# Patient Record
Sex: FEMALE | Race: WHITE | NOT HISPANIC OR LATINO | Employment: STUDENT | URBAN - METROPOLITAN AREA
[De-identification: names, ages, dates, MRNs, and addresses within clinical notes are randomized per-mention and may not be internally consistent; named-entity substitution may affect disease eponyms.]

---

## 2020-07-13 ENCOUNTER — OFFICE VISIT (OUTPATIENT)
Dept: INTERNAL MEDICINE | Facility: CLINIC | Age: 20
End: 2020-07-13

## 2020-07-13 ENCOUNTER — LAB (OUTPATIENT)
Dept: LAB | Facility: HOSPITAL | Age: 20
End: 2020-07-13

## 2020-07-13 VITALS
RESPIRATION RATE: 16 BRPM | HEIGHT: 68 IN | SYSTOLIC BLOOD PRESSURE: 140 MMHG | BODY MASS INDEX: 42.41 KG/M2 | HEART RATE: 86 BPM | WEIGHT: 279.8 LBS | TEMPERATURE: 97.7 F | OXYGEN SATURATION: 97 % | DIASTOLIC BLOOD PRESSURE: 80 MMHG

## 2020-07-13 DIAGNOSIS — F41.9 ANXIETY AND DEPRESSION: ICD-10-CM

## 2020-07-13 DIAGNOSIS — Z11.59 ENCOUNTER FOR HEPATITIS C SCREENING TEST FOR LOW RISK PATIENT: ICD-10-CM

## 2020-07-13 DIAGNOSIS — F32.A ANXIETY AND DEPRESSION: ICD-10-CM

## 2020-07-13 DIAGNOSIS — J45.30 MILD PERSISTENT ASTHMA WITHOUT COMPLICATION: ICD-10-CM

## 2020-07-13 DIAGNOSIS — Z01.419 WELL WOMAN EXAM WITH ROUTINE GYNECOLOGICAL EXAM: ICD-10-CM

## 2020-07-13 DIAGNOSIS — Z13.220 SCREENING, LIPID: ICD-10-CM

## 2020-07-13 DIAGNOSIS — Z13.1 SCREENING FOR DIABETES MELLITUS: ICD-10-CM

## 2020-07-13 DIAGNOSIS — M17.10 ARTHRITIS OF KNEE: ICD-10-CM

## 2020-07-13 DIAGNOSIS — Z00.00 HEALTHCARE MAINTENANCE: ICD-10-CM

## 2020-07-13 DIAGNOSIS — Z30.41 ENCOUNTER FOR SURVEILLANCE OF CONTRACEPTIVE PILLS: ICD-10-CM

## 2020-07-13 DIAGNOSIS — G43.709 CHRONIC MIGRAINE WITHOUT AURA WITHOUT STATUS MIGRAINOSUS, NOT INTRACTABLE: ICD-10-CM

## 2020-07-13 DIAGNOSIS — J30.81 ALLERGIC RHINITIS DUE TO ANIMAL HAIR AND DANDER: Primary | ICD-10-CM

## 2020-07-13 PROCEDURE — 83036 HEMOGLOBIN GLYCOSYLATED A1C: CPT | Performed by: INTERNAL MEDICINE

## 2020-07-13 PROCEDURE — 80061 LIPID PANEL: CPT | Performed by: INTERNAL MEDICINE

## 2020-07-13 PROCEDURE — 85027 COMPLETE CBC AUTOMATED: CPT | Performed by: INTERNAL MEDICINE

## 2020-07-13 PROCEDURE — 80053 COMPREHEN METABOLIC PANEL: CPT | Performed by: INTERNAL MEDICINE

## 2020-07-13 PROCEDURE — 86803 HEPATITIS C AB TEST: CPT | Performed by: INTERNAL MEDICINE

## 2020-07-13 PROCEDURE — 99204 OFFICE O/P NEW MOD 45 MIN: CPT | Performed by: INTERNAL MEDICINE

## 2020-07-13 RX ORDER — CETIRIZINE HYDROCHLORIDE 10 MG/1
10 TABLET ORAL DAILY
COMMUNITY
End: 2020-12-30

## 2020-07-13 RX ORDER — SUMATRIPTAN 50 MG/1
TABLET, FILM COATED ORAL
Qty: 12 TABLET | Refills: 2 | Status: SHIPPED | OUTPATIENT
Start: 2020-07-13 | End: 2022-07-21 | Stop reason: SDUPTHER

## 2020-07-13 RX ORDER — DESOGESTREL AND ETHINYL ESTRADIOL 21-5 (28)
1 KIT ORAL DAILY
COMMUNITY
End: 2020-12-30

## 2020-07-13 RX ORDER — VENLAFAXINE 100 MG/1
150 TABLET ORAL 2 TIMES DAILY
COMMUNITY
End: 2020-07-13

## 2020-07-13 RX ORDER — NAPROXEN 500 MG/1
500 TABLET ORAL 2 TIMES DAILY WITH MEALS
COMMUNITY
End: 2020-07-13 | Stop reason: SDUPTHER

## 2020-07-13 RX ORDER — BUPROPION HYDROCHLORIDE 450 MG/1
450 TABLET, FILM COATED, EXTENDED RELEASE ORAL DAILY
COMMUNITY
End: 2021-09-14 | Stop reason: DRUGHIGH

## 2020-07-13 RX ORDER — ALBUTEROL SULFATE 1.25 MG/3ML
1 SOLUTION RESPIRATORY (INHALATION) EVERY 6 HOURS PRN
COMMUNITY

## 2020-07-13 RX ORDER — SUMATRIPTAN 50 MG/1
50 TABLET, FILM COATED ORAL
COMMUNITY
End: 2020-07-13 | Stop reason: SDUPTHER

## 2020-07-13 RX ORDER — NAPROXEN 500 MG/1
500 TABLET ORAL 2 TIMES DAILY WITH MEALS
Qty: 30 TABLET | Refills: 5 | Status: SHIPPED | OUTPATIENT
Start: 2020-07-13 | End: 2022-07-21 | Stop reason: SDUPTHER

## 2020-07-13 RX ORDER — VENLAFAXINE HYDROCHLORIDE 150 MG/1
150 CAPSULE, EXTENDED RELEASE ORAL DAILY
COMMUNITY
End: 2021-07-19 | Stop reason: SDUPTHER

## 2020-07-13 RX ORDER — PROPRANOLOL HYDROCHLORIDE 10 MG/1
10 TABLET ORAL 2 TIMES DAILY PRN
COMMUNITY
End: 2021-09-14 | Stop reason: SDUPTHER

## 2020-07-13 NOTE — PROGRESS NOTES
Internal Medicine New Patient  Deysi Ordoñez is a 20 y.o. female who presents today to establish care and with concerns as outlined below.    Chief Complaint  Chief Complaint   Patient presents with   • Establish Care        HPI  Ms. Ordoñez comes today to establish care. She has moved here from [place] to go to [employer] for Graduate school in Chemistry.    She has anxiety and depression. She takes wellbutrin 450mg daily and effexor 150mg daily. She also has propranolol 10mg BID PRN for anxiety which she states is not taken very often but when taken is effective. This has been a stable regimen for the last 5 years. Mood is stable.    She has allergies, asthma, chronic sinusitis. Reports rhinoplasty x2 due to monthly sinus infections. She takes zyrtec and flonase PRN. Has been on immunotherapy in the past which she felt did not help. She uses advair daily and has needed albuterol nebulizer before bed each night lately due to moving into a new apartment. No SOA or cough. Needs allergy referral.    She has bilateral knee arthritis with floating patella. The pain is not bothersome, was previously when she played soccer as a teenager.    She has migraines and takes naproxen and imitrex 50mg PRN. She gets headache about once a month. She feels they are connected to OCP because they occur typically when resuming hormone containing pills at the beginning of a pack or if she misses a dose and resumes the following day. No aura preceding migraines. She is on her current birth control for acne. She has never been on any type of OCP or birth control.       Review of Systems  Review of Systems   Constitutional: Negative.    Eyes: Negative.    Respiratory: Negative.    Cardiovascular: Negative.    Gastrointestinal: Negative.    Genitourinary: Negative.    Musculoskeletal: Positive for arthralgias (knees).   Skin: Negative.    Allergic/Immunologic: Positive for environmental allergies.   Psychiatric/Behavioral: Positive for depressed  mood (infrequently). Negative for self-injury, suicidal ideas and stress. The patient is nervous/anxious (infrequently).         Past Medical History  Past Medical History:   Diagnosis Date   • Anxiety    • Depression         Surgical History  Past Surgical History:   Procedure Laterality Date   • NASAL SEPTUM SURGERY      1st 4/2009,2nd 5/2018        Family History  Family History   Problem Relation Age of Onset   • Thyroid disease Mother    • Hypertension Maternal Grandmother    • Hypertension Maternal Grandfather         Social History  Social History     Socioeconomic History   • Marital status: Single     Spouse name: Not on file   • Number of children: Not on file   • Years of education: Not on file   • Highest education level: Not on file   Tobacco Use   • Smoking status: Never Smoker   • Smokeless tobacco: Never Used   Substance and Sexual Activity   • Alcohol use: Yes     Comment: once a week   • Drug use: Never   • Sexual activity: Not Currently        Current Medications  Current Outpatient Medications on File Prior to Visit   Medication Sig Dispense Refill   • buPROPion XL (FORFIVO XL) 450 MG 24 hr tablet Take 450 mg by mouth Daily.     • desogestrel-ethinyl estradiol (Viorele) 0.15-0.02/0.01 MG (21/5) per tablet Take 1 tablet by mouth Daily.     • fluticasone-salmeterol (Advair Diskus) 250-50 MCG/DOSE DISKUS Inhale 2 (Two) Times a Day.     • naproxen (NAPROSYN) 500 MG tablet Take 500 mg by mouth 2 (Two) Times a Day With Meals.     • propranolol (INDERAL) 10 MG tablet Take 10 mg by mouth 2 (Two) Times a Day.     • SUMAtriptan (IMITREX) 50 MG tablet Take 50 mg by mouth Every 2 (Two) Hours As Needed for Migraine. Take one tablet at onset of headache. May repeat dose one time in 2 hours if headache not relieved.     • venlafaxine (EFFEXOR) 100 MG tablet Take 150 mg by mouth 2 (Two) Times a Day.       No current facility-administered medications on file prior to visit.        Allergies  No Known Allergies  "    Objective  Visit Vitals  /80   Pulse 86   Temp 97.7 °F (36.5 °C)   Resp 16   Ht 172.7 cm (68\")   Wt 127 kg (279 lb 12.8 oz)   SpO2 97%   BMI 42.54 kg/m²        Physical Exam  Physical Exam   Constitutional: She is oriented to person, place, and time. She appears well-developed and well-nourished. No distress. She is obese.  HENT:   Head: Normocephalic and atraumatic.   Right Ear: External ear normal.   Left Ear: External ear normal.   Nose: Nose normal.   Mouth/Throat: Oropharynx is clear and moist. No oropharyngeal exudate.   Eyes: Pupils are equal, round, and reactive to light. Conjunctivae and EOM are normal. No scleral icterus.   Neck: Neck supple.   Cardiovascular: Normal rate, regular rhythm and normal heart sounds.   No murmur heard.  Pulmonary/Chest: Effort normal and breath sounds normal. No respiratory distress.   Abdominal: Soft. Bowel sounds are normal. She exhibits no distension. There is no tenderness.   Musculoskeletal: She exhibits no edema or deformity.   Lymphadenopathy:     She has no cervical adenopathy.   Neurological: She is alert and oriented to person, place, and time.   Skin: Skin is warm and dry. No rash noted. She is not diaphoretic.   Psychiatric: She has a normal mood and affect. Her behavior is normal. Judgment and thought content normal.   Nursing note and vitals reviewed.       Results  No results found for this or any previous visit.     Assessment and Plan  Deysi was seen today for establish care.    Diagnoses and all orders for this visit:    Allergic rhinitis due to animal hair and dander  - On zyrtec and PRN flonase. Has had surgery to correct nasal septum deviation, remove polyps twice, adenoidectomy, turbinate reduction, sinus reconstruction.  - Has been on immunotherapy in the past with no improvement per her report.  - Referring to allergist today at her request.    Mild persistent asthma without complication  - On advair with good control of asthma typically " however has used albuterol nightly since moving into new apartment. No wheezing on exam. Respiratory effort normal, not hypoxic.  - Will continue current regimen and monitor for improvement.    Chronic migraine without aura without status migrainosus, not intractable  - Treats monthly headache with naproxen and imitrex 50mg PRN. May be related to OCP as she notes. Addressed below.    Anxiety and depression  - Well controlled on wellbutrin 450mg daily and effexor 150mg daily, propranolol 10mg BID PRN.    Arthritis of knee  - Reports bilateral knee arthritis and floating patella diagnosed by orthopedics. Pain currently manageable.    Encounter for surveillance of contraceptive pills  - Currently on OCP which is potentially triggering migraine headaches monthly. She will discuss alternative contraception with GYN, referral placed today.    Well woman exam with routine gynecological exam  - Pap smear 2019, possibly abnormal but not requiring additional testing per patient. ASCUS? Will need records which have been requested. Referring her to GYN.    Encounter for hepatitis C screening test for low risk patient  - HCV ab ordered.    Healthcare maintenance  - CBC and CMP ordered    Screening, lipid  - Lipid panel ordered.    Screening for diabetes mellitus  - A1c ordered    Health Maintenance   Topic Date Due   • ANNUAL PHYSICAL  04/21/2001   • HPV VACCINES (1 - Female 2-dose series) 04/21/2009   • TDAP/TD VACCINES (1 - Tdap) 04/21/2009   • HEPATITIS C SCREENING  07/13/2020   • CHLAMYDIA SCREENING  07/13/2020   • PAP SMEAR  07/13/2020   • INFLUENZA VACCINE  08/01/2020   • MENINGOCOCCAL VACCINE (Normal Risk)  Aged Out     Health Maintenance  - Pap smear: records requested from prior GYN.  - Mammogram: Start screening at age 40.  - Colonoscopy: Start screening at age 45-50.  - HCV: ordered  - Immunizations: records requested  - Depression screening: PHQ2=1 7/2020    Return in about 6 months (around 1/13/2021) for Follow up,  1 year for annual, Labs today.    Addendum:  Pap smear 12/16/19 ASCUS, HPV negative.

## 2020-07-14 LAB
ALBUMIN SERPL-MCNC: 4.5 G/DL (ref 3.5–5.2)
ALBUMIN/GLOB SERPL: 1.3 G/DL
ALP SERPL-CCNC: 56 U/L (ref 39–117)
ALT SERPL W P-5'-P-CCNC: 13 U/L (ref 1–33)
ANION GAP SERPL CALCULATED.3IONS-SCNC: 11.9 MMOL/L (ref 5–15)
AST SERPL-CCNC: 22 U/L (ref 1–32)
BILIRUB SERPL-MCNC: 0.4 MG/DL (ref 0–1.2)
BUN SERPL-MCNC: 13 MG/DL (ref 6–20)
BUN/CREAT SERPL: 14.8 (ref 7–25)
CALCIUM SPEC-SCNC: 10 MG/DL (ref 8.6–10.5)
CHLORIDE SERPL-SCNC: 105 MMOL/L (ref 98–107)
CHOLEST SERPL-MCNC: 200 MG/DL (ref 0–200)
CO2 SERPL-SCNC: 25.1 MMOL/L (ref 22–29)
CREAT SERPL-MCNC: 0.88 MG/DL (ref 0.57–1)
DEPRECATED RDW RBC AUTO: 37.3 FL (ref 37–54)
ERYTHROCYTE [DISTWIDTH] IN BLOOD BY AUTOMATED COUNT: 11.9 % (ref 12.3–15.4)
GFR SERPL CREATININE-BSD FRML MDRD: 80 ML/MIN/1.73
GLOBULIN UR ELPH-MCNC: 3.6 GM/DL
GLUCOSE SERPL-MCNC: 85 MG/DL (ref 65–99)
HBA1C MFR BLD: 5.15 % (ref 4.8–5.6)
HCT VFR BLD AUTO: 41.8 % (ref 34–46.6)
HCV AB SER DONR QL: NORMAL
HDLC SERPL-MCNC: 59 MG/DL (ref 40–60)
HGB BLD-MCNC: 14.5 G/DL (ref 12–15.9)
LDLC SERPL CALC-MCNC: 119 MG/DL (ref 0–100)
LDLC/HDLC SERPL: 2.02 {RATIO}
MCH RBC QN AUTO: 29.6 PG (ref 26.6–33)
MCHC RBC AUTO-ENTMCNC: 34.7 G/DL (ref 31.5–35.7)
MCV RBC AUTO: 85.3 FL (ref 79–97)
PLATELET # BLD AUTO: 376 10*3/MM3 (ref 140–450)
PMV BLD AUTO: 9.7 FL (ref 6–12)
POTASSIUM SERPL-SCNC: 4.7 MMOL/L (ref 3.5–5.2)
PROT SERPL-MCNC: 8.1 G/DL (ref 6–8.5)
RBC # BLD AUTO: 4.9 10*6/MM3 (ref 3.77–5.28)
SODIUM SERPL-SCNC: 142 MMOL/L (ref 136–145)
TRIGL SERPL-MCNC: 109 MG/DL (ref 0–150)
VLDLC SERPL-MCNC: 21.8 MG/DL (ref 5–40)
WBC # BLD AUTO: 6.07 10*3/MM3 (ref 3.4–10.8)

## 2020-12-30 ENCOUNTER — OFFICE VISIT (OUTPATIENT)
Dept: OBSTETRICS AND GYNECOLOGY | Facility: CLINIC | Age: 20
End: 2020-12-30

## 2020-12-30 VITALS
SYSTOLIC BLOOD PRESSURE: 122 MMHG | DIASTOLIC BLOOD PRESSURE: 78 MMHG | BODY MASS INDEX: 40.77 KG/M2 | WEIGHT: 269 LBS | HEIGHT: 68 IN

## 2020-12-30 DIAGNOSIS — Z01.419 ENCNTR FOR GYN EXAM (GENERAL) (ROUTINE) W/O ABN FINDINGS: Primary | ICD-10-CM

## 2020-12-30 PROCEDURE — 99385 PREV VISIT NEW AGE 18-39: CPT | Performed by: OBSTETRICS & GYNECOLOGY

## 2020-12-30 RX ORDER — MONTELUKAST SODIUM 10 MG/1
TABLET ORAL
COMMUNITY
Start: 2020-08-14

## 2021-01-07 ENCOUNTER — OFFICE VISIT (OUTPATIENT)
Dept: INTERNAL MEDICINE | Facility: CLINIC | Age: 21
End: 2021-01-07

## 2021-01-07 ENCOUNTER — LAB (OUTPATIENT)
Dept: LAB | Facility: HOSPITAL | Age: 21
End: 2021-01-07

## 2021-01-07 VITALS
HEIGHT: 68 IN | SYSTOLIC BLOOD PRESSURE: 120 MMHG | RESPIRATION RATE: 16 BRPM | BODY MASS INDEX: 40.86 KG/M2 | OXYGEN SATURATION: 99 % | DIASTOLIC BLOOD PRESSURE: 74 MMHG | TEMPERATURE: 97.7 F | WEIGHT: 269.6 LBS | HEART RATE: 87 BPM

## 2021-01-07 DIAGNOSIS — R10.11 RUQ ABDOMINAL PAIN: ICD-10-CM

## 2021-01-07 DIAGNOSIS — R10.11 RUQ ABDOMINAL PAIN: Primary | ICD-10-CM

## 2021-01-07 LAB
ALBUMIN SERPL-MCNC: 4.3 G/DL (ref 3.5–5.2)
ALBUMIN/GLOB SERPL: 1.3 G/DL
ALP SERPL-CCNC: 67 U/L (ref 39–117)
ALT SERPL W P-5'-P-CCNC: 10 U/L (ref 1–33)
ANION GAP SERPL CALCULATED.3IONS-SCNC: 7.7 MMOL/L (ref 5–15)
AST SERPL-CCNC: 21 U/L (ref 1–32)
BILIRUB SERPL-MCNC: 0.3 MG/DL (ref 0–1.2)
BUN SERPL-MCNC: 11 MG/DL (ref 6–20)
BUN/CREAT SERPL: 12.6 (ref 7–25)
CALCIUM SPEC-SCNC: 10.1 MG/DL (ref 8.6–10.5)
CHLORIDE SERPL-SCNC: 105 MMOL/L (ref 98–107)
CO2 SERPL-SCNC: 28.3 MMOL/L (ref 22–29)
CREAT SERPL-MCNC: 0.87 MG/DL (ref 0.57–1)
DEPRECATED RDW RBC AUTO: 38.4 FL (ref 37–54)
ERYTHROCYTE [DISTWIDTH] IN BLOOD BY AUTOMATED COUNT: 11.8 % (ref 12.3–15.4)
GFR SERPL CREATININE-BSD FRML MDRD: 81 ML/MIN/1.73
GLOBULIN UR ELPH-MCNC: 3.2 GM/DL
GLUCOSE SERPL-MCNC: 91 MG/DL (ref 65–99)
HCT VFR BLD AUTO: 41.4 % (ref 34–46.6)
HGB BLD-MCNC: 13.8 G/DL (ref 12–15.9)
MCH RBC QN AUTO: 29.6 PG (ref 26.6–33)
MCHC RBC AUTO-ENTMCNC: 33.3 G/DL (ref 31.5–35.7)
MCV RBC AUTO: 88.7 FL (ref 79–97)
PLATELET # BLD AUTO: 309 10*3/MM3 (ref 140–450)
PMV BLD AUTO: 9.9 FL (ref 6–12)
POTASSIUM SERPL-SCNC: 4 MMOL/L (ref 3.5–5.2)
PROT SERPL-MCNC: 7.5 G/DL (ref 6–8.5)
RBC # BLD AUTO: 4.67 10*6/MM3 (ref 3.77–5.28)
SODIUM SERPL-SCNC: 141 MMOL/L (ref 136–145)
WBC # BLD AUTO: 5.1 10*3/MM3 (ref 3.4–10.8)

## 2021-01-07 PROCEDURE — 85027 COMPLETE CBC AUTOMATED: CPT

## 2021-01-07 PROCEDURE — 99213 OFFICE O/P EST LOW 20 MIN: CPT | Performed by: INTERNAL MEDICINE

## 2021-01-07 PROCEDURE — 80053 COMPREHEN METABOLIC PANEL: CPT

## 2021-01-07 NOTE — PROGRESS NOTES
Internal Medicine Acute Visit    Chief Complaint   Patient presents with   • Abdominal Pain     x2 weeks        HPI  Ms. Ordoñez comes in today for 2 weeks of intermittent sharp, shooting epigastric to RUQ abdominal pain that radiates to her back. It occurs mainly after eating fatty, greasy foods and lasts for 30 minutes to a few hours. She denies GERD or reflux symptoms. She has not had N/V. She has had diarrhea daily which she describes as two softer stools twice daily. It is yellow in color, nonbloody. She has not had any abdominal surgeries and still has her gallbladder. No fever. She has naproxen on her med list which she uses only about 1-2 times a week at most for migraine.    Abdominal Pain  This is a new problem. The current episode started 1 to 4 weeks ago. The onset quality is sudden. The problem occurs daily. The most recent episode lasted 4 hours. The problem has been unchanged. The pain is located in the epigastric region. The pain is at a severity of 6/10. The quality of the pain is sharp. The abdominal pain radiates to the back. Associated symptoms include belching, constipation and diarrhea. Pertinent negatives include no anorexia, arthralgias, dysuria, fever, flatus, frequency, headaches, hematochezia, hematuria, melena, myalgias, nausea, vomiting or weight loss. The pain is aggravated by certain positions and eating. The pain is relieved by nothing and belching.        Review of Systems  Review of Systems   Constitutional: Negative for chills, fever and unexpected weight loss.   Gastrointestinal: Positive for abdominal pain, constipation and diarrhea. Negative for anorexia, blood in stool, flatus, hematochezia, melena, nausea, vomiting and GERD.   Genitourinary: Negative for dysuria, frequency and hematuria.   Musculoskeletal: Negative for arthralgias and myalgias.   Skin: Negative for color change.        Medications  Current Outpatient Medications on File Prior to Visit   Medication Sig  "Dispense Refill   • albuterol (ACCUNEB) 1.25 MG/3ML nebulizer solution Take 1 ampule by nebulization Every 6 (Six) Hours As Needed.     • buPROPion XL (FORFIVO XL) 450 MG 24 hr tablet Take 450 mg by mouth Daily.     • fluticasone (VERAMYST) 27.5 MCG/SPRAY nasal spray 2 sprays into the nostril(s) as directed by provider Daily.     • Fluticasone Furoate-Vilanterol (Breo Ellipta) 100-25 MCG/INH inhaler      • montelukast (SINGULAIR) 10 MG tablet      • naproxen (NAPROSYN) 500 MG tablet Take 1 tablet by mouth 2 (Two) Times a Day With Meals. 30 tablet 5   • Norethin-Eth Estrad-Fe Biphas (LO LOESTRIN FE) 1 MG-10 MCG / 10 MCG tablet Take 1 tablet by mouth Daily. 84 tablet 4   • propranolol (INDERAL) 10 MG tablet Take 10 mg by mouth 2 (Two) Times a Day As Needed.     • SUMAtriptan (IMITREX) 50 MG tablet Take one tablet at onset of headache. May repeat dose one time in 2 hours if headache not relieved. 12 tablet 2   • venlafaxine XR (EFFEXOR-XR) 150 MG 24 hr capsule Take 150 mg by mouth Daily.       No current facility-administered medications on file prior to visit.         Allergies  No Known Allergies    PMH  Past Medical History:   Diagnosis Date   • Anxiety    • Asthma    • Depression    • Migraines        Objective  Visit Vitals  /74   Pulse 87   Temp 97.7 °F (36.5 °C)   Resp 16   Ht 172.7 cm (67.99\")   Wt 122 kg (269 lb 9.6 oz)   LMP 12/20/2020 (Exact Date)   SpO2 99%   BMI 41.00 kg/m²        Physical Exam  Physical Exam  Vitals signs and nursing note reviewed.   Constitutional:       General: She is not in acute distress.     Appearance: She is well-developed. She is obese. She is not ill-appearing.   HENT:      Head: Normocephalic and atraumatic.   Eyes:      Conjunctiva/sclera: Conjunctivae normal.   Cardiovascular:      Rate and Rhythm: Normal rate and regular rhythm.      Heart sounds: Normal heart sounds.   Pulmonary:      Effort: Pulmonary effort is normal. No respiratory distress.      Breath sounds: " Normal breath sounds.   Abdominal:      General: Bowel sounds are normal. There is no distension.      Palpations: Abdomen is soft. There is no mass.      Tenderness: There is abdominal tenderness (RUQ). There is no guarding or rebound. Negative signs include Manzo's sign.   Skin:     General: Skin is warm and dry.   Neurological:      General: No focal deficit present.      Mental Status: She is alert and oriented to person, place, and time.      Gait: Gait normal.   Psychiatric:         Mood and Affect: Mood normal.         Behavior: Behavior normal.         Results  Results for orders placed or performed in visit on 07/13/20   CBC (No Diff)    Specimen: Blood   Result Value Ref Range    WBC 6.07 3.40 - 10.80 10*3/mm3    RBC 4.90 3.77 - 5.28 10*6/mm3    Hemoglobin 14.5 12.0 - 15.9 g/dL    Hematocrit 41.8 34.0 - 46.6 %    MCV 85.3 79.0 - 97.0 fL    MCH 29.6 26.6 - 33.0 pg    MCHC 34.7 31.5 - 35.7 g/dL    RDW 11.9 (L) 12.3 - 15.4 %    RDW-SD 37.3 37.0 - 54.0 fl    MPV 9.7 6.0 - 12.0 fL    Platelets 376 140 - 450 10*3/mm3   Comprehensive Metabolic Panel    Specimen: Blood   Result Value Ref Range    Glucose 85 65 - 99 mg/dL    BUN 13 6 - 20 mg/dL    Creatinine 0.88 0.57 - 1.00 mg/dL    Sodium 142 136 - 145 mmol/L    Potassium 4.7 3.5 - 5.2 mmol/L    Chloride 105 98 - 107 mmol/L    CO2 25.1 22.0 - 29.0 mmol/L    Calcium 10.0 8.6 - 10.5 mg/dL    Total Protein 8.1 6.0 - 8.5 g/dL    Albumin 4.50 3.50 - 5.20 g/dL    ALT (SGPT) 13 1 - 33 U/L    AST (SGOT) 22 1 - 32 U/L    Alkaline Phosphatase 56 39 - 117 U/L    Total Bilirubin 0.4 0.0 - 1.2 mg/dL    eGFR Non African Amer 80 >60 mL/min/1.73    Globulin 3.6 gm/dL    A/G Ratio 1.3 g/dL    BUN/Creatinine Ratio 14.8 7.0 - 25.0    Anion Gap 11.9 5.0 - 15.0 mmol/L   Lipid Panel    Specimen: Blood   Result Value Ref Range    Total Cholesterol 200 0 - 200 mg/dL    Triglycerides 109 0 - 150 mg/dL    HDL Cholesterol 59 40 - 60 mg/dL    LDL Cholesterol  119 (H) 0 - 100 mg/dL    VLDL  Cholesterol 21.8 5 - 40 mg/dL    LDL/HDL Ratio 2.02    Hemoglobin A1c    Specimen: Blood   Result Value Ref Range    Hemoglobin A1C 5.15 4.80 - 5.60 %   Hepatitis C Antibody    Specimen: Blood   Result Value Ref Range    Hepatitis C Ab Non-Reactive Non-Reactive        Assessment and Plan  Diagnoses and all orders for this visit:    RUQ abdominal pain  - Very suspicious for gallbladder pathology however gastritis also possible as fatty foods can trigger both. Pain is predominantly R sided and she denies traditional GERD symptoms, however, making the former more likely. No signs of cholangitis.  - Will check CMP, CBC and RUQ ultrasound  - Will have her start pepcid and avoid food triggers, NSAIDs    Health Maintenance  - Pap smear: records requested from prior GYN.  - Mammogram: Start screening at age 40.  - Colonoscopy: Start screening at age 45.  - HCV: negative  - Immunizations: Flu UTD.  - Depression screening: PHQ2=1 7/2020      Return in about 4 weeks (around 2/4/2021) for Follow up abdominal pain, chronic issues, Labs today.  Answers for HPI/ROS submitted by the patient on 12/31/2020   Abdominal pain  What is the primary reason for your visit?: Abdominal Pain

## 2021-01-13 ENCOUNTER — HOSPITAL ENCOUNTER (OUTPATIENT)
Dept: ULTRASOUND IMAGING | Facility: HOSPITAL | Age: 21
Discharge: HOME | End: 2021-01-13
Admitting: INTERNAL MEDICINE

## 2021-01-13 DIAGNOSIS — R10.11 RUQ ABDOMINAL PAIN: ICD-10-CM

## 2021-01-13 PROCEDURE — 76705 ECHO EXAM OF ABDOMEN: CPT

## 2021-01-16 ENCOUNTER — PATIENT MESSAGE (OUTPATIENT)
Dept: INTERNAL MEDICINE | Facility: CLINIC | Age: 21
End: 2021-01-16

## 2021-01-16 DIAGNOSIS — K52.9 CHRONIC DIARRHEA: Primary | ICD-10-CM

## 2021-01-18 ENCOUNTER — LAB (OUTPATIENT)
Dept: LAB | Facility: HOSPITAL | Age: 21
End: 2021-01-18

## 2021-01-18 DIAGNOSIS — K52.9 CHRONIC DIARRHEA: ICD-10-CM

## 2021-01-18 LAB
CRP SERPL-MCNC: 0.21 MG/DL (ref 0–0.5)
TSH SERPL DL<=0.05 MIU/L-ACNC: 1.89 UIU/ML (ref 0.27–4.2)

## 2021-01-18 PROCEDURE — 87329 GIARDIA AG IA: CPT

## 2021-01-18 PROCEDURE — 86140 C-REACTIVE PROTEIN: CPT

## 2021-01-18 PROCEDURE — 83516 IMMUNOASSAY NONANTIBODY: CPT

## 2021-01-18 PROCEDURE — 83993 ASSAY FOR CALPROTECTIN FECAL: CPT

## 2021-01-18 PROCEDURE — 82784 ASSAY IGA/IGD/IGG/IGM EACH: CPT

## 2021-01-18 PROCEDURE — 84443 ASSAY THYROID STIM HORMONE: CPT

## 2021-01-18 PROCEDURE — 86255 FLUORESCENT ANTIBODY SCREEN: CPT

## 2021-01-19 LAB
ENDOMYSIUM IGA SER QL: NEGATIVE
GLIADIN PEPTIDE IGA SER-ACNC: 4 UNITS (ref 0–19)
GLIADIN PEPTIDE IGG SER-ACNC: 1 UNITS (ref 0–19)
IGA SERPL-MCNC: 135 MG/DL (ref 87–352)
TTG IGA SER-ACNC: <2 U/ML (ref 0–3)
TTG IGG SER-ACNC: <2 U/ML (ref 0–5)

## 2021-01-20 LAB — G LAMBLIA AG STL QL IA: NEGATIVE

## 2021-01-22 LAB — CALPROTECTIN STL-MCNT: 26 UG/G (ref 0–120)

## 2021-02-11 ENCOUNTER — OFFICE VISIT (OUTPATIENT)
Dept: INTERNAL MEDICINE | Facility: CLINIC | Age: 21
End: 2021-02-11

## 2021-02-11 DIAGNOSIS — F32.A ANXIETY AND DEPRESSION: ICD-10-CM

## 2021-02-11 DIAGNOSIS — J45.30 MILD PERSISTENT ASTHMA WITHOUT COMPLICATION: ICD-10-CM

## 2021-02-11 DIAGNOSIS — R19.7 DIARRHEA, UNSPECIFIED TYPE: Primary | ICD-10-CM

## 2021-02-11 DIAGNOSIS — F41.9 ANXIETY AND DEPRESSION: ICD-10-CM

## 2021-02-11 DIAGNOSIS — G43.709 CHRONIC MIGRAINE WITHOUT AURA WITHOUT STATUS MIGRAINOSUS, NOT INTRACTABLE: ICD-10-CM

## 2021-02-11 DIAGNOSIS — J30.81 ALLERGIC RHINITIS DUE TO ANIMAL HAIR AND DANDER: ICD-10-CM

## 2021-02-11 PROCEDURE — 99441 PR PHYS/QHP TELEPHONE EVALUATION 5-10 MIN: CPT | Performed by: INTERNAL MEDICINE

## 2021-02-11 NOTE — PROGRESS NOTES
Internal Medicine Follow Up    Chief Complaint  Deysi Ordoñez is a 20 y.o. female who presents today for follow up of chronic medical conditions outlined below.    Chief Complaint   Patient presents with   • Follow-up     abdominal pain, diarrhea   • Anxiety   • Depression   • Headache   • Asthma      You have chosen to receive care through a telephone visit. Do you consent to use a telephone visit for your medical care today? Yes    HPI  Ms. Ordoñez was evaluated today by telephone for follow up of her abdominal pain and diarrhea as well as chronic anxiety, depression, migraine, asthma. She notes that abdominal pain has resolved, diarrhea is improving but still occurs on occasion usually associated with dairy or fatty foods. She denies blood in stool. She has not tried lactaid. She is not bothered by the diarrhea at this time. She notes recent change in OCP with decrease in migraine frequency to a few days a month during menstruation however she has had an increase in acne. She is giving this OCP another few months before calling her GYN to discuss changing. She continues to see [facility] for management of anxiety and depression and is stable on effexor 150mg daily, wellbutrin 450mg daily, and propranolol 10mg PRN. She reports good asthma and allergy control.       Review of Systems  Review of Systems   Constitutional: Negative.    Respiratory: Negative.    Cardiovascular: Negative.    Gastrointestinal: Positive for diarrhea. Negative for abdominal pain, anal bleeding, blood in stool, nausea, rectal pain, vomiting, GERD and indigestion.   Skin: Positive for rash (acne).   Allergic/Immunologic: Positive for environmental allergies (controlled).   Psychiatric/Behavioral: Positive for stress (school). Negative for depressed mood. The patient is not nervous/anxious.         Current Medications  Current Outpatient Medications on File Prior to Visit   Medication Sig Dispense Refill   • albuterol  (ACCUNEB) 1.25 MG/3ML nebulizer solution Take 1 ampule by nebulization Every 6 (Six) Hours As Needed.     • buPROPion XL (FORFIVO XL) 450 MG 24 hr tablet Take 450 mg by mouth Daily.     • fluticasone (VERAMYST) 27.5 MCG/SPRAY nasal spray 2 sprays into the nostril(s) as directed by provider Daily.     • Fluticasone Furoate-Vilanterol (Breo Ellipta) 100-25 MCG/INH inhaler      • montelukast (SINGULAIR) 10 MG tablet      • naproxen (NAPROSYN) 500 MG tablet Take 1 tablet by mouth 2 (Two) Times a Day With Meals. 30 tablet 5   • Norethin-Eth Estrad-Fe Biphas (LO LOESTRIN FE) 1 MG-10 MCG / 10 MCG tablet Take 1 tablet by mouth Daily. 84 tablet 4   • propranolol (INDERAL) 10 MG tablet Take 10 mg by mouth 2 (Two) Times a Day As Needed.     • SUMAtriptan (IMITREX) 50 MG tablet Take one tablet at onset of headache. May repeat dose one time in 2 hours if headache not relieved. 12 tablet 2   • venlafaxine XR (EFFEXOR-XR) 150 MG 24 hr capsule Take 150 mg by mouth Daily.       No current facility-administered medications on file prior to visit.        Allergies  No Known Allergies    Objective  There were no vitals taken for this visit.     Physical Exam  Physical Exam  Pulmonary:      Effort: Pulmonary effort is normal. No respiratory distress.   Neurological:      Mental Status: She is alert and oriented to person, place, and time.      Comments: Speech clear and fluent   Psychiatric:         Mood and Affect: Mood normal.         Results  Results for orders placed or performed in visit on 01/18/21   Giardia Antigen - Stool, Per Rectum    Specimen: Per Rectum; Stool   Result Value Ref Range    Giardia Ag, Stl Negative Negative   Calprotectin, Fecal - Stool, Per Rectum    Specimen: Per Rectum; Stool   Result Value Ref Range    Calprotectin, Fecal 26 0 - 120 ug/g        Assessment and Plan  Diagnoses and all orders for this visit:    Diarrhea, unspecified type  - RUQ abdominal pain resolved and diarrhea improved to only occasional  nonbloody loose stools that she notes are associated with eating dairy and fatty foods.  - Prior workup negative  - She will try lactaid, dietary modification  - If diarrhea does not improve with the above or worsens will refer to GI    Anxiety and depression  - Follows with behavioral health through [facility]. Mood well controlled on wellbutrin 450mg daily and effexor 150mg daily, propranolol 10mg BID PRN.    Mild persistent asthma without complication  - Well controlled on Breo, albuterol PRN.    Allergic rhinitis due to animal hair and dander  - Following with allergist and notes good control with flonase, singulair.    Chronic migraine without aura without status migrainosus, not intractable  - Treats monthly headache with naproxen and imitrex 50mg PRN.   - Has improved with change in OCP however acne unfortunately has worsened and this is main reason for OCP. She will discuss alternate OCP with GYN in a few months if acne persists.     Health Maintenance  - Pap smear: 12/2019 ASCUS, HPV negative. GYN Dr. Mosher  - Mammogram: Start screening at age 40.  - Colonoscopy: Start screening at age 45.  - HCV: negative  - Immunizations: Flu UTD.  - Depression screening: PHQ2=1 7/2020    Return in about 5 months (around 7/19/2021) for as scheduled.     This visit has been rescheduled as a phone visit to comply with patient safety concerns in accordance with CDC recommendations. Total time of discussion was 8 minutes.

## 2021-03-25 ENCOUNTER — TELEPHONE (OUTPATIENT)
Dept: OBSTETRICS AND GYNECOLOGY | Facility: CLINIC | Age: 21
End: 2021-03-25

## 2021-03-25 NOTE — TELEPHONE ENCOUNTER
Spoke with Deysi, she is requesting to change OCP's as she is having issues wit acne and migraine. Had previously spoken to Dr Mosher concerning this at last appt and she suggested Seasonique as a possible alternative. Pt advised Dr Mosher is out of office until 4/5/21 and we will contact her at that time. Voices understanding.

## 2021-03-25 NOTE — TELEPHONE ENCOUNTER
FISH      PT SAY BIRTH CONTROL NOT WORKING FOR HER. NOT HELPING HER MIGRAINES OR ACNE.      PHARM:  [facility] [address]

## 2021-04-08 RX ORDER — NORETHINDRONE ACETATE AND ETHINYL ESTRADIOL 1MG-20(21)
1 KIT ORAL DAILY
Qty: 28 TABLET | Refills: 12 | Status: SHIPPED | OUTPATIENT
Start: 2021-04-08 | End: 2021-10-01 | Stop reason: SDUPTHER

## 2021-07-19 ENCOUNTER — TELEPHONE (OUTPATIENT)
Dept: INTERNAL MEDICINE | Facility: CLINIC | Age: 21
End: 2021-07-19

## 2021-07-19 ENCOUNTER — OFFICE VISIT (OUTPATIENT)
Dept: INTERNAL MEDICINE | Facility: CLINIC | Age: 21
End: 2021-07-19

## 2021-07-19 VITALS
HEART RATE: 89 BPM | RESPIRATION RATE: 16 BRPM | DIASTOLIC BLOOD PRESSURE: 80 MMHG | SYSTOLIC BLOOD PRESSURE: 134 MMHG | WEIGHT: 281.4 LBS | OXYGEN SATURATION: 99 % | HEIGHT: 68 IN | TEMPERATURE: 97.5 F | BODY MASS INDEX: 42.65 KG/M2

## 2021-07-19 DIAGNOSIS — Z00.00 ANNUAL PHYSICAL EXAM: Primary | ICD-10-CM

## 2021-07-19 DIAGNOSIS — F32.A ANXIETY AND DEPRESSION: ICD-10-CM

## 2021-07-19 DIAGNOSIS — J30.81 ALLERGIC RHINITIS DUE TO ANIMAL HAIR AND DANDER: ICD-10-CM

## 2021-07-19 DIAGNOSIS — Z13.1 SCREENING FOR DIABETES MELLITUS: ICD-10-CM

## 2021-07-19 DIAGNOSIS — F41.9 ANXIETY AND DEPRESSION: ICD-10-CM

## 2021-07-19 DIAGNOSIS — G43.709 CHRONIC MIGRAINE WITHOUT AURA WITHOUT STATUS MIGRAINOSUS, NOT INTRACTABLE: ICD-10-CM

## 2021-07-19 DIAGNOSIS — J45.30 MILD PERSISTENT ASTHMA WITHOUT COMPLICATION: ICD-10-CM

## 2021-07-19 DIAGNOSIS — R19.7 DIARRHEA, UNSPECIFIED TYPE: ICD-10-CM

## 2021-07-19 DIAGNOSIS — E78.00 HYPERCHOLESTEROLEMIA: ICD-10-CM

## 2021-07-19 PROCEDURE — 99395 PREV VISIT EST AGE 18-39: CPT | Performed by: INTERNAL MEDICINE

## 2021-07-19 RX ORDER — VENLAFAXINE HYDROCHLORIDE 150 MG/1
150 CAPSULE, EXTENDED RELEASE ORAL DAILY
Qty: 90 CAPSULE | Refills: 0 | Status: SHIPPED | OUTPATIENT
Start: 2021-07-19 | End: 2021-07-19

## 2021-07-19 RX ORDER — VENLAFAXINE HYDROCHLORIDE 37.5 MG/1
CAPSULE, EXTENDED RELEASE ORAL
Qty: 90 CAPSULE | Refills: 1 | Status: SHIPPED | OUTPATIENT
Start: 2021-07-19 | End: 2021-10-12 | Stop reason: SDUPTHER

## 2021-07-19 NOTE — PROGRESS NOTES
Internal Medicine Annual Exam  Deysi Ordoñez is a 21 y.o. female who presents today for an annual exam and with concerns as outlined below.    Chief Complaint  Chief Complaint   Patient presents with   • Annual Exam     Referral to Psych        HPI  Ms. Ordoñez comes in today for physical. She notes that diarrhea is generally resolved with occasional episodes. She is not certain what lead to improvement. She has been to [facility] psychiatry once and has had difficulty getting her effexor and wellbutrin. She notes that the plan had been to either discontinue or lower wellbutrin but continue effexor due to increasing anxiety. She never was able to get her prescriptions filled and could not get an appointment to return to see her psychiatrist. She ran out of both effexor and wellbutrin in November. She has been doing okay up until recently when she has been more fatigued and has felt depressed several days. She would like referral to a new behavioral health provider. She otherwise notes control of migraines with as needed imitrex and allergies/asthma with singulair, Breo, and PRN albuterol. She is UTD with vision exam but needs a dental exam. Her COVID vaccine is complete. GYN exam is up to date.       Review of Systems  Review of Systems   Constitutional: Positive for fatigue.   Eyes: Negative.    Respiratory: Negative.    Cardiovascular: Negative.    Gastrointestinal: Negative for abdominal pain, anal bleeding, blood in stool, constipation and diarrhea.   Genitourinary: Negative.    Musculoskeletal: Negative.    Skin: Negative.    Neurological: Negative.    Psychiatric/Behavioral: Positive for decreased concentration and depressed mood. Negative for self-injury, sleep disturbance and suicidal ideas.        Past Medical History  Past Medical History:   Diagnosis Date   • Allergic    • Anxiety    • Asthma    • Depression    • Migraines         Surgical History  Past Surgical History:   Procedure Laterality  Date   • ADENOIDECTOMY  05/2018   • NASAL POLYP SURGERY  4/2009, 5/2018   • NASAL SEPTUM SURGERY  4/2009, 5/2018   • NASAL SINUS SURGERY  05/2018   • TURBINATE RESECTION  05/2018   • WISDOM TOOTH EXTRACTION  08/2011        Family History  Family History   Problem Relation Age of Onset   • Thyroid disease Mother    • Hypertension Maternal Grandmother    • Arthritis Maternal Grandmother    • Depression Maternal Grandmother    • Hypertension Maternal Grandfather    • Depression Maternal Grandfather    • Depression Paternal Grandmother    • Dementia Paternal Grandmother    • Depression Paternal Grandfather    • Breast cancer Paternal Aunt    • Ovarian cancer Neg Hx    • Uterine cancer Neg Hx    • Endometrial cancer Neg Hx    • Colon cancer Neg Hx         Social History  Social History     Socioeconomic History   • Marital status: Single     Spouse name: Not on file   • Number of children: Not on file   • Years of education: Not on file   • Highest education level: Not on file   Tobacco Use   • Smoking status: Never Smoker   • Smokeless tobacco: Never Used   Vaping Use   • Vaping Use: Never used   Substance and Sexual Activity   • Alcohol use: Yes     Alcohol/week: 2.0 standard drinks     Types: 2 Glasses of wine per week     Comment: once a week   • Drug use: Never   • Sexual activity: Never     Birth control/protection: OCP        Current Medications  Current Outpatient Medications on File Prior to Visit   Medication Sig Dispense Refill   • albuterol (ACCUNEB) 1.25 MG/3ML nebulizer solution Take 1 ampule by nebulization Every 6 (Six) Hours As Needed.     • fluticasone (VERAMYST) 27.5 MCG/SPRAY nasal spray 2 sprays into the nostril(s) as directed by provider Daily.     • Fluticasone Furoate-Vilanterol (Breo Ellipta) 100-25 MCG/INH inhaler      • montelukast (SINGULAIR) 10 MG tablet      • naproxen (NAPROSYN) 500 MG tablet Take 1 tablet by mouth 2 (Two) Times a Day With Meals. 30 tablet 5   • norethindrone-ethinyl  "estradiol FE (Junel FE 1/20) 1-20 MG-MCG per tablet Take 1 tablet by mouth Daily. Skip placebo week. 28 tablet 12   • propranolol (INDERAL) 10 MG tablet Take 10 mg by mouth 2 (Two) Times a Day As Needed.     • SUMAtriptan (IMITREX) 50 MG tablet Take one tablet at onset of headache. May repeat dose one time in 2 hours if headache not relieved. 12 tablet 2   • buPROPion XL (FORFIVO XL) 450 MG 24 hr tablet Take 450 mg by mouth Daily.     • [DISCONTINUED] venlafaxine XR (EFFEXOR-XR) 150 MG 24 hr capsule Take 150 mg by mouth Daily.       No current facility-administered medications on file prior to visit.       Allergies  No Known Allergies     Objective  Visit Vitals  /80   Pulse 89   Temp 97.5 °F (36.4 °C)   Resp 16   Ht 172.7 cm (67.99\")   Wt 128 kg (281 lb 6.4 oz)   SpO2 99%   BMI 42.80 kg/m²        Physical Exam  Physical Exam  Vitals and nursing note reviewed.   Constitutional:       General: She is not in acute distress.     Appearance: She is well-developed. She is obese. She is not ill-appearing or diaphoretic.   HENT:      Head: Normocephalic and atraumatic.      Right Ear: Ear canal and external ear normal.      Left Ear: Ear canal and external ear normal.      Ears:      Comments: Bilateral tympanosclerosis     Nose: Nose normal.      Mouth/Throat:      Pharynx: No oropharyngeal exudate.   Eyes:      General: No scleral icterus.     Conjunctiva/sclera: Conjunctivae normal.      Pupils: Pupils are equal, round, and reactive to light.   Cardiovascular:      Rate and Rhythm: Normal rate and regular rhythm.      Heart sounds: Normal heart sounds. No murmur heard.     Pulmonary:      Effort: Pulmonary effort is normal. No respiratory distress.      Breath sounds: Normal breath sounds.   Abdominal:      General: Bowel sounds are normal. There is no distension.      Palpations: Abdomen is soft. There is no mass.      Tenderness: There is no abdominal tenderness.   Musculoskeletal:         General: No " deformity.      Cervical back: Neck supple.      Right lower leg: No edema.      Left lower leg: No edema.   Lymphadenopathy:      Cervical: No cervical adenopathy.   Skin:     General: Skin is warm and dry.      Findings: No rash.   Neurological:      Mental Status: She is alert and oriented to person, place, and time. Mental status is at baseline.      Gait: Gait normal.   Psychiatric:         Mood and Affect: Mood normal.         Behavior: Behavior normal.         Thought Content: Thought content normal.         Judgment: Judgment normal.          Results  Results for orders placed or performed in visit on 01/18/21   Giardia Antigen - Stool, Per Rectum    Specimen: Per Rectum; Stool   Result Value Ref Range    Giardia Ag, Stl Negative Negative   Calprotectin, Fecal - Stool, Per Rectum    Specimen: Per Rectum; Stool   Result Value Ref Range    Calprotectin, Fecal 26 0 - 120 ug/g        Assessment and Plan  Diagnoses and all orders for this visit:    Annual physical exam  - Counseling was given to patient for the following topics:  appropriate exercise, healthy eating habits, disease prevention and importance of immunizations, including risks and benefits. Also discussed the importance of regular dental and vision care, as well recommendation for a yearly screening skin exam after age 40.     Anxiety and depression  - Has had issues getting appointments and prescriptions from provider at [facility]. Due to this she is no longer on wellbutrin 450mg daily and effexor 150mg daily. She does continue propranolol 10mg BID PRN.  - Recently more depressed and fatigued. Note currently experiencing anxiety but when on effexor and wellbutrin did note increased anxiety.  - Will start her back on effexor with plan to titrate from 37.5mg daily back to 150mg daily as tolerated. Hold off on resuming wellbutrin as it may have worsened her anxiety.  - Referral to [facility]    Allergic rhinitis due  to animal hair and dander  - Following with allergist and notes good control with flonase, singulair.    Mild persistent asthma without complication  - Well controlled on Breo, albuterol PRN.    Chronic migraine without aura without status migrainosus, not intractable  - Controlled with stable migraine frequency. Continue as needed naproxen and imitrex 50mg.     Diarrhea, unspecified type  - Resolved    Screening for diabetes mellitus  - A1c ordered    Hypercholesterolemia  -  in 2020. Repeat lipid panel ordered.       Health Maintenance   Topic Date Due   • ANNUAL PHYSICAL  Never done   • Pneumococcal Vaccine 0-64 (1 of 1 - PPSV23) Never done   • HPV VACCINES (1 - 2-dose series) Never done   • TDAP/TD VACCINES (1 - Tdap) Never done   • LIPID PANEL  07/19/2021   • INFLUENZA VACCINE  08/01/2021   • PAP SMEAR  12/16/2022   • HEPATITIS C SCREENING  Completed   • COVID-19 Vaccine  Completed     Health Maintenance  - Pap smear: 12/2019 ASCUS, HPV negative. GYN Dr. Mosher  - Mammogram: Start screening at age 40.  - Colonoscopy: Start screening at age 45.  - HCV: negative  - Immunizations: COVID complete.  - Depression screening: PHQ9 = 5 7/2021      Return in about 1 year (around 7/19/2022) for Annual.

## 2021-07-27 ENCOUNTER — TELEPHONE (OUTPATIENT)
Dept: INTERNAL MEDICINE | Facility: CLINIC | Age: 21
End: 2021-07-27

## 2021-07-27 NOTE — TELEPHONE ENCOUNTER
Caller: Deysi Ordoñez    Relationship: Self    Best call back number: [phone] (H)    Medication needed:   Requested Prescriptions      No prescriptions requested or ordered in this encounter     EFFEXOR       What is the patient's preferred pharmacy: [id number] - [place], [place] - [address] [phone] [facility] [phone] FX       PATIENT STATED THIS MEDICATION WAS SUPPOSE TO BE CHANGED TO A DIFFERENT MG AND WITH INSTRUCTIONS ON HOW TO TITRATE ON THIS MEDICATION     PLEASE ADVISE

## 2021-07-28 NOTE — TELEPHONE ENCOUNTER
Spoke w/ [facility], they didn't receive rx, called in venlafaxine that's in chart w/ dosing intructions. Pharmacist stated that med probably wouldn't cover since pt taking 4 capsules daily at week 3. Pharmacist stated that would put in for the first two weeks and may need updated rx sent in later.

## 2021-07-28 NOTE — TELEPHONE ENCOUNTER
This medication was sent on 7/19 to her [facility] on [address]. Please call and see if they have the script. Thank you.

## 2021-07-28 NOTE — TELEPHONE ENCOUNTER
Thank you. Please let patient know that prescription has been called in and she should call office when refill is needed. Will need to know what dose she is using at time of refill request.

## 2021-08-05 ENCOUNTER — LAB (OUTPATIENT)
Dept: LAB | Facility: HOSPITAL | Age: 21
End: 2021-08-05

## 2021-08-05 DIAGNOSIS — Z13.1 SCREENING FOR DIABETES MELLITUS: ICD-10-CM

## 2021-08-05 DIAGNOSIS — E78.00 HYPERCHOLESTEROLEMIA: ICD-10-CM

## 2021-08-05 LAB
CHOLEST SERPL-MCNC: 171 MG/DL (ref 0–200)
HBA1C MFR BLD: 4.8 % (ref 4.8–5.6)
HDLC SERPL-MCNC: 56 MG/DL (ref 40–60)
LDLC SERPL CALC-MCNC: 102 MG/DL (ref 0–100)
LDLC/HDLC SERPL: 1.81 {RATIO}
TRIGL SERPL-MCNC: 67 MG/DL (ref 0–150)
VLDLC SERPL-MCNC: 13 MG/DL (ref 5–40)

## 2021-08-05 PROCEDURE — 80061 LIPID PANEL: CPT

## 2021-08-05 PROCEDURE — 83036 HEMOGLOBIN GLYCOSYLATED A1C: CPT

## 2021-09-14 ENCOUNTER — TELEMEDICINE (OUTPATIENT)
Dept: PSYCHIATRY | Facility: CLINIC | Age: 21
End: 2021-09-14

## 2021-09-14 DIAGNOSIS — F33.0 MILD EPISODE OF RECURRENT MAJOR DEPRESSIVE DISORDER (HCC): Primary | ICD-10-CM

## 2021-09-14 DIAGNOSIS — F41.1 GENERALIZED ANXIETY DISORDER: ICD-10-CM

## 2021-09-14 DIAGNOSIS — F40.10 SOCIAL ANXIETY DISORDER: ICD-10-CM

## 2021-09-14 PROCEDURE — 90792 PSYCH DIAG EVAL W/MED SRVCS: CPT

## 2021-09-14 RX ORDER — PROPRANOLOL HYDROCHLORIDE 10 MG/1
10 TABLET ORAL 2 TIMES DAILY PRN
Qty: 60 TABLET | Refills: 0 | Status: SHIPPED | OUTPATIENT
Start: 2021-09-14 | End: 2021-12-07 | Stop reason: SDUPTHER

## 2021-09-14 RX ORDER — BUPROPION HYDROCHLORIDE 150 MG/1
150 TABLET ORAL EVERY MORNING
Qty: 30 TABLET | Refills: 0 | Status: SHIPPED | OUTPATIENT
Start: 2021-09-14 | End: 2021-10-12 | Stop reason: SDUPTHER

## 2021-09-14 NOTE — PROGRESS NOTES
This provider is located at [place], [place]. The Patient is seen remotely using Video. Patient is being seen via telehealth and confirm that they are in a secure environment for this session. The patient's condition being diagnosed/treated is appropriate for telemedicine. Provider identified as Eitan Felipe as well as credentials APRN MSN PMHNP-BC.   The client/patient gave consent to be seen remotely, and when consent is given they understand that the consent allows for patient identifiable information to be sent to a third party as needed.  They may refuse to be seen remotely at any time. The electronic data is encrypted and password protected, and the patient has been advised of the potential risks to privacy not withstanding such measures.    Subjective     Deysi Ordoñez is a 21 y.o. female who presents today for initial evaluation     Chief Complaint: Depression and anxiety    History of Present Illness: This is the first encounter for this APRN with the patient.  Patient is referred by her primary care physician for depression and anxiety.  Patient states that she has suffered from depression and anxiety since she was in high school.  States she previously been maintained on Effexor  mg daily and Wellbutrin  mg daily.  She states she went a year without taking any medications, but has now decided to restart on them.  States she started Effexor XR a couple of months ago and she and her primary care physician decided to just start 1 medication at a time.  She states that she feels her depression is doing well for her.  She rates her depression a 3-4 on a 1-10 scale with 10 being the worst.  She states when she is depressed her symptoms usually include decreased energy, decreased interest in things, lack of motivation.  She states that now even though her depression is under control that she is experiencing a lot of decreased motivation and fatigue.  She states she is sleeping too much.  Patient  is requesting to restart her Wellbutrin.  Denies any suicidal or homicidal ideation.  Denies any history of auditory or visual hallucinations.  Denies any paranoia.  Denies any OCD type symptoms.  Denies any history of any manic type symptoms.  Patient states she also suffers from anxiety.  Currently rates it as 6 on a 1-10 scale with 10 being the worst.  She attributes this to starting a new semester at grad school.  She states her previous psychiatrist had prescribed her propranolol 10 mg as needed (she has a supply and has still been using it), and she states that this controls her anxiety.  She states she took one before this visit because she was anxious.  Patient states her anxiety typically includes symptoms of increased heart rate, clammy hands, and racing thoughts.  Patient states she is an over thinker and a worrier.  She states she is able to go to the grocery store but still feels somewhat anxious.  Also, she states that in social situations she is very anxious.  She denies any history of panic attacks.  Currently she states her sleep has been too much and her appetite is normal.  Identifies current stressors as grad school.  Denies any history of seizures.    The following portions of the patient's history were reviewed and updated as appropriate: allergies, current medications, past family history, past medical history, past social history, past surgical history and problem list.    Past psychiatric history: First noticed she was depressed her freshman year of high school.  As a sophomore in high school she spent 5 days in an inpatient hospital because of suicidal ideation.  Denies any history of suicide attempts.  States she tried SSRIs in the past and they did not work for her.  Best combination of medications that patient can remember is Effexor and Wellbutrin.  Has done therapy in the past and is interested now.    Family psychiatric history: A paternal aunt has depression.  Maternal grand father  and grandmother have depression.  No suicides among first-degree relatives.    Substance use: Patient denies any substance use.  Drinks socially 1-2 times per month.  No tobacco use.    Past Medical History:  Past Medical History:   Diagnosis Date   • Allergic    • Anxiety    • Asthma    • Depression    • Diarrhea 2/11/2021   • Migraines        Social History: Patient was born and raised in [place], [place].  She was raised by her mother and father.  States she had a normal bowel calvillo.  Denies any history of abuse.  Has 1 brother and the relationship is good.  Patient moved to [place] to go to school at the [employer].  She has aunts and uncles in that area.  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  States she would like to work in [employer] in the future.  Patient currently lives alone.  She is single and prefers relationship for women.  Currently not in a relationship.  Hobbies include watching sports, reading, and working puzzles.  She works at the [employer] as a .  No legal issues noted.  Social History     Socioeconomic History   • Marital status: Single     Spouse name: Not on file   • Number of children: Not on file   • Years of education: Not on file   • Highest education level: Not on file   Tobacco Use   • Smoking status: Never Smoker   • Smokeless tobacco: Never Used   Vaping Use   • Vaping Use: Never used   Substance and Sexual Activity   • Alcohol use: Yes     Alcohol/week: 2.0 standard drinks     Types: 2 Glasses of wine per week     Comment: once a week   • Drug use: Never   • Sexual activity: Never     Birth control/protection: OCP       Family History:  Family History   Problem Relation Age of Onset   • Thyroid disease Mother    • Hypertension Maternal Grandmother    • Arthritis Maternal Grandmother    • Depression Maternal Grandmother    • Hypertension Maternal Grandfather    • Depression Maternal Grandfather    •  Depression Paternal Grandmother    • Dementia Paternal Grandmother    • Depression Paternal Grandfather    • Breast cancer Paternal Aunt    • Ovarian cancer Neg Hx    • Uterine cancer Neg Hx    • Endometrial cancer Neg Hx    • Colon cancer Neg Hx        Past Surgical History:  Past Surgical History:   Procedure Laterality Date   • ADENOIDECTOMY  05/2018   • NASAL POLYP SURGERY  4/2009, 5/2018   • NASAL SEPTUM SURGERY  4/2009, 5/2018   • NASAL SINUS SURGERY  05/2018   • TURBINATE RESECTION  05/2018   • WISDOM TOOTH EXTRACTION  08/2011       Problem List:  Patient Active Problem List   Diagnosis   • Mild persistent asthma without complication   • Chronic migraine without aura without status migrainosus, not intractable   • Allergic rhinitis due to animal hair and dander   • Anxiety and depression   • Arthritis of knee   • Hypercholesterolemia   • Mild episode of recurrent major depressive disorder (CMS/HCC)   • Generalized anxiety disorder   • Social anxiety disorder       Allergy:   No Known Allergies     Current Medications:   Current Outpatient Medications   Medication Sig Dispense Refill   • albuterol (ACCUNEB) 1.25 MG/3ML nebulizer solution Take 1 ampule by nebulization Every 6 (Six) Hours As Needed.     • fluticasone (VERAMYST) 27.5 MCG/SPRAY nasal spray 2 sprays into the nostril(s) as directed by provider Daily.     • Fluticasone Furoate-Vilanterol (Breo Ellipta) 100-25 MCG/INH inhaler      • montelukast (SINGULAIR) 10 MG tablet      • naproxen (NAPROSYN) 500 MG tablet Take 1 tablet by mouth 2 (Two) Times a Day With Meals. 30 tablet 5   • norethindrone-ethinyl estradiol FE (Junel FE 1/20) 1-20 MG-MCG per tablet Take 1 tablet by mouth Daily. Skip placebo week. 28 tablet 12   • propranolol (INDERAL) 10 MG tablet Take 1 tablet by mouth 2 (Two) Times a Day As Needed (Anxiety). 60 tablet 0   • SUMAtriptan (IMITREX) 50 MG tablet Take one tablet at onset of headache. May repeat dose one time in 2 hours if headache not  relieved. 12 tablet 2   • venlafaxine XR (EFFEXOR-XR) 37.5 MG 24 hr capsule Take 37.5mg daily for 1 week, then increase to 75mg daily for 1 week, and then 150mg daily thereafter. 90 capsule 1   • buPROPion XL (Wellbutrin XL) 150 MG 24 hr tablet Take 1 tablet by mouth Every Morning for 30 days. 30 tablet 0     No current facility-administered medications for this visit.       Review of Symptoms:    Review of Systems   Constitutional: Negative.    HENT: Negative.    Eyes: Negative.    Respiratory:        Has asthma   Cardiovascular: Negative.    Gastrointestinal: Negative.    Endocrine: Negative.    Genitourinary: Negative.    Musculoskeletal: Positive for arthralgias.   Skin: Negative.    Allergic/Immunologic: Positive for environmental allergies.   Neurological: Negative.    Hematological: Negative.    Psychiatric/Behavioral: Positive for sleep disturbance, depressed mood and stress. The patient is nervous/anxious.          Physical Exam:   not currently breastfeeding.    Appearance: Normal  Gait, Station, Strength: Within normal limits    Mental Status Exam:   Hygiene:   good  Cooperation:  Cooperative  Eye Contact:  Good  Psychomotor Behavior:  Appropriate  Affect:  Full range  Mood: depressed and anxious  Hopelessness: Denies  Speech:  Normal  Thought Process:  Goal directed  Thought Content:  Normal  Suicidal:  None  Homicidal:  None  Hallucinations:  None  Delusion:  None  Memory:  Intact  Orientation:  Person, Place, Time and Situation  Reliability:  good  Insight:  Good  Judgement:  Good  Impulse Control:  Good    PHQ-9 Depression Screening  Little interest or pleasure in doing things? (P) 1   Feeling down, depressed, or hopeless? (P) 0   Trouble falling or staying asleep, or sleeping too much? (P) 2   Feeling tired or having little energy? (P) 2   Poor appetite or overeating? (P) 0   Feeling bad about yourself - or that you are a failure or have let yourself or your family down? (P) 1   Trouble concentrating  on things, such as reading the newspaper or watching television? (P) 1   Moving or speaking so slowly that other people could have noticed? Or the opposite - being so fidgety or restless that you have been moving around a lot more than usual? (P) 0   Thoughts that you would be better off dead, or of hurting yourself in some way? (P) 0   PHQ-9 Total Score (P) 7   If you checked off any problems, how difficult have these problems made it for you to do your work, take care of things at home, or get along with other people? (P) Very difficult     PHQ-9 Total Score: (P) 7    GARRETT 7 anxiety screening tool that patient filled out virtually reviewed by this APRN at today's encounter.    PROMIS scale screening tool that patient filled out virtually reviewed by this APRN at today's encounter.    [facility] request number [id number] reviewed by this APRN at today's encounter.    Previous Provider notes and available records reviewed by this APRN today.     Lab Results:   Lab on 08/05/2021   Component Date Value Ref Range Status   • Total Cholesterol 08/05/2021 171  0 - 200 mg/dL Final   • Triglycerides 08/05/2021 67  0 - 150 mg/dL Final   • HDL Cholesterol 08/05/2021 56  40 - 60 mg/dL Final   • LDL Cholesterol  08/05/2021 102* 0 - 100 mg/dL Final   • VLDL Cholesterol 08/05/2021 13  5 - 40 mg/dL Final   • LDL/HDL Ratio 08/05/2021 1.81   Final   • Hemoglobin A1C 08/05/2021 4.80  4.80 - 5.60 % Final       Assessment/Plan   Problems Addressed this Visit        Mental Health    Mild episode of recurrent major depressive disorder (CMS/HCC) - Primary    Relevant Medications    buPROPion XL (Wellbutrin XL) 150 MG 24 hr tablet    Generalized anxiety disorder    Relevant Medications    buPROPion XL (Wellbutrin XL) 150 MG 24 hr tablet    propranolol (INDERAL) 10 MG tablet    Social anxiety disorder    Relevant Medications    buPROPion XL (Wellbutrin XL) 150 MG 24 hr tablet    propranolol (INDERAL) 10 MG tablet      Diagnoses       Codes  Comments    Mild episode of recurrent major depressive disorder (CMS/HCC)    -  Primary ICD-10-CM: F33.0  ICD-9-CM: 296.31     Generalized anxiety disorder     ICD-10-CM: F41.1  ICD-9-CM: 300.02     Social anxiety disorder     ICD-10-CM: F40.10  ICD-9-CM: 300.23           Visit Diagnoses:    ICD-10-CM ICD-9-CM   1. Mild episode of recurrent major depressive disorder (CMS/HCC)  F33.0 296.31   2. Generalized anxiety disorder  F41.1 300.02   3. Social anxiety disorder  F40.10 300.23     The patient was educated that her prescribed medications can have potential risk to a developing fetus. The patient is advised to contact this APRN/this office if she becomes pregnant or plans to become pregnant.  Pt verbalizes understanding and acknowledged agreement with this plan in her own words.    TREATMENT PLAN/GOALS: Continue supportive psychotherapy efforts and medications as indicated. Treatment and medication options discussed during today's visit. Patient acknowledged and verbally consented to continue with current treatment plan and was educated on the importance of compliance with treatment and follow-up appointments.    Short Term Goals: Patient will be compliant with medication, and patient will have no significant medication related side effects.  Patient will be engaged in psychotherapy as indicated.  Patient will report subjective improvement of symptoms.    Long term goals: To stabilize mood and treat/improve subjective symptoms, the patient will stay out of the hospital, the patient will be at an optimal level of functioning, and the patient will take all medications as prescribed.  The patient verbalized understanding and agreement with goals that were mutually set.    MEDICATION ISSUES:    Continue Effexor  mg daily for depression and anxiety.  Continue propranolol 10 mg twice daily as needed for anxiety.  Start Wellbutrin  mg daily for depression.  Patient was on Wellbutrin  mg last year.   Discussed with patient that we will start at a low dose and taper as needed.  Patient verbalized understanding and agreed.  Discussed medication options and treatment plan of prescribed medication as well as the risks, benefits, and side effects including potential falls, possible impaired driving and metabolic adversities among others.  Patient is also interested in restarting therapy.  Will schedule a therapy appointment for her.  Patient is agreeable to call the office with any worsening of symptoms or onset of side effects. Patient is agreeable to call 911 or go to the nearest ER should he/she begin having SI/HI.     MEDS ORDERED DURING VISIT:  New Medications Ordered This Visit   Medications   • buPROPion XL (Wellbutrin XL) 150 MG 24 hr tablet     Sig: Take 1 tablet by mouth Every Morning for 30 days.     Dispense:  30 tablet     Refill:  0   • propranolol (INDERAL) 10 MG tablet     Sig: Take 1 tablet by mouth 2 (Two) Times a Day As Needed (Anxiety).     Dispense:  60 tablet     Refill:  0       Return in about 4 weeks (around 10/12/2021) for Video visit.             This document has been electronically signed by JASS Shin  September 14, 2021 08:41 EDT    Part of this note may be an electronic transmission of spoken language to printed text using the Dragon Dictation System.

## 2021-10-01 ENCOUNTER — TELEPHONE (OUTPATIENT)
Dept: OBSTETRICS AND GYNECOLOGY | Facility: CLINIC | Age: 21
End: 2021-10-01

## 2021-10-01 RX ORDER — NORETHINDRONE ACETATE AND ETHINYL ESTRADIOL 1MG-20(21)
1 KIT ORAL DAILY
Qty: 28 TABLET | Refills: 3 | Status: SHIPPED | OUTPATIENT
Start: 2021-10-01 | End: 2022-05-10 | Stop reason: SDUPTHER

## 2021-10-01 NOTE — TELEPHONE ENCOUNTER
Spoke with patient and advised her that Dr. Persaud sent in a refill for her birth control to get her to her next appointment.  Patient verbalized understanding and had no questions at this time.

## 2021-10-01 NOTE — TELEPHONE ENCOUNTER
DR VICK.....FORMER DR FU      PT NEEDS REFILL ON BC JUNEL  ANNUAL APPT IN December. IT'S SOONER THAN LAST YEAR DUE TO NEW INS.       PHARM:  [facility] [address]

## 2021-10-12 ENCOUNTER — TELEMEDICINE (OUTPATIENT)
Dept: PSYCHIATRY | Facility: CLINIC | Age: 21
End: 2021-10-12

## 2021-10-12 DIAGNOSIS — F41.1 GENERALIZED ANXIETY DISORDER: ICD-10-CM

## 2021-10-12 DIAGNOSIS — F40.10 SOCIAL ANXIETY DISORDER: ICD-10-CM

## 2021-10-12 DIAGNOSIS — F33.0 MILD EPISODE OF RECURRENT MAJOR DEPRESSIVE DISORDER (HCC): Primary | ICD-10-CM

## 2021-10-12 DIAGNOSIS — F32.A ANXIETY AND DEPRESSION: ICD-10-CM

## 2021-10-12 DIAGNOSIS — F41.9 ANXIETY AND DEPRESSION: ICD-10-CM

## 2021-10-12 PROCEDURE — 99213 OFFICE O/P EST LOW 20 MIN: CPT

## 2021-10-12 RX ORDER — VENLAFAXINE HYDROCHLORIDE 150 MG/1
150 CAPSULE, EXTENDED RELEASE ORAL DAILY
Qty: 90 CAPSULE | Refills: 0 | Status: SHIPPED | OUTPATIENT
Start: 2021-10-12 | End: 2021-12-07 | Stop reason: SDUPTHER

## 2021-10-12 RX ORDER — BUPROPION HYDROCHLORIDE 150 MG/1
150 TABLET ORAL EVERY MORNING
Qty: 90 TABLET | Refills: 0 | Status: SHIPPED | OUTPATIENT
Start: 2021-10-12 | End: 2021-12-07 | Stop reason: SDUPTHER

## 2021-10-12 NOTE — PROGRESS NOTES
This provider is located at [place], [place]. The Patient is seen remotely using Video. Patient is being seen via telehealth and confirm that they are in a secure environment for this session. The patient's condition being diagnosed/treated is appropriate for telemedicine. Provider identified as Eitan Felipe as well as credentials JASS MAR PMHNP-BC.   The client/patient gave consent to be seen remotely, and when consent is given they understand that the consent allows for patient identifiable information to be sent to a third party as needed.  They may refuse to be seen remotely at any time. The electronic data is encrypted and password protected, and the patient has been advised of the potential risks to privacy not withstanding such measures.    Chief Complaint  Depression and Anxiety    Subjective        Deysi Ordoñez presents to [facility] for   History of Present Illness  Patient seen today for a follow-up visit for depression and anxiety.  Patient states that she is doing well on the current medications.  She states she feels that the current medications are controlling her depression and anxiety.  Patient states upon starting Wellbutrin she had trouble sleeping for a few nights, but that his gone away.  Reports her sleep is good.  Reports her appetite is good.  Rates her depression a 1 on a 1-10 scale with 10 being the worst.  Denies any feelings of depression.  Denies any suicidal or homicidal ideation.  Denies any auditory or visual hallucinations.  Denies any paranoia.  Patient rates her anxiety a 3-4 on a 1-10 scale with 10 being the worst.  States she has not had any panic attacks.  States she has not had to take the propanolol.  She states school is going well.  She is in graduate school and also a  at the [employer].  Denies any side effects to the medications.  Objective   Vital Signs:   There were no vitals taken for this visit.       PHQ-9 Score:   PHQ-9 Total Score: (P) 2     Mental Status Exam:   Hygiene:   good  Cooperation:  Cooperative  Eye Contact:  Good  Psychomotor Behavior:  Appropriate  Affect:  Full range  Mood: normal  Speech:  Normal  Thought Process:  Goal directed  Thought Content:  Normal  Suicidal:  None  Homicidal:  None  Hallucinations:  None  Delusion:  None  Memory:  Intact  Orientation:  Person, Place, Time and Situation  Reliability:  good  Insight:  Good  Judgement:  Good  Impulse Control:  Good  Physical/Medical Issues:  No      PHQ-9 Depression Screening  Little interest or pleasure in doing things? (P) 0   Feeling down, depressed, or hopeless? (P) 0   Trouble falling or staying asleep, or sleeping too much? (P) 1   Feeling tired or having little energy? (P) 1   Poor appetite or overeating? (P) 0   Feeling bad about yourself - or that you are a failure or have let yourself or your family down? (P) 0   Trouble concentrating on things, such as reading the newspaper or watching television? (P) 0   Moving or speaking so slowly that other people could have noticed? Or the opposite - being so fidgety or restless that you have been moving around a lot more than usual? (P) 0   Thoughts that you would be better off dead, or of hurting yourself in some way? (P) 0   PHQ-9 Total Score (P) 2   If you checked off any problems, how difficult have these problems made it for you to do your work, take care of things at home, or get along with other people? (P) Somewhat difficult     PHQ-9 Total Score: (P) 2    GARRETT 7 anxiety screening tool that patient filled out virtually reviewed by this APRN at today's encounter.    PROMIS scale screening tool that patient filled out virtually reviewed by this APRN at today's encounter.    Previous Provider notes and available records reviewed by this APRN today.   Current Medications:   Current Outpatient Medications   Medication Sig Dispense Refill   • albuterol (ACCUNEB) 1.25 MG/3ML nebulizer  solution Take 1 ampule by nebulization Every 6 (Six) Hours As Needed.     • buPROPion XL (Wellbutrin XL) 150 MG 24 hr tablet Take 1 tablet by mouth Every Morning. 90 tablet 0   • fluticasone (VERAMYST) 27.5 MCG/SPRAY nasal spray 2 sprays into the nostril(s) as directed by provider Daily.     • Fluticasone Furoate-Vilanterol (Breo Ellipta) 100-25 MCG/INH inhaler      • montelukast (SINGULAIR) 10 MG tablet      • naproxen (NAPROSYN) 500 MG tablet Take 1 tablet by mouth 2 (Two) Times a Day With Meals. 30 tablet 5   • norethindrone-ethinyl estradiol FE (Junel FE 1/20) 1-20 MG-MCG per tablet Take 1 tablet by mouth Daily for 112 days. Skip placebo week. 28 tablet 3   • propranolol (INDERAL) 10 MG tablet Take 1 tablet by mouth 2 (Two) Times a Day As Needed (Anxiety). 60 tablet 0   • SUMAtriptan (IMITREX) 50 MG tablet Take one tablet at onset of headache. May repeat dose one time in 2 hours if headache not relieved. 12 tablet 2   • venlafaxine XR (EFFEXOR-XR) 150 MG 24 hr capsule Take 1 capsule by mouth Daily. 90 capsule 0     No current facility-administered medications for this visit.       Physical Exam   Result Review :    The following data was reviewed by: JASS Shin on 10/12/2021:  Common labs    Common Labsle 1/7/21 1/7/21 8/5/21 8/5/21    0828 0828 0915 0915   Glucose  91     BUN  11     Creatinine  0.87     eGFR Non African Am  81     Sodium  141     Potassium  4.0     Chloride  105     Calcium  10.1     Albumin  4.30     Total Bilirubin  0.3     Alkaline Phosphatase  67     AST (SGOT)  21     ALT (SGPT)  10     WBC 5.10      Hemoglobin 13.8      Hematocrit 41.4      Platelets 309      Total Cholesterol    171   Triglycerides    67   HDL Cholesterol    56   LDL Cholesterol     102 (A)   Hemoglobin A1C   4.80    (A) Abnormal value                 Assessment and Plan   Problem List Items Addressed This Visit        Mental Health    Anxiety and depression    Relevant Medications    buPROPion XL (Wellbutrin  XL) 150 MG 24 hr tablet    venlafaxine XR (EFFEXOR-XR) 150 MG 24 hr capsule    Mild episode of recurrent major depressive disorder (HCC) - Primary    Relevant Medications    buPROPion XL (Wellbutrin XL) 150 MG 24 hr tablet    venlafaxine XR (EFFEXOR-XR) 150 MG 24 hr capsule    Generalized anxiety disorder    Relevant Medications    buPROPion XL (Wellbutrin XL) 150 MG 24 hr tablet    venlafaxine XR (EFFEXOR-XR) 150 MG 24 hr capsule    Social anxiety disorder    Relevant Medications    buPROPion XL (Wellbutrin XL) 150 MG 24 hr tablet    venlafaxine XR (EFFEXOR-XR) 150 MG 24 hr capsule        Patient reports that she is doing well.  We will continue the current combination of medications.  Julia see the patient again in 2 months.  Patient informed to call if she had any problems before then.  Patient agreed and verbalized understanding.    TREATMENT PLAN/GOALS: Continue supportive psychotherapy efforts and medications as indicated. Treatment and medication options discussed during today's visit. Patient ackowledged and verbally consented to continue with current treatment plan and was educated on the importance of compliance with treatment and follow-up appointments.    DEPRESSION:  Patient screened positive for depression based on a PHQ-9 score of 2 on 10/11/2021. Follow-up recommendations include: Prescribed antidepressant medication treatment.       MEDICATION ISSUES:  We discussed risks, benefits, and side effects of the above medications and the patient was agreeable with the plan. Patient was educated on the importance of compliance with treatment and follow-up appointments.  Patient is agreeable to call the office with any worsening of symptoms or onset of side effects. Patient is agreeable to call 911 or go to the nearest ER should he/she begin having SI/HI.      Counseled patient regarding multimodal approach with healthy nutrition, healthy sleep, regular physical activity, social activities, counseling, and  medications.      Coping skills reviewed and encouraged positive framing of thoughts     Assisted patient in processing above session content; acknowledged and normalized patient’s thoughts, feelings, and concerns.  Applied  positive coping skills and behavior management in session.  Allowed patient to freely discuss issues without interruption or judgment. Provided safe, confidential environment to facilitate the development of positive therapeutic relationship and encourage open, honest communication. Assisted patient in identifying risk factors which would indicate the need for higher level of care including thoughts to harm self or others and/or self-harming behavior and encouraged patient to contact this office, call 911, or present to the nearest emergency room should any of these events occur. Discussed crisis intervention services and means to access.     MEDS ORDERED DURING VISIT:  New Medications Ordered This Visit   Medications   • buPROPion XL (Wellbutrin XL) 150 MG 24 hr tablet     Sig: Take 1 tablet by mouth Every Morning.     Dispense:  90 tablet     Refill:  0   • venlafaxine XR (EFFEXOR-XR) 150 MG 24 hr capsule     Sig: Take 1 capsule by mouth Daily.     Dispense:  90 capsule     Refill:  0       I spent 25 minutes caring for Deysi on this date of service. This time includes time spent by me in the following activities:preparing for the visit, performing a medically appropriate examination and/or evaluation , ordering medications, tests, or procedures and documenting information in the medical record    Follow Up   Return in about 2 months (around 12/12/2021) for Video visit.    Patient was given instructions and counseling regarding her condition or for health maintenance advice. Please see specific information pulled into the AVS if appropriate.         This document has been electronically signed by JASS Shin  October 12, 2021 08:42 EDT    Part of this note may be an electronic  transcription/translation of spoken language to printed text using the Dragon Dictation System.

## 2021-12-07 ENCOUNTER — TELEMEDICINE (OUTPATIENT)
Dept: PSYCHIATRY | Facility: CLINIC | Age: 21
End: 2021-12-07

## 2021-12-07 DIAGNOSIS — F33.0 MILD EPISODE OF RECURRENT MAJOR DEPRESSIVE DISORDER (HCC): ICD-10-CM

## 2021-12-07 DIAGNOSIS — F32.A ANXIETY AND DEPRESSION: ICD-10-CM

## 2021-12-07 DIAGNOSIS — F40.10 SOCIAL ANXIETY DISORDER: ICD-10-CM

## 2021-12-07 DIAGNOSIS — F41.9 ANXIETY AND DEPRESSION: ICD-10-CM

## 2021-12-07 DIAGNOSIS — F41.1 GENERALIZED ANXIETY DISORDER: ICD-10-CM

## 2021-12-07 PROCEDURE — 99213 OFFICE O/P EST LOW 20 MIN: CPT

## 2021-12-07 RX ORDER — BUPROPION HYDROCHLORIDE 150 MG/1
150 TABLET ORAL EVERY MORNING
Qty: 90 TABLET | Refills: 0 | Status: SHIPPED | OUTPATIENT
Start: 2021-12-07 | End: 2022-01-31 | Stop reason: SDUPTHER

## 2021-12-07 RX ORDER — VENLAFAXINE HYDROCHLORIDE 150 MG/1
150 CAPSULE, EXTENDED RELEASE ORAL DAILY
Qty: 90 CAPSULE | Refills: 0 | Status: SHIPPED | OUTPATIENT
Start: 2021-12-07 | End: 2022-01-31 | Stop reason: SDUPTHER

## 2021-12-07 RX ORDER — HYDROXYZINE HYDROCHLORIDE 25 MG/1
25 TABLET, FILM COATED ORAL 3 TIMES DAILY PRN
Qty: 90 TABLET | Refills: 0 | Status: SHIPPED | OUTPATIENT
Start: 2021-12-07 | End: 2021-12-29

## 2021-12-07 RX ORDER — PROPRANOLOL HYDROCHLORIDE 10 MG/1
10 TABLET ORAL 2 TIMES DAILY PRN
Qty: 60 TABLET | Refills: 0 | Status: SHIPPED | OUTPATIENT
Start: 2021-12-07 | End: 2021-12-29

## 2021-12-07 NOTE — PROGRESS NOTES
"This provider is located at [place], [place]. The Patient is seen remotely using Video. Patient is being seen via telehealth and confirm that they are in a secure environment for this session. Patient is located in [place], [place]. The patient's condition being diagnosed/treated is appropriate for telemedicine. Provider identified as Eitan Felipe as well as credentials JASS MSN PMHNP-BC.   The client/patient gave consent to be seen remotely, and when consent is given they understand that the consent allows for patient identifiable information to be sent to a third party as needed.  They may refuse to be seen remotely at any time. The electronic data is encrypted and password protected, and the patient has been advised of the potential risks to privacy not withstanding such measures.    Chief Complaint  Depression and Anxiety    Subjective        Deysi Ordoñez presents to [facility] for   History of Present Illness  Patient seen today for follow-up visit for depression and anxiety.  Patient states she is had a lot of stress with school coming up due to it being finals.  She states that has caused her anxiety to be increased some.  She states this increase in stress is also caused interruption in her sleep cycle.  States the stress makes her tired, so she takes a nap when she gets home, and then has trouble sleeping at night.  States her appetite has been good.  She states depression has not really been an issue for her.  Currently rates her depression a 2 on a 1-10 scale with 10 being the worst.  Denies any suicidal or homicidal ideation.  Currently rates her anxiety a 6 on a 1-10 scale with 10 being the worst.  She states the propanolol has helped with her social anxiety, but now she is having just overall general constant anxiety.  She states \"it is always in the background\".  She states she thinks this is just a temporary thing with the increased stress from finals.  " States that will be over in the next couple of weeks.  States she has plans for Eulogio to go be with her family in [place] and is excited about that.  Denies any side effects to the medications.  Denies any auditory or visual hallucinations.  Denies any manic type behaviors.  Denies any paranoia.  Objective   Vital Signs:   There were no vitals taken for this visit.      PHQ-9 Score:   PHQ-9 Total Score: (P) 3     Mental Status Exam:   Hygiene:   good  Cooperation:  Cooperative  Eye Contact:  Good  Psychomotor Behavior:  Appropriate  Affect:  Full range  Mood: anxious  Speech:  Normal  Thought Process:  Goal directed  Thought Content:  Normal  Suicidal:  None  Homicidal:  None  Hallucinations:  None  Delusion:  None  Memory:  Intact  Orientation:  Person, Place, Time and Situation  Reliability:  good  Insight:  Good  Judgement:  Good  Impulse Control:  Good  Physical/Medical Issues:  No      PHQ-9 Depression Screening  Little interest or pleasure in doing things? (P) 0   Feeling down, depressed, or hopeless? (P) 0   Trouble falling or staying asleep, or sleeping too much? (P) 1   Feeling tired or having little energy? (P) 1   Poor appetite or overeating? (P) 0   Feeling bad about yourself - or that you are a failure or have let yourself or your family down? (P) 0   Trouble concentrating on things, such as reading the newspaper or watching television? (P) 1   Moving or speaking so slowly that other people could have noticed? Or the opposite - being so fidgety or restless that you have been moving around a lot more than usual? (P) 0   Thoughts that you would be better off dead, or of hurting yourself in some way? (P) 0   PHQ-9 Total Score (P) 3   If you checked off any problems, how difficult have these problems made it for you to do your work, take care of things at home, or get along with other people? (P) Somewhat difficult     PHQ-9 Total Score: (P) 3    GARRETT 7 anxiety screening tool that patient filled out  virtually reviewed by this APRN at today's encounter.    PROMIS scale screening tool that patient filled out virtually reviewed by this APRN at today's encounter.    Previous Provider notes and available records reviewed by this APRN today.   Current Medications:   Current Outpatient Medications   Medication Sig Dispense Refill   • albuterol (ACCUNEB) 1.25 MG/3ML nebulizer solution Take 1 ampule by nebulization Every 6 (Six) Hours As Needed.     • buPROPion XL (Wellbutrin XL) 150 MG 24 hr tablet Take 1 tablet by mouth Every Morning. 90 tablet 0   • fluticasone (VERAMYST) 27.5 MCG/SPRAY nasal spray 2 sprays into the nostril(s) as directed by provider Daily.     • Fluticasone Furoate-Vilanterol (Breo Ellipta) 100-25 MCG/INH inhaler      • hydrOXYzine (ATARAX) 25 MG tablet Take 1 tablet by mouth 3 (Three) Times a Day As Needed for Anxiety. 90 tablet 0   • montelukast (SINGULAIR) 10 MG tablet      • naproxen (NAPROSYN) 500 MG tablet Take 1 tablet by mouth 2 (Two) Times a Day With Meals. 30 tablet 5   • norethindrone-ethinyl estradiol FE (Junel FE 1/20) 1-20 MG-MCG per tablet Take 1 tablet by mouth Daily for 112 days. Skip placebo week. 28 tablet 3   • propranolol (INDERAL) 10 MG tablet Take 1 tablet by mouth 2 (Two) Times a Day As Needed (Anxiety). 60 tablet 0   • SUMAtriptan (IMITREX) 50 MG tablet Take one tablet at onset of headache. May repeat dose one time in 2 hours if headache not relieved. 12 tablet 2   • venlafaxine XR (EFFEXOR-XR) 150 MG 24 hr capsule Take 1 capsule by mouth Daily. 90 capsule 0     No current facility-administered medications for this visit.       Physical Exam   Result Review :    The following data was reviewed by: JASS Shin on 12/07/2021:  Common labs    Common Labsle 1/7/21 1/7/21 8/5/21 8/5/21    0828 0828 0915 0915   Glucose  91     BUN  11     Creatinine  0.87     eGFR Non African Am  81     Sodium  141     Potassium  4.0     Chloride  105     Calcium  10.1     Albumin  4.30      Total Bilirubin  0.3     Alkaline Phosphatase  67     AST (SGOT)  21     ALT (SGPT)  10     WBC 5.10      Hemoglobin 13.8      Hematocrit 41.4      Platelets 309      Total Cholesterol    171   Triglycerides    67   HDL Cholesterol    56   LDL Cholesterol     102 (A)   Hemoglobin A1C   4.80    (A) Abnormal value               Assessment and Plan   Problem List Items Addressed This Visit        Mental Health    Anxiety and depression    Relevant Medications    venlafaxine XR (EFFEXOR-XR) 150 MG 24 hr capsule    buPROPion XL (Wellbutrin XL) 150 MG 24 hr tablet    hydrOXYzine (ATARAX) 25 MG tablet    Mild episode of recurrent major depressive disorder (HCC)    Relevant Medications    venlafaxine XR (EFFEXOR-XR) 150 MG 24 hr capsule    buPROPion XL (Wellbutrin XL) 150 MG 24 hr tablet    hydrOXYzine (ATARAX) 25 MG tablet    Generalized anxiety disorder    Relevant Medications    venlafaxine XR (EFFEXOR-XR) 150 MG 24 hr capsule    buPROPion XL (Wellbutrin XL) 150 MG 24 hr tablet    propranolol (INDERAL) 10 MG tablet    hydrOXYzine (ATARAX) 25 MG tablet    Social anxiety disorder    Relevant Medications    venlafaxine XR (EFFEXOR-XR) 150 MG 24 hr capsule    buPROPion XL (Wellbutrin XL) 150 MG 24 hr tablet    propranolol (INDERAL) 10 MG tablet    hydrOXYzine (ATARAX) 25 MG tablet        Discussed treatment options with patient.  Discussed the possibility of increasing her Effexor, but if she thought this was just due to the increased stress from finals we could add hydroxyzine on an as-needed basis.  Patient states she thinks it is just temporary and would like to try the hydroxyzine instead of increasing the Effexor at this time.  Informed patient that we can reevaluate her status in 4 weeks and if the increase in anxiety is still there after finals are over then we can discuss increasing her Effexor.  Patient thinks this is a good plan.  Continue Effexor  mg daily for depression and anxiety.  Continue Wellbutrin   mg for depression.  Add hydroxyzine 25 mg 3 times daily as needed for anxiety.  Instructed patient that this medication can make her drowsy, so she should tested out at home before relying on it outside the home.  Patient should so tested out before driving any type of machinery.  Patient verbalized understanding and agreed.  Continue propanolol 10 mg twice daily as needed for anxiety.    TREATMENT PLAN/GOALS: Continue supportive psychotherapy efforts and medications as indicated. Treatment and medication options discussed during today's visit. Patient ackowledged and verbally consented to continue with current treatment plan and was educated on the importance of compliance with treatment and follow-up appointments.    DEPRESSION:  Patient screened positive for depression based on a PHQ-9 score of 3 on 12/6/2021. Follow-up recommendations include: Prescribed antidepressant medication treatment.       MEDICATION ISSUES:  We discussed risks, benefits, and side effects of the above medications and the patient was agreeable with the plan. Patient was educated on the importance of compliance with treatment and follow-up appointments.  Patient is agreeable to call the office with any worsening of symptoms or onset of side effects. Patient is agreeable to call 911 or go to the nearest ER should he/she begin having SI/HI.      Counseled patient regarding multimodal approach with healthy nutrition, healthy sleep, regular physical activity, social activities, counseling, and medications.      Coping skills reviewed and encouraged positive framing of thoughts     Assisted patient in processing above session content; acknowledged and normalized patient’s thoughts, feelings, and concerns.  Applied  positive coping skills and behavior management in session.  Allowed patient to freely discuss issues without interruption or judgment. Provided safe, confidential environment to facilitate the development of positive  therapeutic relationship and encourage open, honest communication. Assisted patient in identifying risk factors which would indicate the need for higher level of care including thoughts to harm self or others and/or self-harming behavior and encouraged patient to contact this office, call 911, or present to the nearest emergency room should any of these events occur. Discussed crisis intervention services and means to access.     MEDS ORDERED DURING VISIT:  New Medications Ordered This Visit   Medications   • venlafaxine XR (EFFEXOR-XR) 150 MG 24 hr capsule     Sig: Take 1 capsule by mouth Daily.     Dispense:  90 capsule     Refill:  0   • buPROPion XL (Wellbutrin XL) 150 MG 24 hr tablet     Sig: Take 1 tablet by mouth Every Morning.     Dispense:  90 tablet     Refill:  0   • propranolol (INDERAL) 10 MG tablet     Sig: Take 1 tablet by mouth 2 (Two) Times a Day As Needed (Anxiety).     Dispense:  60 tablet     Refill:  0   • hydrOXYzine (ATARAX) 25 MG tablet     Sig: Take 1 tablet by mouth 3 (Three) Times a Day As Needed for Anxiety.     Dispense:  90 tablet     Refill:  0       I spent 25 minutes caring for Deysi on this date of service. This time includes time spent by me in the following activities:preparing for the visit, performing a medically appropriate examination and/or evaluation , ordering medications, tests, or procedures and documenting information in the medical record    Follow Up   Return in about 4 weeks (around 1/4/2022) for Video visit.    Patient was given instructions and counseling regarding her condition or for health maintenance advice. Please see specific information pulled into the AVS if appropriate.         This document has been electronically signed by JASS Shin  December 7, 2021 08:46 EST    Part of this note may be an electronic transcription/translation of spoken language to printed text using the Dragon Dictation System.

## 2021-12-29 DIAGNOSIS — F40.10 SOCIAL ANXIETY DISORDER: ICD-10-CM

## 2021-12-29 DIAGNOSIS — F41.1 GENERALIZED ANXIETY DISORDER: ICD-10-CM

## 2021-12-29 RX ORDER — PROPRANOLOL HYDROCHLORIDE 10 MG/1
10 TABLET ORAL 2 TIMES DAILY PRN
Qty: 60 TABLET | Refills: 0 | Status: SHIPPED | OUTPATIENT
Start: 2021-12-29 | End: 2022-01-27

## 2021-12-29 RX ORDER — HYDROXYZINE HYDROCHLORIDE 25 MG/1
TABLET, FILM COATED ORAL
Qty: 90 TABLET | Refills: 0 | Status: SHIPPED | OUTPATIENT
Start: 2021-12-29 | End: 2022-01-27

## 2022-01-25 DIAGNOSIS — F41.1 GENERALIZED ANXIETY DISORDER: ICD-10-CM

## 2022-01-25 DIAGNOSIS — F40.10 SOCIAL ANXIETY DISORDER: ICD-10-CM

## 2022-01-25 NOTE — TELEPHONE ENCOUNTER
Received this refill request from pharmacy, but forwarding to you as it appears the patient follows with you.  Thanks.

## 2022-01-27 RX ORDER — PROPRANOLOL HYDROCHLORIDE 10 MG/1
10 TABLET ORAL 2 TIMES DAILY PRN
Qty: 60 TABLET | Refills: 0 | Status: SHIPPED | OUTPATIENT
Start: 2022-01-27

## 2022-01-27 RX ORDER — HYDROXYZINE HYDROCHLORIDE 25 MG/1
TABLET, FILM COATED ORAL
Qty: 90 TABLET | Refills: 0 | Status: SHIPPED | OUTPATIENT
Start: 2022-01-27

## 2022-01-31 ENCOUNTER — TELEMEDICINE (OUTPATIENT)
Dept: PSYCHIATRY | Facility: CLINIC | Age: 22
End: 2022-01-31

## 2022-01-31 DIAGNOSIS — F32.A ANXIETY AND DEPRESSION: ICD-10-CM

## 2022-01-31 DIAGNOSIS — F41.1 GENERALIZED ANXIETY DISORDER: ICD-10-CM

## 2022-01-31 DIAGNOSIS — F41.9 ANXIETY AND DEPRESSION: ICD-10-CM

## 2022-01-31 DIAGNOSIS — F33.0 MILD EPISODE OF RECURRENT MAJOR DEPRESSIVE DISORDER: ICD-10-CM

## 2022-01-31 DIAGNOSIS — F40.10 SOCIAL ANXIETY DISORDER: ICD-10-CM

## 2022-01-31 PROCEDURE — 99213 OFFICE O/P EST LOW 20 MIN: CPT

## 2022-01-31 RX ORDER — VENLAFAXINE HYDROCHLORIDE 150 MG/1
150 CAPSULE, EXTENDED RELEASE ORAL DAILY
Qty: 90 CAPSULE | Refills: 0 | Status: SHIPPED | OUTPATIENT
Start: 2022-01-31 | End: 2022-03-29 | Stop reason: SDUPTHER

## 2022-01-31 RX ORDER — BUPROPION HYDROCHLORIDE 150 MG/1
150 TABLET ORAL EVERY MORNING
Qty: 90 TABLET | Refills: 0 | Status: SHIPPED | OUTPATIENT
Start: 2022-01-31 | End: 2022-03-29 | Stop reason: SDUPTHER

## 2022-01-31 NOTE — PROGRESS NOTES
This provider is located at [place], [place]. The Patient is seen remotely using Video. Patient is being seen via telehealth and confirm that they are in a secure environment for this session. Patient is located in [place], [place] at [facility]. The patient's condition being diagnosed/treated is appropriate for telemedicine. Provider identified as Eitan Felipe as well as credentials JASS MSN PMHNP-BC.   The client/patient gave consent to be seen remotely, and when consent is given they understand that the consent allows for patient identifiable information to be sent to a third party as needed.  They may refuse to be seen remotely at any time. The electronic data is encrypted and password protected, and the patient has been advised of the potential risks to privacy not withstanding such measures.    Chief Complaint  Depression and Anxiety    Subjective        Deysi Ordoñez presents to [facility] for   History of Present Illness  Patient seen today for a follow-up visit for depression and anxiety.  At last visit patient was having some increased anxiety, but thought it was due to her having finals.  Patient states her anxiety is better now.  Currently rates her anxiety a 2 on a 1-10 scale with 10 being the worst.  States that she feels her medications are doing well.  States she takes the propanolol maybe every other week if she has a bad day.  States she did use the hydroxyzine a few times, but not frequently.  States she is also used the hydroxyzine to help with sleep.  Denies any panic attacks.  Rates her depression a 3-4 on a 1-10 scale with 10 being the worst.  States her sleep has been interrupted at times.  Appetite is good.  Denies any hopelessness.  Denies any suicidal or homicidal ideation.  Denies any manic type symptoms.  Denies any auditory or visual hallucinations.  Denies any paranoia.  Denies any side effects of the medications.  Objective   Vital Signs:   There  were no vitals taken for this visit.      PHQ-9 Score:   PHQ-9 Total Score: (P) 4     Mental Status Exam:   Hygiene:   good  Cooperation:  Cooperative  Eye Contact:  Good  Psychomotor Behavior:  Appropriate  Affect:  Full range  Mood: depressed and anxious  Speech:  Normal  Thought Process:  Goal directed  Thought Content:  Normal  Suicidal:  None  Homicidal:  None  Hallucinations:  None  Delusion:  None  Memory:  Intact  Orientation:  Person, Place, Time and Situation  Reliability:  good  Insight:  Good  Judgement:  Good  Impulse Control:  Good  Physical/Medical Issues:  No      PHQ-9 Depression Screening  Little interest or pleasure in doing things? (P) 0   Feeling down, depressed, or hopeless? (P) 1   Trouble falling or staying asleep, or sleeping too much? (P) 1   Feeling tired or having little energy? (P) 2   Poor appetite or overeating? (P) 0   Feeling bad about yourself - or that you are a failure or have let yourself or your family down? (P) 0   Trouble concentrating on things, such as reading the newspaper or watching television? (P) 0   Moving or speaking so slowly that other people could have noticed? Or the opposite - being so fidgety or restless that you have been moving around a lot more than usual? (P) 0   Thoughts that you would be better off dead, or of hurting yourself in some way? (P) 0   PHQ-9 Total Score (P) 4   If you checked off any problems, how difficult have these problems made it for you to do your work, take care of things at home, or get along with other people? (P) Somewhat difficult     PHQ-9 Total Score: (P) 4    GARRETT 7 anxiety screening tool that patient filled out virtually reviewed by this APRN at today's encounter.    PROMIS scale screening tool that patient filled out virtually reviewed by this APRN at today's encounter.    Previous Provider notes and available records reviewed by this APRN today.   Current Medications:   Current Outpatient Medications   Medication Sig Dispense  Refill   • albuterol (ACCUNEB) 1.25 MG/3ML nebulizer solution Take 1 ampule by nebulization Every 6 (Six) Hours As Needed.     • buPROPion XL (Wellbutrin XL) 150 MG 24 hr tablet Take 1 tablet by mouth Every Morning. 90 tablet 0   • fluticasone (VERAMYST) 27.5 MCG/SPRAY nasal spray 2 sprays into the nostril(s) as directed by provider Daily.     • Fluticasone Furoate-Vilanterol (Breo Ellipta) 100-25 MCG/INH inhaler      • hydrOXYzine (ATARAX) 25 MG tablet TAKE 1 TABLET BY MOUTH THREE TIMES A DAY AS NEEDED FOR ANXIETY 90 tablet 0   • montelukast (SINGULAIR) 10 MG tablet      • naproxen (NAPROSYN) 500 MG tablet Take 1 tablet by mouth 2 (Two) Times a Day With Meals. 30 tablet 5   • norethindrone-ethinyl estradiol FE (Junel FE 1/20) 1-20 MG-MCG per tablet Take 1 tablet by mouth Daily for 112 days. Skip placebo week. 28 tablet 3   • propranolol (INDERAL) 10 MG tablet TAKE 1 TABLET BY MOUTH 2 (TWO) TIMES A DAY AS NEEDED (ANXIETY). 60 tablet 0   • SUMAtriptan (IMITREX) 50 MG tablet Take one tablet at onset of headache. May repeat dose one time in 2 hours if headache not relieved. 12 tablet 2   • venlafaxine XR (EFFEXOR-XR) 150 MG 24 hr capsule Take 1 capsule by mouth Daily. 90 capsule 0     No current facility-administered medications for this visit.       Physical Exam   Result Review :    The following data was reviewed by: JASS Shin on 01/31/2022:  Common labs    Common Labsle 8/5/21 8/5/21    0915 0915   Total Cholesterol  171   Triglycerides  67   HDL Cholesterol  56   LDL Cholesterol   102 (A)   Hemoglobin A1C 4.80    (A) Abnormal value               Assessment and Plan   Problem List Items Addressed This Visit        Mental Health    Anxiety and depression    Relevant Medications    venlafaxine XR (EFFEXOR-XR) 150 MG 24 hr capsule    buPROPion XL (Wellbutrin XL) 150 MG 24 hr tablet    Mild episode of recurrent major depressive disorder (HCC)    Relevant Medications    venlafaxine XR (EFFEXOR-XR) 150 MG 24  hr capsule    buPROPion XL (Wellbutrin XL) 150 MG 24 hr tablet    Generalized anxiety disorder    Relevant Medications    venlafaxine XR (EFFEXOR-XR) 150 MG 24 hr capsule    buPROPion XL (Wellbutrin XL) 150 MG 24 hr tablet    Social anxiety disorder    Relevant Medications    venlafaxine XR (EFFEXOR-XR) 150 MG 24 hr capsule    buPROPion XL (Wellbutrin XL) 150 MG 24 hr tablet        Patient reports doing well on the current medications.  Patient feels that these are working well for her.  She does not think a change is warranted at this time, and this APRN agrees.  Continue Effexor  mg daily for depression and anxiety.  Continue Wellbutrin  mg daily for depression.  Continue propanolol 10 mg twice daily as needed for anxiety.  Continue hydroxyzine 25 mg 3 times daily as needed for anxiety/sleep.  Discussed with patient that since she has been stable on these medications for a couple of months that we could extend the frequency of her follow-up appointments.  Patient states she would like to do that.  We will see patient again in 2 months to reassess, but informed patient she could call the office if she had any problems before that time.  Patient agreed and verbalized understanding.    TREATMENT PLAN/GOALS: Continue supportive psychotherapy efforts and medications as indicated. Treatment and medication options discussed during today's visit. Patient ackowledged and verbally consented to continue with current treatment plan and was educated on the importance of compliance with treatment and follow-up appointments.    DEPRESSION:  Patient screened positive for depression based on a PHQ-9 score of 4 on 1/29/2022. Follow-up recommendations include: Prescribed antidepressant medication treatment.       MEDICATION ISSUES:  We discussed risks, benefits, and side effects of the above medications and the patient was agreeable with the plan. Patient was educated on the importance of compliance with treatment and  follow-up appointments.  Patient is agreeable to call the office with any worsening of symptoms or onset of side effects. Patient is agreeable to call 911 or go to the nearest ER should he/she begin having SI/HI.      Counseled patient regarding multimodal approach with healthy nutrition, healthy sleep, regular physical activity, social activities, counseling, and medications.      Coping skills reviewed and encouraged positive framing of thoughts     Assisted patient in processing above session content; acknowledged and normalized patient’s thoughts, feelings, and concerns.  Applied  positive coping skills and behavior management in session.  Allowed patient to freely discuss issues without interruption or judgment. Provided safe, confidential environment to facilitate the development of positive therapeutic relationship and encourage open, honest communication. Assisted patient in identifying risk factors which would indicate the need for higher level of care including thoughts to harm self or others and/or self-harming behavior and encouraged patient to contact this office, call 911, or present to the nearest emergency room should any of these events occur. Discussed crisis intervention services and means to access.     MEDS ORDERED DURING VISIT:  New Medications Ordered This Visit   Medications   • venlafaxine XR (EFFEXOR-XR) 150 MG 24 hr capsule     Sig: Take 1 capsule by mouth Daily.     Dispense:  90 capsule     Refill:  0   • buPROPion XL (Wellbutrin XL) 150 MG 24 hr tablet     Sig: Take 1 tablet by mouth Every Morning.     Dispense:  90 tablet     Refill:  0         Follow Up   Return in about 2 months (around 3/31/2022) for Video visit.    Patient was given instructions and counseling regarding her condition or for health maintenance advice. Please see specific information pulled into the AVS if appropriate.         This document has been electronically signed by JASS Shin  January 31, 2022  10:10 EST    Part of this note may be an electronic transcription/translation of spoken language to printed text using the Dragon Dictation System.

## 2022-02-24 DIAGNOSIS — F40.10 SOCIAL ANXIETY DISORDER: ICD-10-CM

## 2022-02-24 DIAGNOSIS — F41.1 GENERALIZED ANXIETY DISORDER: ICD-10-CM

## 2022-02-24 RX ORDER — PROPRANOLOL HYDROCHLORIDE 10 MG/1
10 TABLET ORAL 2 TIMES DAILY PRN
Qty: 60 TABLET | Refills: 0 | OUTPATIENT
Start: 2022-02-24

## 2022-02-24 RX ORDER — HYDROXYZINE HYDROCHLORIDE 25 MG/1
TABLET, FILM COATED ORAL
Qty: 90 TABLET | Refills: 0 | OUTPATIENT
Start: 2022-02-24

## 2022-03-29 ENCOUNTER — TELEMEDICINE (OUTPATIENT)
Dept: PSYCHIATRY | Facility: CLINIC | Age: 22
End: 2022-03-29

## 2022-03-29 DIAGNOSIS — F32.A ANXIETY AND DEPRESSION: ICD-10-CM

## 2022-03-29 DIAGNOSIS — F40.10 SOCIAL ANXIETY DISORDER: ICD-10-CM

## 2022-03-29 DIAGNOSIS — F41.1 GENERALIZED ANXIETY DISORDER: Chronic | ICD-10-CM

## 2022-03-29 DIAGNOSIS — F33.0 MILD EPISODE OF RECURRENT MAJOR DEPRESSIVE DISORDER: Chronic | ICD-10-CM

## 2022-03-29 DIAGNOSIS — F41.9 ANXIETY AND DEPRESSION: ICD-10-CM

## 2022-03-29 PROCEDURE — 99214 OFFICE O/P EST MOD 30 MIN: CPT

## 2022-03-29 RX ORDER — BUPROPION HYDROCHLORIDE 150 MG/1
150 TABLET ORAL EVERY MORNING
Qty: 90 TABLET | Refills: 0 | Status: SHIPPED | OUTPATIENT
Start: 2022-03-29 | End: 2022-06-30 | Stop reason: SDUPTHER

## 2022-03-29 RX ORDER — VENLAFAXINE HYDROCHLORIDE 150 MG/1
150 CAPSULE, EXTENDED RELEASE ORAL DAILY
Qty: 90 CAPSULE | Refills: 0 | Status: SHIPPED | OUTPATIENT
Start: 2022-03-29 | End: 2022-06-30 | Stop reason: SDUPTHER

## 2022-03-29 NOTE — PROGRESS NOTES
This provider is located at [place], [place]. The Patient is seen remotely using Video. Patient is being seen via telehealth and confirm that they are in a secure environment for this session. Patient is located in [place], [place] at her home. The patient's condition being diagnosed/treated is appropriate for telemedicine. Provider identified as Eitan Felipe as well as credentials APRN MSN PMHNP-BC.   The client/patient gave consent to be seen remotely, and when consent is given they understand that the consent allows for patient identifiable information to be sent to a third party as needed.  They may refuse to be seen remotely at any time. The electronic data is encrypted and password protected, and the patient has been advised of the potential risks to privacy not withstanding such measures.    Chief Complaint  Depression and Anxiety    Subjective        Deysi Ordoñez presents to [facility] for   History of Present Illness  Patient is seen today for a follow-up visit for depression and anxiety.  Patient reports that she continues to do well.  States her school work is going well.  States she has a couple years left before she graduates.  She currently rates her depression a 2-3 on a 1-10 scale with 10 being the worst.  Denies any feelings of hopelessness or sadness.  Denies any suicidal or homicidal ideation.  States her sleep is good, but she just does not get enough sleep due to her schedule.  States her appetite is good.  Rates her anxiety 3 on a 1-10 scale with 10 being the worst.  States that propanolol and hydroxyzine continue to be helpful for her.  Denies any panic attacks.  States she has had some irritability at times with coworkers.  Denies any manic type symptoms.  Denies any paranoia.  Denies any auditory or visual hallucinations.  Denies any side effects to the medications.  Objective   Vital Signs:   There were no vitals taken for this visit.      Mental  Status Exam:   Hygiene:   good  Cooperation:  Cooperative  Eye Contact:  Good  Psychomotor Behavior:  Appropriate  Affect:  Full range  Mood: anxious  Speech:  Normal  Thought Process:  Goal directed  Thought Content:  Normal  Suicidal:  None  Homicidal:  None  Hallucinations:  None  Delusion:  None  Memory:  Intact  Orientation:  Person, Place, Time and Situation  Reliability:  good  Insight:  Good  Judgement:  Good  Impulse Control:  Good  Physical/Medical Issues:  No      PHQ-2 Depression Screening  Little interest or pleasure in doing things? 0-->not at all   Feeling down, depressed, or hopeless? 0-->not at all   PHQ-2 Total Score 0     GARRETT 7 anxiety screening tool that patient filled out virtually reviewed by this APRN at today's encounter.    PROMIS scale screening tool that patient filled out virtually reviewed by this APRN at today's encounter.    Previous Provider notes and available records reviewed by this APRN today.   Current Medications:   Current Outpatient Medications   Medication Sig Dispense Refill   • albuterol (ACCUNEB) 1.25 MG/3ML nebulizer solution Take 1 ampule by nebulization Every 6 (Six) Hours As Needed.     • buPROPion XL (Wellbutrin XL) 150 MG 24 hr tablet Take 1 tablet by mouth Every Morning. 90 tablet 0   • fluticasone (VERAMYST) 27.5 MCG/SPRAY nasal spray 2 sprays into the nostril(s) as directed by provider Daily.     • Fluticasone Furoate-Vilanterol (Breo Ellipta) 100-25 MCG/INH inhaler      • hydrOXYzine (ATARAX) 25 MG tablet TAKE 1 TABLET BY MOUTH THREE TIMES A DAY AS NEEDED FOR ANXIETY 90 tablet 0   • montelukast (SINGULAIR) 10 MG tablet      • naproxen (NAPROSYN) 500 MG tablet Take 1 tablet by mouth 2 (Two) Times a Day With Meals. 30 tablet 5   • norethindrone-ethinyl estradiol FE (Junel FE 1/20) 1-20 MG-MCG per tablet Take 1 tablet by mouth Daily for 112 days. Skip placebo week. 28 tablet 3   • propranolol (INDERAL) 10 MG tablet TAKE 1 TABLET BY MOUTH 2 (TWO) TIMES A DAY AS  NEEDED (ANXIETY). 60 tablet 0   • SUMAtriptan (IMITREX) 50 MG tablet Take one tablet at onset of headache. May repeat dose one time in 2 hours if headache not relieved. 12 tablet 2   • venlafaxine XR (EFFEXOR-XR) 150 MG 24 hr capsule Take 1 capsule by mouth Daily. 90 capsule 0     No current facility-administered medications for this visit.       Physical Exam   Result Review :    The following data was reviewed by: JASS Shin on 03/29/2022:  Common labs    Common Labsle 8/5/21 8/5/21    0915 0915   Total Cholesterol  171   Triglycerides  67   HDL Cholesterol  56   LDL Cholesterol   102 (A)   Hemoglobin A1C 4.80    (A) Abnormal value               Assessment and Plan   Problem List Items Addressed This Visit        Mental Health    Anxiety and depression    Relevant Medications    venlafaxine XR (EFFEXOR-XR) 150 MG 24 hr capsule    buPROPion XL (Wellbutrin XL) 150 MG 24 hr tablet    Mild episode of recurrent major depressive disorder (HCC)    Relevant Medications    venlafaxine XR (EFFEXOR-XR) 150 MG 24 hr capsule    buPROPion XL (Wellbutrin XL) 150 MG 24 hr tablet    Generalized anxiety disorder    Relevant Medications    venlafaxine XR (EFFEXOR-XR) 150 MG 24 hr capsule    buPROPion XL (Wellbutrin XL) 150 MG 24 hr tablet    Social anxiety disorder    Relevant Medications    venlafaxine XR (EFFEXOR-XR) 150 MG 24 hr capsule    buPROPion XL (Wellbutrin XL) 150 MG 24 hr tablet        Patient continues to do well on the current medications.  She is satisfied with the results she is got from Effexor and Wellbutrin.  She would like to continue the current medications.  Continue Wellbutrin  mg daily for depression.  Continue Effexor  mg daily for depression and anxiety.  Patient has a supply of propanolol and hydroxyzine on hand and states she does not need refills today on those.  We will see patient again in 3 months to reassess.  Patient informed to call sooner if any problems arise.  Patient  agreed and verbalized understanding.    TREATMENT PLAN/GOALS: Continue supportive psychotherapy efforts and medications as indicated. Treatment and medication options discussed during today's visit. Patient ackowledged and verbally consented to continue with current treatment plan and was educated on the importance of compliance with treatment and follow-up appointments.    DEPRESSION:  Patient screened positive for depression based on a PHQ-9 score of 0 on 3/29/2022. Follow-up recommendations include: Prescribed antidepressant medication treatment.       MEDICATION ISSUES:  We discussed risks, benefits, and side effects of the above medications and the patient was agreeable with the plan. Patient was educated on the importance of compliance with treatment and follow-up appointments.  Patient is agreeable to call the office with any worsening of symptoms or onset of side effects. Patient is agreeable to call 911 or go to the nearest ER should he/she begin having SI/HI.      Counseled patient regarding multimodal approach with healthy nutrition, healthy sleep, regular physical activity, social activities, counseling, and medications.      Coping skills reviewed and encouraged positive framing of thoughts     Assisted patient in processing above session content; acknowledged and normalized patient’s thoughts, feelings, and concerns.  Applied  positive coping skills and behavior management in session.  Allowed patient to freely discuss issues without interruption or judgment. Provided safe, confidential environment to facilitate the development of positive therapeutic relationship and encourage open, honest communication. Assisted patient in identifying risk factors which would indicate the need for higher level of care including thoughts to harm self or others and/or self-harming behavior and encouraged patient to contact this office, call 911, or present to the nearest emergency room should any of these events occur.  Discussed crisis intervention services and means to access.     MEDS ORDERED DURING VISIT:  New Medications Ordered This Visit   Medications   • venlafaxine XR (EFFEXOR-XR) 150 MG 24 hr capsule     Sig: Take 1 capsule by mouth Daily.     Dispense:  90 capsule     Refill:  0   • buPROPion XL (Wellbutrin XL) 150 MG 24 hr tablet     Sig: Take 1 tablet by mouth Every Morning.     Dispense:  90 tablet     Refill:  0         Follow Up   Return in about 3 months (around 6/29/2022) for Video visit.    Patient was given instructions and counseling regarding her condition or for health maintenance advice. Please see specific information pulled into the AVS if appropriate.         This document has been electronically signed by JASS Shin  March 29, 2022 08:15 EDT    Part of this note may be an electronic transcription/translation of spoken language to printed text using the Dragon Dictation System.

## 2022-05-10 RX ORDER — NORETHINDRONE ACETATE AND ETHINYL ESTRADIOL 1MG-20(21)
1 KIT ORAL DAILY
Qty: 28 TABLET | Refills: 0 | Status: SHIPPED | OUTPATIENT
Start: 2022-05-10 | End: 2022-06-07 | Stop reason: SDUPTHER

## 2022-05-10 NOTE — TELEPHONE ENCOUNTER
Patient called requesting a refill on her prescription for norethindrone-ethinyl estradiol FE 1-20 MG-MCG. She is completely out of this medication until her annual appointment scheduled on 06/07/2022 with Dr. Persaud. Patient is requesting a phone call back once this request if fulfilled ([phone]).    The pharmacy is [facility] on [address] in [place].
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
## 2022-06-07 ENCOUNTER — OFFICE VISIT (OUTPATIENT)
Dept: OBSTETRICS AND GYNECOLOGY | Facility: CLINIC | Age: 22
End: 2022-06-07

## 2022-06-07 VITALS
BODY MASS INDEX: 45.99 KG/M2 | WEIGHT: 293 LBS | RESPIRATION RATE: 14 BRPM | SYSTOLIC BLOOD PRESSURE: 130 MMHG | HEIGHT: 67 IN | DIASTOLIC BLOOD PRESSURE: 82 MMHG

## 2022-06-07 DIAGNOSIS — Z01.419 WOMEN'S ANNUAL ROUTINE GYNECOLOGICAL EXAMINATION: ICD-10-CM

## 2022-06-07 DIAGNOSIS — R87.610 ASCUS WITH POSITIVE HIGH RISK HPV CERVICAL: ICD-10-CM

## 2022-06-07 DIAGNOSIS — R87.810 ASCUS WITH POSITIVE HIGH RISK HPV CERVICAL: ICD-10-CM

## 2022-06-07 DIAGNOSIS — R87.610 ASCUS OF CERVIX WITH NEGATIVE HIGH RISK HPV: Primary | ICD-10-CM

## 2022-06-07 DIAGNOSIS — Z30.40 ENCOUNTER FOR REFILL OF PRESCRIPTION FOR CONTRACEPTION: ICD-10-CM

## 2022-06-07 PROCEDURE — 99395 PREV VISIT EST AGE 18-39: CPT | Performed by: STUDENT IN AN ORGANIZED HEALTH CARE EDUCATION/TRAINING PROGRAM

## 2022-06-07 RX ORDER — NORETHINDRONE ACETATE AND ETHINYL ESTRADIOL 1MG-20(21)
1 KIT ORAL DAILY
Qty: 28 TABLET | Refills: 12 | Status: SHIPPED | OUTPATIENT
Start: 2022-06-07 | End: 2022-11-15

## 2022-06-09 LAB — REF LAB TEST METHOD: NORMAL

## 2022-06-30 ENCOUNTER — TELEMEDICINE (OUTPATIENT)
Dept: PSYCHIATRY | Facility: CLINIC | Age: 22
End: 2022-06-30

## 2022-06-30 DIAGNOSIS — F33.0 MILD EPISODE OF RECURRENT MAJOR DEPRESSIVE DISORDER: Chronic | ICD-10-CM

## 2022-06-30 DIAGNOSIS — F40.10 SOCIAL ANXIETY DISORDER: ICD-10-CM

## 2022-06-30 DIAGNOSIS — F41.1 GENERALIZED ANXIETY DISORDER: Chronic | ICD-10-CM

## 2022-06-30 PROCEDURE — 99214 OFFICE O/P EST MOD 30 MIN: CPT

## 2022-06-30 RX ORDER — BUPROPION HYDROCHLORIDE 150 MG/1
150 TABLET ORAL EVERY MORNING
Qty: 90 TABLET | Refills: 0 | Status: SHIPPED | OUTPATIENT
Start: 2022-06-30 | End: 2022-10-12

## 2022-06-30 RX ORDER — VENLAFAXINE HYDROCHLORIDE 150 MG/1
150 CAPSULE, EXTENDED RELEASE ORAL DAILY
Qty: 90 CAPSULE | Refills: 0 | Status: SHIPPED | OUTPATIENT
Start: 2022-06-30 | End: 2022-10-12

## 2022-06-30 NOTE — PROGRESS NOTES
This provider is located at [place], [place]. The Patient is seen remotely using Video. Patient is being seen via telehealth and confirm that they are in a secure environment for this session. Patient is located in [place], [place] at her home. The patient's condition being diagnosed/treated is appropriate for telemedicine. Provider identified as Eitan Felipe as well as credentials APRN MSN PMHNP-BC.   The client/patient gave consent to be seen remotely, and when consent is given they understand that the consent allows for patient identifiable information to be sent to a third party as needed.  They may refuse to be seen remotely at any time. The electronic data is encrypted and password protected, and the patient has been advised of the potential risks to privacy not withstanding such measures.    Chief Complaint  Depression and Anxiety    Subjective        Deysi Ordoñez presents to [facility] for   History of Present Illness  Patient seen today for a follow-up visit for depression and anxiety.  Patient reports that she has had additional stressors recently.  States this has exacerbated her depression.  She states her dog that she got when she was 14 years old passed away.  States they found a mass on him and he had to be put down pretty quickly.  She states this has been very hard on her.  States she has had decreased energy, sleeping more, not taking care of things around her apartment where she needs to.  Currently rates her depression a 5-6 on a 1-10 scale with 10 being the worst.  She denies any suicidal or homicidal thoughts.  She states she has been sleeping too much.  States her appetite is good.  States anxiety has been good.  Rates it a 2-3 on a 1-10 scale with 10 being the worst.  States she has had some worry and over thinking, but overall anxiety has been under control.  She states she has to take the hydroxyzine and propranolol maybe once per week.  She  denies any manic type symptoms.  Denies any paranoia.  Denies any auditory or visual hallucinations.  Denies any side effects to the medications.  Patient states that she is currently out of school and summer break.  Does spend some time at the lab doing research.  Objective   Vital Signs:   There were no vitals taken for this visit.      Mental Status Exam:   Hygiene:   good  Cooperation:  Cooperative  Eye Contact:  Good  Psychomotor Behavior:  Appropriate  Affect:  Full range  Mood: depressed  Speech:  Normal  Thought Process:  Goal directed  Thought Content:  Normal  Suicidal:  None  Homicidal:  None  Hallucinations:  None  Delusion:  None  Memory:  Intact  Orientation:  Person, Place, Time and Situation  Reliability:  good  Insight:  Good  Judgement:  Good  Impulse Control:  Good  Physical/Medical Issues:  No      PHQ-9 Depression Screening  Little interest or pleasure in doing things? 2-->more than half the days   Feeling down, depressed, or hopeless? 2-->more than half the days   Trouble falling or staying asleep, or sleeping too much? 3-->nearly every day   Feeling tired or having little energy? 3-->nearly every day   Poor appetite or overeating? 0-->not at all   Feeling bad about yourself - or that you are a failure or have let yourself or your family down? 0-->not at all   Trouble concentrating on things, such as reading the newspaper or watching television? 0-->not at all   Moving or speaking so slowly that other people could have noticed? Or the opposite - being so fidgety or restless that you have been moving around a lot more than usual? 0-->not at all   Thoughts that you would be better off dead, or of hurting yourself in some way? 0-->not at all   PHQ-9 Total Score 10   If you checked off any problems, how difficult have these problems made it for you to do your work, take care of things at home, or get along with other people? somewhat difficult     PHQ-9 Total Score: 10     GARRETT 7 anxiety screening  tool that patient filled out virtually reviewed by this APRN at today's encounter.    PROMIS scale screening tool that patient filled out virtually reviewed by this APRN at today's encounter.    Previous Provider notes and available records reviewed by this APRN today.   Current Medications:   Current Outpatient Medications   Medication Sig Dispense Refill   • albuterol (ACCUNEB) 1.25 MG/3ML nebulizer solution Take 1 ampule by nebulization Every 6 (Six) Hours As Needed.     • buPROPion XL (Wellbutrin XL) 150 MG 24 hr tablet Take 1 tablet by mouth Every Morning. 90 tablet 0   • fluticasone (VERAMYST) 27.5 MCG/SPRAY nasal spray 2 sprays into the nostril(s) as directed by provider Daily.     • Fluticasone Furoate-Vilanterol (Breo Ellipta) 100-25 MCG/INH inhaler      • hydrOXYzine (ATARAX) 25 MG tablet TAKE 1 TABLET BY MOUTH THREE TIMES A DAY AS NEEDED FOR ANXIETY 90 tablet 0   • montelukast (SINGULAIR) 10 MG tablet      • naproxen (NAPROSYN) 500 MG tablet Take 1 tablet by mouth 2 (Two) Times a Day With Meals. 30 tablet 5   • norethindrone-ethinyl estradiol FE (Junel FE 1/20) 1-20 MG-MCG per tablet Take 1 tablet by mouth Daily for 112 days. Skip placebo week. 28 tablet 12   • propranolol (INDERAL) 10 MG tablet TAKE 1 TABLET BY MOUTH 2 (TWO) TIMES A DAY AS NEEDED (ANXIETY). 60 tablet 0   • SUMAtriptan (IMITREX) 50 MG tablet Take one tablet at onset of headache. May repeat dose one time in 2 hours if headache not relieved. 12 tablet 2   • venlafaxine XR (EFFEXOR-XR) 150 MG 24 hr capsule Take 1 capsule by mouth Daily. 90 capsule 0     No current facility-administered medications for this visit.       Physical Exam   Result Review :    The following data was reviewed by: JASS Shin on 06/30/2022:  Common labs    Common Labsle 8/5/21 8/5/21    0915 0915   Total Cholesterol  171   Triglycerides  67   HDL Cholesterol  56   LDL Cholesterol   102 (A)   Hemoglobin A1C 4.80    (A) Abnormal value               Assessment  and Plan   Problem List Items Addressed This Visit        Mental Health    Mild episode of recurrent major depressive disorder (HCC)    Relevant Medications    buPROPion XL (Wellbutrin XL) 150 MG 24 hr tablet    venlafaxine XR (EFFEXOR-XR) 150 MG 24 hr capsule    Generalized anxiety disorder    Relevant Medications    buPROPion XL (Wellbutrin XL) 150 MG 24 hr tablet    venlafaxine XR (EFFEXOR-XR) 150 MG 24 hr capsule    Social anxiety disorder    Relevant Medications    buPROPion XL (Wellbutrin XL) 150 MG 24 hr tablet    venlafaxine XR (EFFEXOR-XR) 150 MG 24 hr capsule        Discussed treatment options with patient.  Discussed with her that it is important not to adjust her medications too quickly that the grieving process does carry some depression with that.  Discussed with her that if the depression lingers longer than a few weeks that a medication change may be warranted.  Patient agrees.  Patient states prior to her dog passing away that she was doing really well.  States she did get to fly to [place] to see him before he passed away as he was still living with her parents.  Patient shows this APRN a picture of her daughter on the phone.  She shared some memories of her dog.  Discussed with patient that we can schedule a visit in 2 weeks time to reassess to see if a medication change is warranted or not.  Patient would like to do so.  We will see in 2 weeks to reassess.  Continue Wellbutrin  mg daily for depression.  Continue Effexor  mg daily for depression and anxiety.  Patient has a supply of hydroxyzine and propanolol on hand if she were to need it.    TREATMENT PLAN/GOALS: Continue supportive psychotherapy efforts and medications as indicated. Treatment and medication options discussed during today's visit. Patient ackowledged and verbally consented to continue with current treatment plan and was educated on the importance of compliance with treatment and follow-up  appointments.    DEPRESSION:  Patient screened positive for depression based on a PHQ-9 score of 10 on 6/30/2022. Follow-up recommendations include: Prescribed antidepressant medication treatment.       MEDICATION ISSUES:  We discussed risks, benefits, and side effects of the above medications and the patient was agreeable with the plan. Patient was educated on the importance of compliance with treatment and follow-up appointments.  Patient is agreeable to call the office with any worsening of symptoms or onset of side effects. Patient is agreeable to call 911 or go to the nearest ER should he/she begin having SI/HI.      Counseled patient regarding multimodal approach with healthy nutrition, healthy sleep, regular physical activity, social activities, counseling, and medications.      Coping skills reviewed and encouraged positive framing of thoughts     Assisted patient in processing above session content; acknowledged and normalized patient’s thoughts, feelings, and concerns.  Applied  positive coping skills and behavior management in session.  Allowed patient to freely discuss issues without interruption or judgment. Provided safe, confidential environment to facilitate the development of positive therapeutic relationship and encourage open, honest communication. Assisted patient in identifying risk factors which would indicate the need for higher level of care including thoughts to harm self or others and/or self-harming behavior and encouraged patient to contact this office, call 911, or present to the nearest emergency room should any of these events occur. Discussed crisis intervention services and means to access. If patient has any concerns or needs assistance they were instructed to call the [facility] at [phone].    MEDS ORDERED DURING VISIT:  New Medications Ordered This Visit   Medications   • buPROPion XL (Wellbutrin XL) 150 MG 24 hr tablet     Sig: Take 1 tablet by  mouth Every Morning.     Dispense:  90 tablet     Refill:  0   • venlafaxine XR (EFFEXOR-XR) 150 MG 24 hr capsule     Sig: Take 1 capsule by mouth Daily.     Dispense:  90 capsule     Refill:  0         Follow Up   Return in about 2 weeks (around 7/14/2022) for Video visit.    Patient was given instructions and counseling regarding her condition or for health maintenance advice. Please see specific information pulled into the AVS if appropriate.         This document has been electronically signed by JASS Shin  June 30, 2022 15:33 EDT    Part of this note may be an electronic transcription/translation of spoken language to printed text using the Dragon Dictation System.

## 2022-07-21 ENCOUNTER — OFFICE VISIT (OUTPATIENT)
Dept: INTERNAL MEDICINE | Facility: CLINIC | Age: 22
End: 2022-07-21

## 2022-07-21 VITALS
DIASTOLIC BLOOD PRESSURE: 88 MMHG | SYSTOLIC BLOOD PRESSURE: 134 MMHG | HEIGHT: 67 IN | WEIGHT: 293 LBS | TEMPERATURE: 98.3 F | HEART RATE: 89 BPM | OXYGEN SATURATION: 98 % | BODY MASS INDEX: 45.99 KG/M2

## 2022-07-21 DIAGNOSIS — E66.01 CLASS 3 SEVERE OBESITY DUE TO EXCESS CALORIES WITHOUT SERIOUS COMORBIDITY WITH BODY MASS INDEX (BMI) OF 45.0 TO 49.9 IN ADULT: ICD-10-CM

## 2022-07-21 DIAGNOSIS — R03.0 ELEVATED BP WITHOUT DIAGNOSIS OF HYPERTENSION: ICD-10-CM

## 2022-07-21 DIAGNOSIS — J30.81 ALLERGIC RHINITIS DUE TO ANIMAL HAIR AND DANDER: ICD-10-CM

## 2022-07-21 DIAGNOSIS — R53.82 CHRONIC FATIGUE: ICD-10-CM

## 2022-07-21 DIAGNOSIS — Z00.00 ANNUAL PHYSICAL EXAM: Primary | ICD-10-CM

## 2022-07-21 DIAGNOSIS — E78.00 HYPERCHOLESTEROLEMIA: ICD-10-CM

## 2022-07-21 DIAGNOSIS — F41.9 ANXIETY AND DEPRESSION: ICD-10-CM

## 2022-07-21 DIAGNOSIS — F32.A ANXIETY AND DEPRESSION: ICD-10-CM

## 2022-07-21 DIAGNOSIS — G43.709 CHRONIC MIGRAINE WITHOUT AURA WITHOUT STATUS MIGRAINOSUS, NOT INTRACTABLE: ICD-10-CM

## 2022-07-21 DIAGNOSIS — J45.30 MILD PERSISTENT ASTHMA WITHOUT COMPLICATION: ICD-10-CM

## 2022-07-21 PROBLEM — E66.813 CLASS 3 SEVERE OBESITY DUE TO EXCESS CALORIES WITHOUT SERIOUS COMORBIDITY WITH BODY MASS INDEX (BMI) OF 45.0 TO 49.9 IN ADULT: Status: ACTIVE | Noted: 2022-07-21

## 2022-07-21 PROCEDURE — 99395 PREV VISIT EST AGE 18-39: CPT | Performed by: INTERNAL MEDICINE

## 2022-07-21 RX ORDER — SUMATRIPTAN 50 MG/1
TABLET, FILM COATED ORAL
Qty: 12 TABLET | Refills: 2 | Status: SHIPPED | OUTPATIENT
Start: 2022-07-21

## 2022-07-21 RX ORDER — NAPROXEN 500 MG/1
500 TABLET ORAL 2 TIMES DAILY PRN
Qty: 30 TABLET | Refills: 5 | Status: SHIPPED | OUTPATIENT
Start: 2022-07-21

## 2022-07-21 RX ORDER — SODIUM FLUORIDE1.1%, POTASSIUM NITRATE 5% 5.8; 57.5 MG/ML; MG/ML
GEL, DENTIFRICE DENTAL
COMMUNITY
Start: 2022-07-10

## 2022-07-21 NOTE — PROGRESS NOTES
Internal Medicine Annual Exam  Deysi Ordoñez is a 22 y.o. female who presents today for an annual exam and with concerns as outlined below.    Chief Complaint  Chief Complaint   Patient presents with   • Annual Exam   • Anxiety   • Depression        HPI  Ms. Ordoñez comes in for her physical. She continues to see her allergist and psychiatrist. She recently had GYN exam. She reports COVID19 vaccines and Tdap up to date. Dental cleaning is up to date but vision exam is due. She notes increase in fatigue x6m. She reports she is napping and sleeping more than typical despite having desire to do things. She discussed this with her psychiatrist who increased dose of medication without improvement. She denies feeling depressed. She tried getting exercise and eating healthy x1 week with no improvement. She sleeps well but does snore. Wakes feeling refreshed. She has no other associated symptoms. Denies use of tobacco, ecigarettes, illicit drugs, and alcohol.         Review of Systems  Review of Systems   Constitutional: Positive for fatigue. Negative for activity change, appetite change, fever and unexpected weight loss.   HENT: Negative for hearing loss.    Eyes: Negative.    Respiratory: Negative.    Cardiovascular: Negative.    Gastrointestinal: Negative.    Genitourinary: Negative.    Musculoskeletal: Negative.    Skin: Negative.    Neurological: Negative.    Psychiatric/Behavioral: Negative.         Past Medical History  Past Medical History:   Diagnosis Date   • Allergic    • Anxiety    • Asthma    • Depression    • Diarrhea 2/11/2021   • Migraines         Surgical History  Past Surgical History:   Procedure Laterality Date   • ADENOIDECTOMY  05/2018   • NASAL POLYP SURGERY  4/2009, 5/2018   • NASAL SEPTUM SURGERY  4/2009, 5/2018   • NASAL SINUS SURGERY  05/2018   • TURBINATE RESECTION  05/2018   • WISDOM TOOTH EXTRACTION  08/2011        Family History  Family History   Problem Relation Age of Onset   • Thyroid  disease Mother    • Hypertension Maternal Grandmother    • Arthritis Maternal Grandmother    • Depression Maternal Grandmother    • Hypertension Maternal Grandfather    • Depression Maternal Grandfather    • Depression Paternal Grandmother    • Dementia Paternal Grandmother    • Depression Paternal Grandfather    • Breast cancer Paternal Aunt    • Ovarian cancer Neg Hx    • Uterine cancer Neg Hx    • Endometrial cancer Neg Hx    • Colon cancer Neg Hx         Social History  Social History     Socioeconomic History   • Marital status: Single   Tobacco Use   • Smoking status: Never Smoker   • Smokeless tobacco: Never Used   Vaping Use   • Vaping Use: Never used   Substance and Sexual Activity   • Alcohol use: Yes     Alcohol/week: 2.0 standard drinks     Types: 2 Glasses of wine per week     Comment: once a week   • Drug use: Never   • Sexual activity: Never     Birth control/protection: OCP        Current Medications  Current Outpatient Medications on File Prior to Visit   Medication Sig Dispense Refill   • albuterol (ACCUNEB) 1.25 MG/3ML nebulizer solution Take 1 ampule by nebulization Every 6 (Six) Hours As Needed.     • buPROPion XL (Wellbutrin XL) 150 MG 24 hr tablet Take 1 tablet by mouth Every Morning. 90 tablet 0   • Fluticasone Furoate-Vilanterol (Breo Ellipta) 100-25 MCG/INH inhaler      • hydrOXYzine (ATARAX) 25 MG tablet TAKE 1 TABLET BY MOUTH THREE TIMES A DAY AS NEEDED FOR ANXIETY 90 tablet 0   • montelukast (SINGULAIR) 10 MG tablet      • naproxen (NAPROSYN) 500 MG tablet Take 1 tablet by mouth 2 (Two) Times a Day With Meals. (Patient taking differently: Take 500 mg by mouth As Needed.) 30 tablet 5   • norethindrone-ethinyl estradiol FE (Junel FE 1/20) 1-20 MG-MCG per tablet Take 1 tablet by mouth Daily for 112 days. Skip placebo week. 28 tablet 12   • propranolol (INDERAL) 10 MG tablet TAKE 1 TABLET BY MOUTH 2 (TWO) TIMES A DAY AS NEEDED (ANXIETY). 60 tablet 0   • Sodium Fluoride 5000 Sensitive 1.1-5 %  "gel BRUSH WITH EVERY NIGHT. SPIT OUT DO NOT RINSE     • SUMAtriptan (IMITREX) 50 MG tablet Take one tablet at onset of headache. May repeat dose one time in 2 hours if headache not relieved. 12 tablet 2   • venlafaxine XR (EFFEXOR-XR) 150 MG 24 hr capsule Take 1 capsule by mouth Daily. 90 capsule 0   • [DISCONTINUED] fluticasone (VERAMYST) 27.5 MCG/SPRAY nasal spray 2 sprays into the nostril(s) as directed by provider Daily.       No current facility-administered medications on file prior to visit.       Allergies  No Known Allergies     Objective  Visit Vitals  /88   Pulse 89   Temp 98.3 °F (36.8 °C)   Ht 170.2 cm (67\")   Wt (!) 138 kg (304 lb)   SpO2 98%   BMI 47.61 kg/m²        Physical Exam  Physical Exam  Vitals and nursing note reviewed.   Constitutional:       General: She is not in acute distress.     Appearance: She is well-developed. She is obese. She is not ill-appearing, toxic-appearing or diaphoretic.   HENT:      Head: Normocephalic and atraumatic.      Right Ear: Tympanic membrane, ear canal and external ear normal.      Left Ear: Tympanic membrane, ear canal and external ear normal.      Nose: Nose normal.   Eyes:      General: No scleral icterus.     Conjunctiva/sclera: Conjunctivae normal.   Cardiovascular:      Rate and Rhythm: Normal rate and regular rhythm.      Heart sounds: Normal heart sounds. No murmur heard.  Pulmonary:      Effort: Pulmonary effort is normal. No respiratory distress.      Breath sounds: Normal breath sounds.   Abdominal:      General: Bowel sounds are normal. There is no distension.      Palpations: Abdomen is soft. There is no mass.      Tenderness: There is no abdominal tenderness.   Musculoskeletal:         General: No deformity.      Cervical back: Neck supple.      Right lower leg: No edema.      Left lower leg: No edema.   Lymphadenopathy:      Cervical: No cervical adenopathy.   Skin:     General: Skin is warm and dry.      Findings: No rash.   Neurological: "      Mental Status: She is alert and oriented to person, place, and time. Mental status is at baseline.      Gait: Gait normal.   Psychiatric:         Mood and Affect: Mood normal.         Behavior: Behavior normal.         Thought Content: Thought content normal.         Judgment: Judgment normal.          Results  Results for orders placed or performed in visit on 22   LIQUID-BASED PAP SMEAR, [facility] ([facility])    Specimen: Cervix; ThinPrep Vial   Result Value Ref Range    Reference Lab Report       [facility]  [address] [place] [address]  Phone: [phone] or [phone]  Fax: [phone]  Brian Reveles M.D., Medical Director    PATIENT NAME                                     LABORATORY NO.  [id number]   YESENIA REYNA                              [id number]  [id number]                                 AGE                    SEX   SSN              CLIENT REF #  [facility] RENAY VICK                  2000      F     [id number]      [id number]    [address]            REQUESTING M.D.           ATTENDING [place] [address]                        RENAY VICK  DATE COLLECTED            DATE RECEIVED          DATE REPORTED  2022    ThinPrep Pap with [facility]    DIAGNOSIS:  Negative for intraepithelial lesion or malignancy    Multiple factors can influence accuracy of Pap tests; therefore, screening at  regular  intervals is necessary for early cancer detection.      SPECIMEN ADEQUACY:  SATISFACTORY FOR EVALUATION  Transformation zone is present.  SOURCE OF SPECIMEN:       CERVICAL  SLIDES:  1  CLINICAL HISTORY:  A Routine  Birth control pill  ASCUS on 2019  ASCUS of cervix with negative high risk HPV      CYTOTECHNOLOGIST:             TAPAN JAMES (ASCP)    CPT CODES:  58380          Assessment and Plan  Diagnoses and all orders for this  visit:    Annual physical exam  - Counseling was given to patient for the following topics:  appropriate exercise, disease prevention and importance of immunizations, including risks and benefits. Also discussed the importance of regular dental and vision care  -     Hemoglobin A1c; Future    Chronic migraine without aura without status migrainosus, not intractable  - Controlled with stable migraine frequency, 1-2 per month. Continue as needed naproxen and imitrex 50mg.     Anxiety and depression  - Seeing psychiatry, mood stable on effexor 150mg daily and wellbutrin 150mg daily.    Allergic rhinitis due to animal hair and dander  - Following with allergist and notes good control with flonase, singulair.    Hypercholesterolemia  - Would benefit from more exercise and weight loss  - update lipid panel    Mild persistent asthma without complication  - Well controlled on Breo, albuterol PRN.    Class 3 severe obesity due to excess calories without serious comorbidity with body mass index (BMI) of 45.0 to 49.9 in adult (HCC)  - Class 3 Severe Obesity (BMI >=40). Obesity-related health conditions include the following: dyslipidemias. Obesity is unchanged. BMI is is above average; BMI management plan is completed. We discussed increasing exercise.    Chronic fatigue  - x6m, no localizing symptoms and denies depressed mood today  - Age appropriate cancer screen up to date  - Update labs today including CBC, CMP, TSH, B12 and D  - Discussed sleep study if labs are unremarkable  - Increase exercise to at least 30 minutes per day    Elevated BP without diagnosis of hypertension  - Mildly elevated today, recheck next visit    Health Maintenance   Topic Date Due   • Pneumococcal Vaccine 0-64 (1 - PCV) Never done   • HPV VACCINES (1 - 2-dose series) Never done   • TDAP/TD VACCINES (1 - Tdap) Never done   • CHLAMYDIA SCREENING  Never done   • ANNUAL PHYSICAL  07/20/2022   • LIPID PANEL  08/05/2022   • INFLUENZA VACCINE   10/01/2022   • PAP SMEAR  06/07/2025   • HEPATITIS C SCREENING  Completed   • COVID-19 Vaccine  Completed     Health Maintenance  - Pap smear: 6/2022 negative. Hx of ASCUS. GYN High.  - Mammogram: Start screening at age 40.  - Colonoscopy: Start screening at age 45.  - HCV: negative  - Immunizations: COVID UTD. Tdap UTD per patient.  - Depression screening: PHQ9 = 10 6/2022    Return in about 3 months (around 10/21/2022) for Follow up fatigue, 1 year for annual, Labs today.

## 2022-07-22 ENCOUNTER — LAB (OUTPATIENT)
Dept: LAB | Facility: HOSPITAL | Age: 22
End: 2022-07-22

## 2022-07-22 DIAGNOSIS — E78.00 HYPERCHOLESTEROLEMIA: ICD-10-CM

## 2022-07-22 DIAGNOSIS — Z00.00 ANNUAL PHYSICAL EXAM: ICD-10-CM

## 2022-07-22 DIAGNOSIS — R53.82 CHRONIC FATIGUE: ICD-10-CM

## 2022-07-22 LAB
25(OH)D3 SERPL-MCNC: 16.1 NG/ML (ref 30–100)
ALBUMIN SERPL-MCNC: 4 G/DL (ref 3.5–5.2)
ALBUMIN/GLOB SERPL: 1.1 G/DL
ALP SERPL-CCNC: 68 U/L (ref 39–117)
ALT SERPL W P-5'-P-CCNC: 12 U/L (ref 1–33)
ANION GAP SERPL CALCULATED.3IONS-SCNC: 11.4 MMOL/L (ref 5–15)
AST SERPL-CCNC: 21 U/L (ref 1–32)
BILIRUB SERPL-MCNC: 0.4 MG/DL (ref 0–1.2)
BUN SERPL-MCNC: 12 MG/DL (ref 6–20)
BUN/CREAT SERPL: 13.6 (ref 7–25)
CALCIUM SPEC-SCNC: 9.2 MG/DL (ref 8.6–10.5)
CHLORIDE SERPL-SCNC: 104 MMOL/L (ref 98–107)
CHOLEST SERPL-MCNC: 198 MG/DL (ref 0–200)
CO2 SERPL-SCNC: 23.6 MMOL/L (ref 22–29)
CREAT SERPL-MCNC: 0.88 MG/DL (ref 0.57–1)
DEPRECATED RDW RBC AUTO: 37.2 FL (ref 37–54)
EGFRCR SERPLBLD CKD-EPI 2021: 94.2 ML/MIN/1.73
ERYTHROCYTE [DISTWIDTH] IN BLOOD BY AUTOMATED COUNT: 11.7 % (ref 12.3–15.4)
GLOBULIN UR ELPH-MCNC: 3.5 GM/DL
GLUCOSE SERPL-MCNC: 109 MG/DL (ref 65–99)
HBA1C MFR BLD: 5.2 % (ref 4.8–5.6)
HCT VFR BLD AUTO: 41.7 % (ref 34–46.6)
HDLC SERPL-MCNC: 57 MG/DL (ref 40–60)
HGB BLD-MCNC: 13.9 G/DL (ref 12–15.9)
LDLC SERPL CALC-MCNC: 122 MG/DL (ref 0–100)
LDLC/HDLC SERPL: 2.11 {RATIO}
MCH RBC QN AUTO: 29 PG (ref 26.6–33)
MCHC RBC AUTO-ENTMCNC: 33.3 G/DL (ref 31.5–35.7)
MCV RBC AUTO: 86.9 FL (ref 79–97)
PLATELET # BLD AUTO: 338 10*3/MM3 (ref 140–450)
PMV BLD AUTO: 9.7 FL (ref 6–12)
POTASSIUM SERPL-SCNC: 4.1 MMOL/L (ref 3.5–5.2)
PROT SERPL-MCNC: 7.5 G/DL (ref 6–8.5)
RBC # BLD AUTO: 4.8 10*6/MM3 (ref 3.77–5.28)
SODIUM SERPL-SCNC: 139 MMOL/L (ref 136–145)
TRIGL SERPL-MCNC: 104 MG/DL (ref 0–150)
TSH SERPL DL<=0.05 MIU/L-ACNC: 1.81 UIU/ML (ref 0.27–4.2)
VIT B12 BLD-MCNC: 549 PG/ML (ref 211–946)
VLDLC SERPL-MCNC: 19 MG/DL (ref 5–40)
WBC NRBC COR # BLD: 6.59 10*3/MM3 (ref 3.4–10.8)

## 2022-07-22 PROCEDURE — 84443 ASSAY THYROID STIM HORMONE: CPT

## 2022-07-22 PROCEDURE — 82607 VITAMIN B-12: CPT

## 2022-07-22 PROCEDURE — 80053 COMPREHEN METABOLIC PANEL: CPT

## 2022-07-22 PROCEDURE — 80061 LIPID PANEL: CPT

## 2022-07-22 PROCEDURE — 83036 HEMOGLOBIN GLYCOSYLATED A1C: CPT

## 2022-07-22 PROCEDURE — 85027 COMPLETE CBC AUTOMATED: CPT

## 2022-07-22 PROCEDURE — 82306 VITAMIN D 25 HYDROXY: CPT

## 2022-07-24 DIAGNOSIS — E55.9 VITAMIN D DEFICIENCY: Primary | ICD-10-CM

## 2022-07-24 RX ORDER — ERGOCALCIFEROL 1.25 MG/1
50000 CAPSULE ORAL WEEKLY
Qty: 12 CAPSULE | Refills: 0 | Status: SHIPPED | OUTPATIENT
Start: 2022-07-24 | End: 2022-11-15

## 2022-08-22 ENCOUNTER — OFFICE VISIT (OUTPATIENT)
Dept: OBSTETRICS AND GYNECOLOGY | Facility: CLINIC | Age: 22
End: 2022-08-22

## 2022-08-22 VITALS
HEIGHT: 67 IN | DIASTOLIC BLOOD PRESSURE: 78 MMHG | WEIGHT: 293 LBS | BODY MASS INDEX: 45.99 KG/M2 | RESPIRATION RATE: 14 BRPM | SYSTOLIC BLOOD PRESSURE: 128 MMHG

## 2022-08-22 DIAGNOSIS — L73.9 FOLLICULITIS: Primary | ICD-10-CM

## 2022-08-22 PROCEDURE — 99212 OFFICE O/P EST SF 10 MIN: CPT | Performed by: STUDENT IN AN ORGANIZED HEALTH CARE EDUCATION/TRAINING PROGRAM

## 2022-08-22 RX ORDER — FLUCONAZOLE 150 MG/1
150 TABLET ORAL ONCE
Qty: 1 TABLET | Refills: 0 | Status: SHIPPED | OUTPATIENT
Start: 2022-08-22 | End: 2022-08-22

## 2022-08-22 RX ORDER — SULFAMETHOXAZOLE AND TRIMETHOPRIM 800; 160 MG/1; MG/1
1 TABLET ORAL 2 TIMES DAILY
Qty: 14 TABLET | Refills: 0 | Status: SHIPPED | OUTPATIENT
Start: 2022-08-22 | End: 2022-08-29

## 2022-08-22 NOTE — PROGRESS NOTES
"Subjective   Chief Complaint   Patient presents with   • Follow-up     C/o raised lump under right breast warm to the touch, and tender.     Deysi Ordoñez is a 22 y.o. year old .  No LMP recorded (lmp unknown). (Menstrual status: Oral contraceptives).  She reports a lesion on her right breast that started on Thursday night. It was swollen, red, tender to the touch and about 2 inches wide. She initially had drainage and blood when it \"popped\" on Thursday night and feels it has gotten smaller since then. Denies fevers, chills, or continued drainage from the site. This has never happened before.     The following portions of the patient's history were reviewed and updated as appropriate:current medications, allergies and past medical history    Social History    Tobacco Use      Smoking status: Never Smoker      Smokeless tobacco: Never Used         Objective   /78 (BP Location: Left arm, Patient Position: Sitting, Cuff Size: Large Adult)   Resp 14   Ht 170.2 cm (67\")   Wt (!) 139 kg (305 lb 11.2 oz)   LMP  (LMP Unknown) Comment: OCP continuous  Breastfeeding No   BMI 47.88 kg/m²     General:  well developed; well nourished  no acute distress   Lungs:  breathing is unlabored   Heart:  Not performed.   Breasts:  Examined in supine position  Symmetric without masses or skin dimpling  Nipples normal without inversion, lesions or discharge  There are no palpable axillary nodes  Small, healing superficial, 3cm follicultis with minimal erythema on the right lower, inner quadrant; no drainage or bleeding appreciated      Lab Review   No data reviewed    Imaging   No data reviewed        Assessment   1. Breast folliculitis, healing      Plan   1. Well healing, superficial folliculitis of the right breast. Due to risk of MRSA, will prescribe 1 week of BID Bactrim with 1 time Diflucan if needed. Reassurance provided.   2. The importance of keeping all planned follow-up and taking all medications as " prescribed was emphasized.  3. Follow up for scheduled annul exam 6/2023, or sooner PRN    New Medications Ordered This Visit   Medications   • sulfamethoxazole-trimethoprim (Bactrim DS) 800-160 MG per tablet     Sig: Take 1 tablet by mouth 2 (Two) Times a Day for 7 days.     Dispense:  14 tablet     Refill:  0   • fluconazole (Diflucan) 150 MG tablet     Sig: Take 1 tablet by mouth 1 (One) Time for 1 dose.     Dispense:  1 tablet     Refill:  0          This note was electronically signed.    Velia Persaud MD   August 22, 2022

## 2022-09-30 ENCOUNTER — TELEPHONE (OUTPATIENT)
Dept: INTERNAL MEDICINE | Facility: CLINIC | Age: 22
End: 2022-09-30

## 2022-09-30 NOTE — TELEPHONE ENCOUNTER
CALLING PATIENT TO RESCHEDULE THE FOLLOWING APPOINTMENT WITH DR. MCCORMICK:  11/15/2022 AT 08:00AM    LVM FOR PATIENT.    RESCHEDULED PATIENT FOR DR. MCCORMICK NEXT AVAILABLE APPOINTMENT ON 11/15/2022 AT 8:45AM.    PLEASE RESCHEDULE IF NEEDED.    THANKS

## 2022-10-12 DIAGNOSIS — F33.0 MILD EPISODE OF RECURRENT MAJOR DEPRESSIVE DISORDER: Chronic | ICD-10-CM

## 2022-10-12 DIAGNOSIS — F41.1 GENERALIZED ANXIETY DISORDER: Chronic | ICD-10-CM

## 2022-10-12 DIAGNOSIS — F40.10 SOCIAL ANXIETY DISORDER: ICD-10-CM

## 2022-10-12 RX ORDER — VENLAFAXINE HYDROCHLORIDE 150 MG/1
CAPSULE, EXTENDED RELEASE ORAL
Qty: 30 CAPSULE | Refills: 2 | Status: SHIPPED | OUTPATIENT
Start: 2022-10-12

## 2022-10-12 RX ORDER — BUPROPION HYDROCHLORIDE 150 MG/1
TABLET ORAL
Qty: 90 TABLET | Refills: 0 | Status: SHIPPED | OUTPATIENT
Start: 2022-10-12

## 2022-11-15 ENCOUNTER — LAB (OUTPATIENT)
Dept: LAB | Facility: HOSPITAL | Age: 22
End: 2022-11-15

## 2022-11-15 ENCOUNTER — OFFICE VISIT (OUTPATIENT)
Dept: INTERNAL MEDICINE | Facility: CLINIC | Age: 22
End: 2022-11-15

## 2022-11-15 VITALS
TEMPERATURE: 97.4 F | OXYGEN SATURATION: 99 % | SYSTOLIC BLOOD PRESSURE: 128 MMHG | HEART RATE: 87 BPM | DIASTOLIC BLOOD PRESSURE: 84 MMHG | WEIGHT: 293 LBS | BODY MASS INDEX: 45.99 KG/M2 | HEIGHT: 67 IN

## 2022-11-15 DIAGNOSIS — E55.9 VITAMIN D DEFICIENCY: Primary | ICD-10-CM

## 2022-11-15 DIAGNOSIS — R53.82 CHRONIC FATIGUE: ICD-10-CM

## 2022-11-15 DIAGNOSIS — E55.9 VITAMIN D DEFICIENCY: ICD-10-CM

## 2022-11-15 LAB — 25(OH)D3 SERPL-MCNC: 26.8 NG/ML (ref 30–100)

## 2022-11-15 PROCEDURE — 82306 VITAMIN D 25 HYDROXY: CPT

## 2022-11-15 PROCEDURE — 99213 OFFICE O/P EST LOW 20 MIN: CPT | Performed by: INTERNAL MEDICINE

## 2022-11-15 RX ORDER — MELATONIN
2000 DAILY
COMMUNITY

## 2022-11-15 NOTE — PROGRESS NOTES
Internal Medicine Follow Up    Chief Complaint  Deysi Ordoñez is a 22 y.o. female who presents today for follow up of chronic medical conditions outlined below.    Chief Complaint   Patient presents with   • Fatigue   • Vitamin D Deficiency        HPI  Ms. Ordoñez comes in today for follow up on fatigue and vitamin D deficiency. Vitamin D deficiency noted on lab workup for fatigue. She was started on weekly ergocalciferol x3m which she finished about 2 weeks ago. She noted an improvement in fatigue and also noted more fatigue once off vitamin D so she started an OTC D3 supplement of 2000IU daily. She feels better on supplementation. She has no new concerns and denies other medication changes.       Review of Systems  Review of Systems   Constitutional: Negative.    Psychiatric/Behavioral: Negative.         Current Medications  Current Outpatient Medications on File Prior to Visit   Medication Sig Dispense Refill   • albuterol (ACCUNEB) 1.25 MG/3ML nebulizer solution Take 1 ampule by nebulization Every 6 (Six) Hours As Needed.     • buPROPion XL (WELLBUTRIN XL) 150 MG 24 hr tablet TAKE 1 TABLET BY MOUTH EVERY DAY IN THE MORNING 90 tablet 0   • Fluticasone Furoate-Vilanterol (Breo Ellipta) 100-25 MCG/INH inhaler      • hydrOXYzine (ATARAX) 25 MG tablet TAKE 1 TABLET BY MOUTH THREE TIMES A DAY AS NEEDED FOR ANXIETY 90 tablet 0   • montelukast (SINGULAIR) 10 MG tablet      • naproxen (NAPROSYN) 500 MG tablet Take 1 tablet by mouth 2 (Two) Times a Day As Needed for Headache. 30 tablet 5   • propranolol (INDERAL) 10 MG tablet TAKE 1 TABLET BY MOUTH 2 (TWO) TIMES A DAY AS NEEDED (ANXIETY). 60 tablet 0   • Sodium Fluoride 5000 Sensitive 1.1-5 % gel BRUSH WITH EVERY NIGHT. SPIT OUT DO NOT RINSE     • SUMAtriptan (IMITREX) 50 MG tablet Take one tablet at onset of headache. May repeat dose one time in 2 hours if headache not relieved. 12 tablet 2   • venlafaxine XR (EFFEXOR-XR) 150 MG 24 hr capsule TAKE 1 CAPSULE BY MOUTH  "EVERY DAY 30 capsule 2   • vitamin D (ERGOCALCIFEROL) 1.25 MG (85791 UT) capsule capsule Take 1 capsule by mouth 1 (One) Time Per Week. 12 capsule 0   • norethindrone-ethinyl estradiol FE (Junel FE 1/20) 1-20 MG-MCG per tablet Take 1 tablet by mouth Daily for 112 days. Skip placebo week. 28 tablet 12     No current facility-administered medications on file prior to visit.       Allergies  No Known Allergies    Objective  Visit Vitals  /84   Pulse 87   Temp 97.4 °F (36.3 °C)   Ht 170.2 cm (67\")   Wt (!) 140 kg (309 lb)   SpO2 99%   BMI 48.40 kg/m²        Physical Exam  Physical Exam  Vitals and nursing note reviewed.   Constitutional:       General: She is not in acute distress.     Appearance: She is well-developed. She is not ill-appearing or toxic-appearing.   HENT:      Head: Normocephalic and atraumatic.   Eyes:      Conjunctiva/sclera: Conjunctivae normal.   Pulmonary:      Effort: Pulmonary effort is normal. No respiratory distress.   Skin:     General: Skin is warm and dry.   Neurological:      Mental Status: She is alert and oriented to person, place, and time. Mental status is at baseline.   Psychiatric:         Mood and Affect: Mood normal.         Behavior: Behavior normal.         Results  Results for orders placed or performed in visit on 07/22/22   CBC (No Diff)    Specimen: Blood   Result Value Ref Range    WBC 6.59 3.40 - 10.80 10*3/mm3    RBC 4.80 3.77 - 5.28 10*6/mm3    Hemoglobin 13.9 12.0 - 15.9 g/dL    Hematocrit 41.7 34.0 - 46.6 %    MCV 86.9 79.0 - 97.0 fL    MCH 29.0 26.6 - 33.0 pg    MCHC 33.3 31.5 - 35.7 g/dL    RDW 11.7 (L) 12.3 - 15.4 %    RDW-SD 37.2 37.0 - 54.0 fl    MPV 9.7 6.0 - 12.0 fL    Platelets 338 140 - 450 10*3/mm3   Comprehensive Metabolic Panel    Specimen: Blood   Result Value Ref Range    Glucose 109 (H) 65 - 99 mg/dL    BUN 12 6 - 20 mg/dL    Creatinine 0.88 0.57 - 1.00 mg/dL    Sodium 139 136 - 145 mmol/L    Potassium 4.1 3.5 - 5.2 mmol/L    Chloride 104 98 - 107 " mmol/L    CO2 23.6 22.0 - 29.0 mmol/L    Calcium 9.2 8.6 - 10.5 mg/dL    Total Protein 7.5 6.0 - 8.5 g/dL    Albumin 4.00 3.50 - 5.20 g/dL    ALT (SGPT) 12 1 - 33 U/L    AST (SGOT) 21 1 - 32 U/L    Alkaline Phosphatase 68 39 - 117 U/L    Total Bilirubin 0.4 0.0 - 1.2 mg/dL    Globulin 3.5 gm/dL    A/G Ratio 1.1 g/dL    BUN/Creatinine Ratio 13.6 7.0 - 25.0    Anion Gap 11.4 5.0 - 15.0 mmol/L    eGFR 94.2 >60.0 mL/min/1.73   TSH Rfx On Abnormal To Free T4    Specimen: Blood   Result Value Ref Range    TSH 1.810 0.270 - 4.200 uIU/mL   Lipid Panel    Specimen: Blood   Result Value Ref Range    Total Cholesterol 198 0 - 200 mg/dL    Triglycerides 104 0 - 150 mg/dL    HDL Cholesterol 57 40 - 60 mg/dL    LDL Cholesterol  122 (H) 0 - 100 mg/dL    VLDL Cholesterol 19 5 - 40 mg/dL    LDL/HDL Ratio 2.11    Vitamin D 25 Hydroxy    Specimen: Blood   Result Value Ref Range    25 Hydroxy, Vitamin D 16.1 (L) 30.0 - 100.0 ng/ml   Vitamin B12    Specimen: Blood   Result Value Ref Range    Vitamin B-12 549 211 - 946 pg/mL   Hemoglobin A1c    Specimen: Blood   Result Value Ref Range    Hemoglobin A1C 5.20 4.80 - 5.60 %        Assessment and Plan  Diagnoses and all orders for this visit:    Vitamin D deficiency  - s/p ergocalciferol to treat vitamin D level of 16 and now on D3 2000IU daily  - will recheck level    Chronic fatigue  - work up revealed vitamin D level of 16 and she has been supplementing as noted above with improvement.     Health Maintenance  - Pap smear: 6/2022 negative. Hx of ASCUS. GYN High.  - Mammogram: Start screening at age 40.  - Colonoscopy: Start screening at age 43.  - HCV: negative  - Immunizations: COVID UTD. Flu UTD. Tdap 2016 per patient. HPV and PCV20 discussed.  - Depression screening: PHQ9 = 10 6/2022    Return for Next scheduled follow up, Labs today.

## 2023-04-13 RX ORDER — NORETHINDRONE ACETATE AND ETHINYL ESTRADIOL AND FERROUS FUMARATE 1MG-20(21)
KIT ORAL
Qty: 112 TABLET | Refills: 0 | Status: SHIPPED | OUTPATIENT
Start: 2023-04-13

## 2023-06-07 ENCOUNTER — OFFICE VISIT (OUTPATIENT)
Dept: OBSTETRICS AND GYNECOLOGY | Facility: CLINIC | Age: 23
End: 2023-06-07
Payer: COMMERCIAL

## 2023-06-07 VITALS
HEIGHT: 67 IN | SYSTOLIC BLOOD PRESSURE: 126 MMHG | WEIGHT: 293 LBS | DIASTOLIC BLOOD PRESSURE: 84 MMHG | RESPIRATION RATE: 14 BRPM | BODY MASS INDEX: 45.99 KG/M2

## 2023-06-07 DIAGNOSIS — Z01.419 WOMEN'S ANNUAL ROUTINE GYNECOLOGICAL EXAMINATION: Primary | ICD-10-CM

## 2023-06-07 DIAGNOSIS — Z30.40 ENCOUNTER FOR REFILL OF PRESCRIPTION FOR CONTRACEPTION: ICD-10-CM

## 2023-06-07 RX ORDER — NORETHINDRONE ACETATE AND ETHINYL ESTRADIOL 1MG-20(21)
1 KIT ORAL DAILY
Qty: 90 TABLET | Refills: 3 | Status: SHIPPED | OUTPATIENT
Start: 2023-06-07

## 2023-06-07 NOTE — PROGRESS NOTES
"Subjective   Chief Complaint   Patient presents with    Annual Exam     No complaints     Deysi Ordoñez is a 23 y.o. year old  presenting to be seen for her annual exam. She is doing well and has no complaints. 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 She graduates grad school in !     Last pap 2022: NILM  HPV vaccine series: completed     SEXUAL Hx:  She has never been sexually active.   Current birth control method: OCP (estrogen/progesterone)/abstinence  She is happy with her current method of contraception and does not want to discuss alternative methods of contraception.  MENSTRUAL Hx:  Patient's last menstrual period was 2023 (exact date).  In the past 6 months her cycles have been infrequent.  Her menstrual flow is typically light.    Intermenstrual bleeding is absent.    Post-coital bleeding is n/a.  Dysmenorrhea: none  PMS: none  Her cycles are not a source of concern for her that she wishes to discuss today.  HEALTH Hx:  She exercises regularly: yes. Ride stationary bike, and lift weights 3x week!   She wears her seat belt: yes.  She has concerns about domestic violence: no.  OTHER THINGS SHE WANTS TO DISCUSS TODAY:  Nothing else    The following portions of the patient's history were reviewed and updated as appropriate:problem list, current medications, allergies, past family history, past medical history, past social history, and past surgical history.    Social History    Tobacco Use      Smoking status: Never      Smokeless tobacco: Never         Objective   /84 (BP Location: Right arm, Patient Position: Sitting, Cuff Size: Large Adult)   Resp 14   Ht 170.2 cm (67\")   Wt (!) 142 kg (313 lb 12.8 oz)   LMP 2023 (Exact Date)   Breastfeeding No   BMI 49.15 kg/m²     General:  well developed; well nourished  no acute distress  obese - Body mass index is 49.15 kg/m².   Skin:  No suspicious lesions seen   Thyroid: not examined   Breasts:  Examined in " supine position  Symmetric without masses or skin dimpling  Nipples normal without inversion, lesions or discharge  There are no palpable axillary nodes   Pelvis: Exam limited by  body habitus  Clinical staff was present for exam  External genitalia:  normal appearance of the external genitalia including Bartholin's and St. Leo's glands.  :  urethral meatus normal;  Vaginal:  normal pink mucosa without prolapse or lesions.  Cervix:  normal appearance.  Uterus:  normal size, shape and consistency.  Adnexa:  normal bimanual exam of the adnexa.  Rectal:  digital rectal exam not performed; anus visually normal appearing.        Assessment   Normal GYN exam  Contraception refill      Plan   Pap was not done today.  I explained to Deysi that the recommendations for Pap smear interval in a low risk patient has lengthened to 3 years time.  I told Deysi she still needs to be seen in our office yearly for a full physical including breast and pelvic exam.  Continue Junel 1/20 daily for period control. 90 day supply with 1 year refills sent to patient listed pharmacy.   The importance of keeping all planned follow-up and taking all medications as prescribed was emphasized.  Follow up for annual exam in 1 year or sooner PRN     New Medications Ordered This Visit   Medications    norethindrone-ethinyl estradiol FE (Junel FE 1/20) 1-20 MG-MCG per tablet     Sig: Take 1 tablet by mouth Daily.     Dispense:  90 tablet     Refill:  3          This note was electronically signed.    Velia Persaud MD   June 7, 2023

## 2023-07-25 ENCOUNTER — OFFICE VISIT (OUTPATIENT)
Dept: INTERNAL MEDICINE | Facility: CLINIC | Age: 23
End: 2023-07-25
Payer: COMMERCIAL

## 2023-07-25 ENCOUNTER — LAB (OUTPATIENT)
Dept: LAB | Facility: HOSPITAL | Age: 23
End: 2023-07-25
Payer: COMMERCIAL

## 2023-07-25 VITALS
WEIGHT: 293 LBS | HEART RATE: 95 BPM | BODY MASS INDEX: 45.99 KG/M2 | HEIGHT: 67 IN | OXYGEN SATURATION: 98 % | TEMPERATURE: 97.5 F | DIASTOLIC BLOOD PRESSURE: 82 MMHG | SYSTOLIC BLOOD PRESSURE: 132 MMHG

## 2023-07-25 DIAGNOSIS — F33.0 MILD EPISODE OF RECURRENT MAJOR DEPRESSIVE DISORDER: Chronic | ICD-10-CM

## 2023-07-25 DIAGNOSIS — J45.30 MILD PERSISTENT ASTHMA WITHOUT COMPLICATION: ICD-10-CM

## 2023-07-25 DIAGNOSIS — Z23 NEED FOR PNEUMOCOCCAL 20-VALENT CONJUGATE VACCINATION: ICD-10-CM

## 2023-07-25 DIAGNOSIS — E55.9 VITAMIN D DEFICIENCY: ICD-10-CM

## 2023-07-25 DIAGNOSIS — F41.1 GENERALIZED ANXIETY DISORDER: Chronic | ICD-10-CM

## 2023-07-25 DIAGNOSIS — F40.10 SOCIAL ANXIETY DISORDER: ICD-10-CM

## 2023-07-25 DIAGNOSIS — E78.00 HYPERCHOLESTEROLEMIA: ICD-10-CM

## 2023-07-25 DIAGNOSIS — G43.709 CHRONIC MIGRAINE WITHOUT AURA WITHOUT STATUS MIGRAINOSUS, NOT INTRACTABLE: ICD-10-CM

## 2023-07-25 DIAGNOSIS — Z00.00 ANNUAL PHYSICAL EXAM: Primary | ICD-10-CM

## 2023-07-25 DIAGNOSIS — J30.81 ALLERGIC RHINITIS DUE TO ANIMAL HAIR AND DANDER: ICD-10-CM

## 2023-07-25 DIAGNOSIS — Z00.00 ANNUAL PHYSICAL EXAM: ICD-10-CM

## 2023-07-25 LAB — HBA1C MFR BLD: 5.1 % (ref 4.8–5.6)

## 2023-07-25 PROCEDURE — 99395 PREV VISIT EST AGE 18-39: CPT | Performed by: INTERNAL MEDICINE

## 2023-07-25 PROCEDURE — 90471 IMMUNIZATION ADMIN: CPT | Performed by: INTERNAL MEDICINE

## 2023-07-25 PROCEDURE — 80061 LIPID PANEL: CPT

## 2023-07-25 PROCEDURE — 99214 OFFICE O/P EST MOD 30 MIN: CPT | Performed by: INTERNAL MEDICINE

## 2023-07-25 PROCEDURE — 83036 HEMOGLOBIN GLYCOSYLATED A1C: CPT

## 2023-07-25 PROCEDURE — 82306 VITAMIN D 25 HYDROXY: CPT

## 2023-07-25 PROCEDURE — 80053 COMPREHEN METABOLIC PANEL: CPT

## 2023-07-25 PROCEDURE — 90677 PCV20 VACCINE IM: CPT | Performed by: INTERNAL MEDICINE

## 2023-07-25 PROCEDURE — 85027 COMPLETE CBC AUTOMATED: CPT

## 2023-07-25 RX ORDER — BUPROPION HYDROCHLORIDE 150 MG/1
150 TABLET ORAL EVERY MORNING
Qty: 90 TABLET | Refills: 0 | Status: SHIPPED | OUTPATIENT
Start: 2023-07-25

## 2023-07-25 RX ORDER — ALBUTEROL SULFATE 90 UG/1
1-2 AEROSOL, METERED RESPIRATORY (INHALATION) SEE ADMIN INSTRUCTIONS
COMMUNITY
Start: 2023-07-06

## 2023-07-25 RX ORDER — VENLAFAXINE HYDROCHLORIDE 150 MG/1
150 CAPSULE, EXTENDED RELEASE ORAL DAILY
Qty: 30 CAPSULE | Refills: 2 | Status: CANCELLED | OUTPATIENT
Start: 2023-07-25

## 2023-07-25 RX ORDER — VENLAFAXINE HYDROCHLORIDE 75 MG/1
75 CAPSULE, EXTENDED RELEASE ORAL DAILY
Qty: 90 CAPSULE | Refills: 0 | Status: SHIPPED | OUTPATIENT
Start: 2023-07-25

## 2023-07-25 NOTE — PROGRESS NOTES
Internal Medicine Annual Exam  Deysi Ordoñez is a 23 y.o. female who presents today for an annual exam and with concerns as outlined below.    Chief Complaint  Chief Complaint   Patient presents with    Annual Exam    Anxiety    Hyperlipidemia        HPI  Ms. Ordoñez comes in for her physical. She continues to see her allergist but has stopped seeing her psychiatrist. Could not fit the monthly appointments into her schedule. 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 She is stressed, anxious and depressed since stopping wellbutrin and effexor. She denies SI but has had this in the past. She would like to resume her medications and have them managed in this office. She is up to date with her GYN exam. Vaccines are up to date with exception of PCV20. Denies use of tobacco, ecigarettes, illicit drugs, and drinks minimal alcohol.         Review of Systems  Review of Systems   Constitutional:  Positive for fatigue. Negative for appetite change.   Respiratory: Negative.     Cardiovascular: Negative.    Gastrointestinal: Negative.    Genitourinary: Negative.    Musculoskeletal: Negative.    Skin: Negative.    Allergic/Immunologic: Positive for environmental allergies.   Neurological: Negative.    Psychiatric/Behavioral:  Positive for depressed mood and stress. Negative for self-injury, sleep disturbance and suicidal ideas. The patient is nervous/anxious.       Past Medical History  Past Medical History:   Diagnosis Date    Allergic     Anxiety     Asthma     Depression     Diarrhea 2/11/2021    Migraines         Surgical History  Past Surgical History:   Procedure Laterality Date    ADENOIDECTOMY  05/2018    NASAL POLYP SURGERY  4/2009, 5/2018    NASAL SEPTUM SURGERY  4/2009, 5/2018    NASAL SINUS SURGERY  05/2018    TURBINATE RESECTION  05/2018    WISDOM TOOTH EXTRACTION  08/2011        Family History  Family History   Problem Relation Age of Onset     Thyroid disease Mother     Hypertension Maternal Grandmother     Arthritis Maternal Grandmother     Depression Maternal Grandmother     Hypertension Maternal Grandfather     Depression Maternal Grandfather     Depression Paternal Grandmother     Dementia Paternal Grandmother     Depression Paternal Grandfather     Breast cancer Maternal Aunt 60    Ovarian cancer Neg Hx     Uterine cancer Neg Hx     Endometrial cancer Neg Hx     Colon cancer Neg Hx         Social History  Social History     Socioeconomic History    Marital status: Single   Tobacco Use    Smoking status: Never    Smokeless tobacco: Never   Vaping Use    Vaping Use: Never used   Substance and Sexual Activity    Alcohol use: Yes     Alcohol/week: 2.0 standard drinks     Types: 2 Glasses of wine per week     Comment: once a week    Drug use: Never    Sexual activity: Never     Birth control/protection: OCP        Current Medications  Current Outpatient Medications on File Prior to Visit   Medication Sig Dispense Refill    albuterol (ACCUNEB) 1.25 MG/3ML nebulizer solution Take 3 mL by nebulization Every 6 (Six) Hours As Needed.      albuterol sulfate  (90 Base) MCG/ACT inhaler Inhale 1-2 puffs See Admin Instructions. Inhale 1-2 puffs every 4 to 6 hours as needed      buPROPion XL (WELLBUTRIN XL) 150 MG 24 hr tablet TAKE 1 TABLET BY MOUTH EVERY DAY IN THE MORNING 90 tablet 0    cholecalciferol (VITAMIN D3) 25 MCG (1000 UT) tablet Take 2 tablets by mouth Daily.      Fluticasone Furoate-Vilanterol (Breo Ellipta) 100-25 MCG/INH inhaler       hydrOXYzine (ATARAX) 25 MG tablet TAKE 1 TABLET BY MOUTH THREE TIMES A DAY AS NEEDED FOR ANXIETY 90 tablet 0    montelukast (SINGULAIR) 10 MG tablet       naproxen (NAPROSYN) 500 MG tablet Take 1 tablet by mouth 2 (Two) Times a Day As Needed for Headache. 30 tablet 5    norethindrone-ethinyl estradiol FE (Junel FE 1/20) 1-20 MG-MCG per tablet TAKE 1 TABLET BY MOUTH DAILY  DAYS. SKIP PLACEBO WEEK 112  "tablet 3    propranolol (INDERAL) 10 MG tablet TAKE 1 TABLET BY MOUTH 2 (TWO) TIMES A DAY AS NEEDED (ANXIETY). 60 tablet 0    Sodium Fluoride 5000 Sensitive 1.1-5 % gel BRUSH WITH EVERY NIGHT. SPIT OUT DO NOT RINSE      SUMAtriptan (IMITREX) 50 MG tablet Take one tablet at onset of headache. May repeat dose one time in 2 hours if headache not relieved. 12 tablet 2    venlafaxine XR (EFFEXOR-XR) 150 MG 24 hr capsule TAKE 1 CAPSULE BY MOUTH EVERY DAY 30 capsule 2    [DISCONTINUED] nystatin (MYCOSTATIN) 100,000 unit/mL suspension TAKE 2.5 ML ON EACH SIDE OF MOUTH (5 ML TOTAL) 4 TIMES A DAY FOR 7 DAYS USE FOR 48H AFTER RESOLVED       No current facility-administered medications on file prior to visit.       Allergies  No Known Allergies     Objective  Visit Vitals  /82   Pulse 95   Temp 97.5 °F (36.4 °C)   Ht 170.2 cm (67\")   Wt (!) 142 kg (314 lb)   LMP  (LMP Unknown)   SpO2 98%   BMI 49.18 kg/m²        Physical Exam  Physical Exam  Vitals and nursing note reviewed.   Constitutional:       General: She is not in acute distress.     Appearance: She is well-developed. She is obese. She is not ill-appearing, toxic-appearing or diaphoretic.   HENT:      Head: Normocephalic and atraumatic.      Right Ear: Ear canal and external ear normal.      Left Ear: Ear canal and external ear normal.      Ears:      Comments: Tympanosclerosis bilaterally     Nose: Nose normal.   Eyes:      General: No scleral icterus.     Conjunctiva/sclera: Conjunctivae normal.   Cardiovascular:      Rate and Rhythm: Normal rate and regular rhythm.      Heart sounds: Normal heart sounds. No murmur heard.  Pulmonary:      Effort: Pulmonary effort is normal. No respiratory distress.      Breath sounds: Normal breath sounds.   Abdominal:      General: There is no distension.      Palpations: Abdomen is soft. There is no mass.      Tenderness: There is no abdominal tenderness.   Musculoskeletal:         General: No deformity.      Cervical back: " Neck supple.      Right lower leg: No edema.      Left lower leg: No edema.   Skin:     General: Skin is warm and dry.      Findings: No rash.   Neurological:      Mental Status: She is alert and oriented to person, place, and time. Mental status is at baseline.      Gait: Gait normal.   Psychiatric:         Mood and Affect: Mood normal.         Behavior: Behavior normal.         Thought Content: Thought content normal.         Judgment: Judgment normal.        Results  Results for orders placed or performed in visit on 11/15/22   Vitamin D,25-Hydroxy    Specimen: Blood   Result Value Ref Range    25 Hydroxy, Vitamin D 26.8 (L) 30.0 - 100.0 ng/ml        Assessment and Plan  Diagnoses and all orders for this visit:    Annual physical exam  - Counseling was given to patient for the following topics:  importance of immunizations, including risks and benefits. Also discussed the importance of regular dental and vision care.  -     CBC (No Diff); Future  -     Comprehensive Metabolic Panel; Future  -     Hemoglobin A1c; Future    Mild episode of recurrent major depressive disorder  Generalized anxiety disorder  Social anxiety disorder  - uncontrolled since off medications, no SI  - unable to keep up with psychiatry appts  - will resume wellbturin 150mg daily and effexor 75mg daily. Previously on effexor 150mg daily and we discussed that she can increase dose to 150mg daily after 2 weeks if needed.  - follow up here in 4 weeks    Chronic migraine without aura without status migrainosus, not intractable  - minimal migraines, managed with NSAID and imitrex PRN    Allergic rhinitis due to animal hair and dander  - Following with allergist and notes good control with flonase, singulair.     Mild persistent asthma without complication  - Well controlled on Breo, albuterol PRN.     Hypercholesterolemia  - update lipid panel    Vitamin D deficiency  - supplementing with subsequent improvement  - check vit D level    Health  Maintenance   Topic Date Due    Pneumococcal Vaccine 0-64 (1 - PCV) Never done    HPV VACCINES (1 - 2-dose series) Never done    ANNUAL PHYSICAL  07/21/2023    LIPID PANEL  07/22/2023    TDAP/TD VACCINES (1 - Tdap) 01/01/2026 (Originally 4/21/2017)    INFLUENZA VACCINE  10/01/2023    PAP SMEAR  06/07/2025    HEPATITIS C SCREENING  Completed    COVID-19 Vaccine  Completed     Health Maintenance  - Pap smear: 6/2022 negative. Hx of ASCUS. GYN High.  - Mammogram: Start screening at age 40.  - Colonoscopy: Start screening at age 45.  - HCV: negative  - Immunizations: COVID UTD. Tdap 2016 per patient. HPV complete per patient. PCV20 today.  - Depression screening: negative 7/2023    Return in about 4 weeks (around 8/22/2023) for Follow up mood, 1 year for annual, Labs today.

## 2023-07-26 ENCOUNTER — TELEPHONE (OUTPATIENT)
Dept: INTERNAL MEDICINE | Facility: CLINIC | Age: 23
End: 2023-07-26
Payer: COMMERCIAL

## 2023-07-26 LAB
25(OH)D3 SERPL-MCNC: 25.1 NG/ML (ref 30–100)
ALBUMIN SERPL-MCNC: 4.4 G/DL (ref 3.5–5.2)
ALBUMIN/GLOB SERPL: 1.7 G/DL
ALP SERPL-CCNC: 57 U/L (ref 39–117)
ALT SERPL W P-5'-P-CCNC: 13 U/L (ref 1–33)
ANION GAP SERPL CALCULATED.3IONS-SCNC: 10 MMOL/L (ref 5–15)
AST SERPL-CCNC: 27 U/L (ref 1–32)
BILIRUB SERPL-MCNC: 0.3 MG/DL (ref 0–1.2)
BUN SERPL-MCNC: 11 MG/DL (ref 6–20)
BUN/CREAT SERPL: 12.9 (ref 7–25)
CALCIUM SPEC-SCNC: 9.2 MG/DL (ref 8.6–10.5)
CHLORIDE SERPL-SCNC: 108 MMOL/L (ref 98–107)
CHOLEST SERPL-MCNC: 204 MG/DL (ref 0–200)
CO2 SERPL-SCNC: 24 MMOL/L (ref 22–29)
CREAT SERPL-MCNC: 0.85 MG/DL (ref 0.57–1)
DEPRECATED RDW RBC AUTO: 37.5 FL (ref 37–54)
EGFRCR SERPLBLD CKD-EPI 2021: 97.6 ML/MIN/1.73
ERYTHROCYTE [DISTWIDTH] IN BLOOD BY AUTOMATED COUNT: 12 % (ref 12.3–15.4)
GLOBULIN UR ELPH-MCNC: 2.6 GM/DL
GLUCOSE SERPL-MCNC: 88 MG/DL (ref 65–99)
HCT VFR BLD AUTO: 40.4 % (ref 34–46.6)
HDLC SERPL-MCNC: 58 MG/DL (ref 40–60)
HGB BLD-MCNC: 13.9 G/DL (ref 12–15.9)
LDLC SERPL CALC-MCNC: 129 MG/DL (ref 0–100)
LDLC/HDLC SERPL: 2.19 {RATIO}
MCH RBC QN AUTO: 29.6 PG (ref 26.6–33)
MCHC RBC AUTO-ENTMCNC: 34.4 G/DL (ref 31.5–35.7)
MCV RBC AUTO: 86 FL (ref 79–97)
PLATELET # BLD AUTO: 336 10*3/MM3 (ref 140–450)
PMV BLD AUTO: 9.7 FL (ref 6–12)
POTASSIUM SERPL-SCNC: 4.3 MMOL/L (ref 3.5–5.2)
PROT SERPL-MCNC: 7 G/DL (ref 6–8.5)
RBC # BLD AUTO: 4.7 10*6/MM3 (ref 3.77–5.28)
SODIUM SERPL-SCNC: 142 MMOL/L (ref 136–145)
TRIGL SERPL-MCNC: 94 MG/DL (ref 0–150)
VLDLC SERPL-MCNC: 17 MG/DL (ref 5–40)
WBC NRBC COR # BLD: 5.63 10*3/MM3 (ref 3.4–10.8)

## 2023-07-26 NOTE — TELEPHONE ENCOUNTER
Fax received from pharmacy stating pts insurance will only cover the higher dose of venlafaxine. They would like to know of any alternative options.

## 2023-07-27 NOTE — TELEPHONE ENCOUNTER
Called and spoke to pharmacist and he claims they will not cover more than one capsule a day no matter the dosage. He advised we can do the 75mg for two weeks and 1 capsule daily and then send in a new Rx for the 150mg once daily in 2 weeks.  Verbal given to fill the 75mg for two weeks to get pt started.

## 2023-07-27 NOTE — TELEPHONE ENCOUNTER
That is perfect. Please update the patient and have her call us in 2 weeks to let us know if we should send in higher dose at that time. Thanks.

## 2023-08-24 ENCOUNTER — OFFICE VISIT (OUTPATIENT)
Dept: INTERNAL MEDICINE | Facility: CLINIC | Age: 23
End: 2023-08-24
Payer: COMMERCIAL

## 2023-08-24 VITALS
BODY MASS INDEX: 45.99 KG/M2 | SYSTOLIC BLOOD PRESSURE: 124 MMHG | HEART RATE: 94 BPM | OXYGEN SATURATION: 98 % | TEMPERATURE: 97.3 F | WEIGHT: 293 LBS | HEIGHT: 67 IN | DIASTOLIC BLOOD PRESSURE: 78 MMHG

## 2023-08-24 DIAGNOSIS — F33.0 MILD EPISODE OF RECURRENT MAJOR DEPRESSIVE DISORDER: Primary | ICD-10-CM

## 2023-08-24 DIAGNOSIS — F40.10 SOCIAL ANXIETY DISORDER: ICD-10-CM

## 2023-08-24 DIAGNOSIS — F41.1 GENERALIZED ANXIETY DISORDER: ICD-10-CM

## 2023-08-24 DIAGNOSIS — E55.9 VITAMIN D DEFICIENCY: ICD-10-CM

## 2023-08-24 PROCEDURE — 99214 OFFICE O/P EST MOD 30 MIN: CPT | Performed by: INTERNAL MEDICINE

## 2023-08-24 RX ORDER — VENLAFAXINE HYDROCHLORIDE 150 MG/1
150 CAPSULE, EXTENDED RELEASE ORAL DAILY
COMMUNITY

## 2023-08-24 NOTE — PROGRESS NOTES
"Internal Medicine Follow Up    Chief Complaint  Deysi Ordoñez is a 23 y.o. female who presents today for follow up of chronic medical conditions outlined below.    Chief Complaint   Patient presents with    Anxiety    Depression        HPI  Ms. Ordoñez comes in today for follow up. Resumed wellbutrin and effexor. Has increased effexor back to 150mg daily. Notes 50-75% improvement in symptoms. Does note \"crashing\" mid afternoon which she previously experienced on effexor but resolved with addition of wellbutrin. She has doubled vitamin D dose.    Anxiety  Symptoms include depressed mood and nervous/anxious behavior.     Her past medical history is significant for depression.   DepressionPatient presents with the following symptoms: depressed mood and nervousness/anxiety.         Review of Systems  Review of Systems   Constitutional:  Positive for fatigue (mid afternoon).   Psychiatric/Behavioral:  Positive for depressed mood. The patient is nervous/anxious.       Current Medications  Current Outpatient Medications on File Prior to Visit   Medication Sig Dispense Refill    albuterol (ACCUNEB) 1.25 MG/3ML nebulizer solution Take 3 mL by nebulization Every 6 (Six) Hours As Needed.      albuterol sulfate  (90 Base) MCG/ACT inhaler Inhale 1-2 puffs See Admin Instructions. Inhale 1-2 puffs every 4 to 6 hours as needed      buPROPion XL (WELLBUTRIN XL) 150 MG 24 hr tablet Take 1 tablet by mouth Every Morning. 90 tablet 0    cholecalciferol (VITAMIN D3) 25 MCG (1000 UT) tablet Take 2 tablets by mouth Daily.      Fluticasone Furoate-Vilanterol (Breo Ellipta) 100-25 MCG/INH inhaler       hydrOXYzine (ATARAX) 25 MG tablet TAKE 1 TABLET BY MOUTH THREE TIMES A DAY AS NEEDED FOR ANXIETY 90 tablet 0    montelukast (SINGULAIR) 10 MG tablet       naproxen (NAPROSYN) 500 MG tablet Take 1 tablet by mouth 2 (Two) Times a Day As Needed for Headache. 30 tablet 5    norethindrone-ethinyl estradiol FE (Junel FE 1/20) 1-20 MG-MCG " "per tablet TAKE 1 TABLET BY MOUTH DAILY  DAYS. SKIP PLACEBO WEEK 112 tablet 3    propranolol (INDERAL) 10 MG tablet TAKE 1 TABLET BY MOUTH 2 (TWO) TIMES A DAY AS NEEDED (ANXIETY). 60 tablet 0    Sodium Fluoride 5000 Sensitive 1.1-5 % gel BRUSH WITH EVERY NIGHT. SPIT OUT DO NOT RINSE      SUMAtriptan (IMITREX) 50 MG tablet Take one tablet at onset of headache. May repeat dose one time in 2 hours if headache not relieved. 12 tablet 2    venlafaxine XR (EFFEXOR-XR) 150 MG 24 hr capsule Take 1 capsule by mouth Daily.      [DISCONTINUED] venlafaxine XR (Effexor XR) 75 MG 24 hr capsule Take 1 capsule by mouth Daily. Increase to 2 capsules after 2 weeks. 90 capsule 0     No current facility-administered medications on file prior to visit.       Allergies  No Known Allergies    Objective  Visit Vitals  /78   Pulse 94   Temp 97.3 øF (36.3 øC)   Ht 170.2 cm (67\")   Wt (!) 138 kg (304 lb)   LMP  (LMP Unknown)   SpO2 98%   BMI 47.61 kg/mý        Physical Exam  Physical Exam  Vitals and nursing note reviewed.   Constitutional:       General: She is not in acute distress.     Appearance: She is well-developed. She is not ill-appearing or toxic-appearing.   HENT:      Head: Normocephalic and atraumatic.   Eyes:      Conjunctiva/sclera: Conjunctivae normal.   Pulmonary:      Effort: Pulmonary effort is normal. No respiratory distress.   Neurological:      Mental Status: She is alert and oriented to person, place, and time. Mental status is at baseline.   Psychiatric:         Mood and Affect: Mood normal.         Behavior: Behavior normal.         Thought Content: Thought content normal.         Judgment: Judgment normal.       Results  Results for orders placed or performed in visit on 07/25/23   CBC (No Diff)    Specimen: Blood   Result Value Ref Range    WBC 5.63 3.40 - 10.80 10*3/mm3    RBC 4.70 3.77 - 5.28 10*6/mm3    Hemoglobin 13.9 12.0 - 15.9 g/dL    Hematocrit 40.4 34.0 - 46.6 %    MCV 86.0 79.0 - 97.0 fL    " MCH 29.6 26.6 - 33.0 pg    MCHC 34.4 31.5 - 35.7 g/dL    RDW 12.0 (L) 12.3 - 15.4 %    RDW-SD 37.5 37.0 - 54.0 fl    MPV 9.7 6.0 - 12.0 fL    Platelets 336 140 - 450 10*3/mm3   Comprehensive Metabolic Panel    Specimen: Blood   Result Value Ref Range    Glucose 88 65 - 99 mg/dL    BUN 11 6 - 20 mg/dL    Creatinine 0.85 0.57 - 1.00 mg/dL    Sodium 142 136 - 145 mmol/L    Potassium 4.3 3.5 - 5.2 mmol/L    Chloride 108 (H) 98 - 107 mmol/L    CO2 24.0 22.0 - 29.0 mmol/L    Calcium 9.2 8.6 - 10.5 mg/dL    Total Protein 7.0 6.0 - 8.5 g/dL    Albumin 4.4 3.5 - 5.2 g/dL    ALT (SGPT) 13 1 - 33 U/L    AST (SGOT) 27 1 - 32 U/L    Alkaline Phosphatase 57 39 - 117 U/L    Total Bilirubin 0.3 0.0 - 1.2 mg/dL    Globulin 2.6 gm/dL    A/G Ratio 1.7 g/dL    BUN/Creatinine Ratio 12.9 7.0 - 25.0    Anion Gap 10.0 5.0 - 15.0 mmol/L    eGFR 97.6 >60.0 mL/min/1.73   Lipid Panel    Specimen: Blood   Result Value Ref Range    Total Cholesterol 204 (H) 0 - 200 mg/dL    Triglycerides 94 0 - 150 mg/dL    HDL Cholesterol 58 40 - 60 mg/dL    LDL Cholesterol  129 (H) 0 - 100 mg/dL    VLDL Cholesterol 17 5 - 40 mg/dL    LDL/HDL Ratio 2.19    Hemoglobin A1c    Specimen: Blood   Result Value Ref Range    Hemoglobin A1C 5.10 4.80 - 5.60 %   Vitamin D,25-Hydroxy    Specimen: Blood   Result Value Ref Range    25 Hydroxy, Vitamin D 25.1 (L) 30.0 - 100.0 ng/ml        Assessment and Plan  Diagnoses and all orders for this visit:    Mild episode of recurrent major depressive disorder  Generalized anxiety disorder  Social anxiety disorder  - doing better since resuming wellbutrin and effexor but having daytime drowsiness  - will continue wellbturin 150mg daily and have her move effexor 150mg to evening.    Vitamin D deficiency  - improving but still insufficient  - has doubled supplement  - recheck next visit    Health Maintenance  - Pap smear: 6/2022 negative. Hx of ASCUS. GYN High.  - Mammogram: Start screening at age 38.  - Colonoscopy: Start screening  at age 43.  - HCV: negative  - Immunizations: COVID UTD. Tdap 2016 per patient. HPV complete per patient. PCV20 UTD.  - Depression screening: negative 7/2023    Return in about 3 months (around 11/24/2023) for Follow up mood.    Answers submitted by the patient for this visit:  Other (Submitted on 8/23/2023)  Please describe your symptoms.: Depression medication follow up  Have you had these symptoms before?: Yes  How long have you been having these symptoms?: 1-2 weeks  Please list any medications you are currently taking for this condition.: Venlafaxine 150 mg and bupropion 150 mg  Primary Reason for Visit (Submitted on 8/23/2023)  What is the primary reason for your visit?: Other

## 2023-10-20 DIAGNOSIS — F41.1 GENERALIZED ANXIETY DISORDER: Chronic | ICD-10-CM

## 2023-10-20 DIAGNOSIS — F33.0 MILD EPISODE OF RECURRENT MAJOR DEPRESSIVE DISORDER: Chronic | ICD-10-CM

## 2023-10-20 DIAGNOSIS — F40.10 SOCIAL ANXIETY DISORDER: ICD-10-CM

## 2023-10-20 RX ORDER — BUPROPION HYDROCHLORIDE 150 MG/1
150 TABLET ORAL EVERY MORNING
Qty: 90 TABLET | Refills: 0 | Status: SHIPPED | OUTPATIENT
Start: 2023-10-20

## 2023-12-18 ENCOUNTER — OFFICE VISIT (OUTPATIENT)
Dept: INTERNAL MEDICINE | Facility: CLINIC | Age: 23
End: 2023-12-18
Payer: COMMERCIAL

## 2023-12-18 VITALS
WEIGHT: 293 LBS | SYSTOLIC BLOOD PRESSURE: 134 MMHG | HEART RATE: 106 BPM | OXYGEN SATURATION: 98 % | TEMPERATURE: 97.1 F | BODY MASS INDEX: 45.99 KG/M2 | HEIGHT: 67 IN | DIASTOLIC BLOOD PRESSURE: 88 MMHG

## 2023-12-18 DIAGNOSIS — F41.9 ANXIETY AND DEPRESSION: Primary | ICD-10-CM

## 2023-12-18 DIAGNOSIS — F32.A ANXIETY AND DEPRESSION: Primary | ICD-10-CM

## 2023-12-18 PROCEDURE — 99214 OFFICE O/P EST MOD 30 MIN: CPT | Performed by: INTERNAL MEDICINE

## 2023-12-18 NOTE — PROGRESS NOTES
ASSUMED CAREEE. Apprears to be drowsy and opens eyes to verbal stimuli. Hob up to 30 degrees . ON HEPARIN ,AMIODARONE,KE SYNEPHRINE DRIP,VASOPRESSIN AND NORMAL SALINE DRIP via CVL DRIP. No complain of pain at this time. Immobilizer to right leg on. Skin cool to touch and both lower extremities pale and purplish. Pedal pulses faint upon palpation. Vomiting and with thick greyish green emesis. NEW  Navarro catheter inserted as ordered for urinary retension. Internal Medicine Follow Up    Chief Complaint  Deysi Ordoñez is a 23 y.o. female who presents today for follow up of chronic medical conditions outlined below.    Chief Complaint   Patient presents with    Anxiety    Depression        HPI  Ms. Ordoñez comes in today for follow up on depression and anxiety. Notes lately more depressed. No motivation. Feels fatigued. She is on wellbutrin and effexor. Prefers to stay on effexor because she feels it is beneficial. She notes trying sertraline in the past as well. She attributes worsened mood to winter, shorter days. Has heard about seasonal affective light boxes but has not tried one before.    Anxiety  Symptoms include depressed mood.     Her past medical history is significant for depression.   DepressionPatient presents with the following symptoms: depressed mood.           Review of Systems  Review of Systems   Constitutional:  Positive for fatigue.   Psychiatric/Behavioral:  Positive for depressed mood.         Current Medications  Current Outpatient Medications on File Prior to Visit   Medication Sig Dispense Refill    albuterol (ACCUNEB) 1.25 MG/3ML nebulizer solution Take 3 mL by nebulization Every 6 (Six) Hours As Needed.      albuterol sulfate  (90 Base) MCG/ACT inhaler Inhale 1-2 puffs See Admin Instructions. Inhale 1-2 puffs every 4 to 6 hours as needed      cholecalciferol (VITAMIN D3) 25 MCG (1000 UT) tablet Take 2 tablets by mouth Daily.      Fluticasone Furoate-Vilanterol (Breo Ellipta) 100-25 MCG/INH inhaler       hydrOXYzine (ATARAX) 25 MG tablet TAKE 1 TABLET BY MOUTH THREE TIMES A DAY AS NEEDED FOR ANXIETY 90 tablet 0    montelukast (SINGULAIR) 10 MG tablet       naproxen (NAPROSYN) 500 MG tablet Take 1 tablet by mouth 2 (Two) Times a Day As Needed for Headache. 30 tablet 5    norethindrone-ethinyl estradiol FE (Junel FE 1/20) 1-20 MG-MCG per tablet TAKE 1 TABLET BY MOUTH DAILY  DAYS. SKIP PLACEBO WEEK 112 tablet 3    propranolol  "(INDERAL) 10 MG tablet TAKE 1 TABLET BY MOUTH 2 (TWO) TIMES A DAY AS NEEDED (ANXIETY). 60 tablet 0    Sodium Fluoride 5000 Sensitive 1.1-5 % gel BRUSH WITH EVERY NIGHT. SPIT OUT DO NOT RINSE      SUMAtriptan (IMITREX) 50 MG tablet Take one tablet at onset of headache. May repeat dose one time in 2 hours if headache not relieved. 12 tablet 2    venlafaxine XR (EFFEXOR-XR) 150 MG 24 hr capsule Take 1 capsule by mouth Daily.      [DISCONTINUED] buPROPion XL (WELLBUTRIN XL) 150 MG 24 hr tablet TAKE 1 TABLET BY MOUTH EVERY DAY IN THE MORNING 90 tablet 0     No current facility-administered medications on file prior to visit.       Allergies  No Known Allergies    Objective  Visit Vitals  /88   Pulse 106   Temp 97.1 °F (36.2 °C)   Ht 170.2 cm (67\")   Wt (!) 140 kg (309 lb)   SpO2 98%   BMI 48.40 kg/m²        Physical Exam  Physical Exam  Vitals and nursing note reviewed.   Constitutional:       General: She is not in acute distress.     Appearance: She is well-developed. She is obese. She is not toxic-appearing.   HENT:      Head: Normocephalic and atraumatic.   Eyes:      Conjunctiva/sclera: Conjunctivae normal.   Pulmonary:      Effort: Pulmonary effort is normal. No respiratory distress.   Skin:     General: Skin is warm and dry.   Neurological:      Mental Status: She is alert and oriented to person, place, and time. Mental status is at baseline.   Psychiatric:         Mood and Affect: Affect normal. Mood is depressed.         Behavior: Behavior normal.         Thought Content: Thought content normal.         Judgment: Judgment normal.         Results  Results for orders placed or performed in visit on 07/25/23   CBC (No Diff)    Specimen: Blood   Result Value Ref Range    WBC 5.63 3.40 - 10.80 10*3/mm3    RBC 4.70 3.77 - 5.28 10*6/mm3    Hemoglobin 13.9 12.0 - 15.9 g/dL    Hematocrit 40.4 34.0 - 46.6 %    MCV 86.0 79.0 - 97.0 fL    MCH 29.6 26.6 - 33.0 pg    MCHC 34.4 31.5 - 35.7 g/dL    RDW 12.0 (L) 12.3 - " 15.4 %    RDW-SD 37.5 37.0 - 54.0 fl    MPV 9.7 6.0 - 12.0 fL    Platelets 336 140 - 450 10*3/mm3   Comprehensive Metabolic Panel    Specimen: Blood   Result Value Ref Range    Glucose 88 65 - 99 mg/dL    BUN 11 6 - 20 mg/dL    Creatinine 0.85 0.57 - 1.00 mg/dL    Sodium 142 136 - 145 mmol/L    Potassium 4.3 3.5 - 5.2 mmol/L    Chloride 108 (H) 98 - 107 mmol/L    CO2 24.0 22.0 - 29.0 mmol/L    Calcium 9.2 8.6 - 10.5 mg/dL    Total Protein 7.0 6.0 - 8.5 g/dL    Albumin 4.4 3.5 - 5.2 g/dL    ALT (SGPT) 13 1 - 33 U/L    AST (SGOT) 27 1 - 32 U/L    Alkaline Phosphatase 57 39 - 117 U/L    Total Bilirubin 0.3 0.0 - 1.2 mg/dL    Globulin 2.6 gm/dL    A/G Ratio 1.7 g/dL    BUN/Creatinine Ratio 12.9 7.0 - 25.0    Anion Gap 10.0 5.0 - 15.0 mmol/L    eGFR 97.6 >60.0 mL/min/1.73   Lipid Panel    Specimen: Blood   Result Value Ref Range    Total Cholesterol 204 (H) 0 - 200 mg/dL    Triglycerides 94 0 - 150 mg/dL    HDL Cholesterol 58 40 - 60 mg/dL    LDL Cholesterol  129 (H) 0 - 100 mg/dL    VLDL Cholesterol 17 5 - 40 mg/dL    LDL/HDL Ratio 2.19    Hemoglobin A1c    Specimen: Blood   Result Value Ref Range    Hemoglobin A1C 5.10 4.80 - 5.60 %   Vitamin D,25-Hydroxy    Specimen: Blood   Result Value Ref Range    25 Hydroxy, Vitamin D 25.1 (L) 30.0 - 100.0 ng/ml        Assessment and Plan  Diagnoses and all orders for this visit:    Anxiety and depression  - worsening depression, possible SAD  - will stop wellbutrin. Continue effexor 150mg daily. Start vraylar 1.5mg daily.  - discussed option of seeing psychiatry, defer for now  - discussed SAD light box and she will see if she can get this through [facility]. Handout on proper use provided.     Health Maintenance  - Pap smear: 6/2022 negative. Hx of ASCUS. GYN High.  - Mammogram: Start screening at age 40.  - Colonoscopy: Start screening at age 45.  - HCV: negative  - Immunizations: COVID UTD. flu UTD. Tdap 2016 per patient. HPV complete per patient. PCV20 UTD.  - Depression  screening: negative 7/2023    Return in about 4 weeks (around 1/15/2024) for Follow up mood.

## 2024-01-19 ENCOUNTER — OFFICE VISIT (OUTPATIENT)
Dept: INTERNAL MEDICINE | Facility: CLINIC | Age: 24
End: 2024-01-19
Payer: COMMERCIAL

## 2024-01-19 VITALS
WEIGHT: 293 LBS | BODY MASS INDEX: 45.99 KG/M2 | HEIGHT: 67 IN | DIASTOLIC BLOOD PRESSURE: 88 MMHG | SYSTOLIC BLOOD PRESSURE: 130 MMHG | RESPIRATION RATE: 18 BRPM | OXYGEN SATURATION: 98 % | TEMPERATURE: 96.6 F | HEART RATE: 91 BPM

## 2024-01-19 DIAGNOSIS — F32.A ANXIETY AND DEPRESSION: Primary | ICD-10-CM

## 2024-01-19 DIAGNOSIS — F41.9 ANXIETY AND DEPRESSION: Primary | ICD-10-CM

## 2024-01-19 PROCEDURE — 99213 OFFICE O/P EST LOW 20 MIN: CPT | Performed by: INTERNAL MEDICINE

## 2024-01-19 NOTE — PROGRESS NOTES
Internal Medicine Follow Up    Chief Complaint  Deysi Ordoñez is a 23 y.o. female who presents today for follow up of chronic medical conditions outlined below.    Chief Complaint   Patient presents with    Anxiety and depression        HPI  Ms. Ordoñez comes in today for follow up on anxiety and depression. She is doing much better with addition of vraylar. She reports that she is sleeping well 10p-7a each night. Her appetite is increased but she is opting for healthier food options. She did have brief increase in GERD around [place] which has resolved. Does feel jittery on occasion but no tremor or tardive dyskinesia. She is happy with how she is feeling. She denies new concerns.         Review of Systems  Review of Systems   Constitutional:  Positive for appetite change (increase).   Respiratory: Negative.     Cardiovascular: Negative.    Gastrointestinal: Negative.    Psychiatric/Behavioral:  Positive for positive for hyperactivity (occasional). Negative for sleep disturbance and depressed mood. The patient is not nervous/anxious.         Current Medications  Current Outpatient Medications on File Prior to Visit   Medication Sig Dispense Refill    albuterol (ACCUNEB) 1.25 MG/3ML nebulizer solution Take 3 mL by nebulization Every 6 (Six) Hours As Needed.      albuterol sulfate  (90 Base) MCG/ACT inhaler Inhale 1-2 puffs See Admin Instructions. Inhale 1-2 puffs every 4 to 6 hours as needed      cholecalciferol (VITAMIN D3) 25 MCG (1000 UT) tablet Take 2 tablets by mouth Daily.      Fluticasone Furoate-Vilanterol (Breo Ellipta) 100-25 MCG/INH inhaler       hydrOXYzine (ATARAX) 25 MG tablet TAKE 1 TABLET BY MOUTH THREE TIMES A DAY AS NEEDED FOR ANXIETY 90 tablet 0    montelukast (SINGULAIR) 10 MG tablet       naproxen (NAPROSYN) 500 MG tablet Take 1 tablet by mouth 2 (Two) Times a Day As Needed for Headache. 30 tablet 5    norethindrone-ethinyl estradiol FE (Junel FE 1/20) 1-20 MG-MCG per tablet TAKE 1  "TABLET BY MOUTH DAILY  DAYS. SKIP PLACEBO WEEK 112 tablet 3    propranolol (INDERAL) 10 MG tablet TAKE 1 TABLET BY MOUTH 2 (TWO) TIMES A DAY AS NEEDED (ANXIETY). 60 tablet 0    Sodium Fluoride 5000 Sensitive 1.1-5 % gel BRUSH WITH EVERY NIGHT. SPIT OUT DO NOT RINSE      SUMAtriptan (IMITREX) 50 MG tablet Take one tablet at onset of headache. May repeat dose one time in 2 hours if headache not relieved. 12 tablet 2    venlafaxine XR (EFFEXOR-XR) 150 MG 24 hr capsule Take 1 capsule by mouth Daily.      [DISCONTINUED] Cariprazine HCl (Vraylar) 1.5 MG capsule capsule Take 1 capsule by mouth Daily. 30 capsule 1     No current facility-administered medications on file prior to visit.       Allergies  No Known Allergies    Objective  Visit Vitals  /88   Pulse 91   Temp 96.6 °F (35.9 °C) (Temporal)   Resp 18   Ht 170.2 cm (67\")   Wt (!) 144 kg (317 lb)   SpO2 98%   BMI 49.65 kg/m²        Physical Exam  Physical Exam  Vitals and nursing note reviewed.   Constitutional:       General: She is not in acute distress.     Appearance: She is well-developed. She is obese. She is not ill-appearing or toxic-appearing.   HENT:      Head: Normocephalic and atraumatic.   Eyes:      Conjunctiva/sclera: Conjunctivae normal.   Pulmonary:      Effort: Pulmonary effort is normal. No respiratory distress.   Neurological:      Mental Status: She is alert and oriented to person, place, and time.      Motor: No tremor.   Psychiatric:         Mood and Affect: Mood normal.         Behavior: Behavior normal.         Thought Content: Thought content normal.         Judgment: Judgment normal.         Results  Results for orders placed or performed in visit on 07/25/23   CBC (No Diff)    Specimen: Blood   Result Value Ref Range    WBC 5.63 3.40 - 10.80 10*3/mm3    RBC 4.70 3.77 - 5.28 10*6/mm3    Hemoglobin 13.9 12.0 - 15.9 g/dL    Hematocrit 40.4 34.0 - 46.6 %    MCV 86.0 79.0 - 97.0 fL    MCH 29.6 26.6 - 33.0 pg    MCHC 34.4 31.5 - 35.7 " g/dL    RDW 12.0 (L) 12.3 - 15.4 %    RDW-SD 37.5 37.0 - 54.0 fl    MPV 9.7 6.0 - 12.0 fL    Platelets 336 140 - 450 10*3/mm3   Comprehensive Metabolic Panel    Specimen: Blood   Result Value Ref Range    Glucose 88 65 - 99 mg/dL    BUN 11 6 - 20 mg/dL    Creatinine 0.85 0.57 - 1.00 mg/dL    Sodium 142 136 - 145 mmol/L    Potassium 4.3 3.5 - 5.2 mmol/L    Chloride 108 (H) 98 - 107 mmol/L    CO2 24.0 22.0 - 29.0 mmol/L    Calcium 9.2 8.6 - 10.5 mg/dL    Total Protein 7.0 6.0 - 8.5 g/dL    Albumin 4.4 3.5 - 5.2 g/dL    ALT (SGPT) 13 1 - 33 U/L    AST (SGOT) 27 1 - 32 U/L    Alkaline Phosphatase 57 39 - 117 U/L    Total Bilirubin 0.3 0.0 - 1.2 mg/dL    Globulin 2.6 gm/dL    A/G Ratio 1.7 g/dL    BUN/Creatinine Ratio 12.9 7.0 - 25.0    Anion Gap 10.0 5.0 - 15.0 mmol/L    eGFR 97.6 >60.0 mL/min/1.73   Lipid Panel    Specimen: Blood   Result Value Ref Range    Total Cholesterol 204 (H) 0 - 200 mg/dL    Triglycerides 94 0 - 150 mg/dL    HDL Cholesterol 58 40 - 60 mg/dL    LDL Cholesterol  129 (H) 0 - 100 mg/dL    VLDL Cholesterol 17 5 - 40 mg/dL    LDL/HDL Ratio 2.19    Hemoglobin A1c    Specimen: Blood   Result Value Ref Range    Hemoglobin A1C 5.10 4.80 - 5.60 %   Vitamin D,25-Hydroxy    Specimen: Blood   Result Value Ref Range    25 Hydroxy, Vitamin D 25.1 (L) 30.0 - 100.0 ng/ml        Assessment and Plan  Diagnoses and all orders for this visit:    Anxiety and depression  - doing well with addition of vraylar  - will continue vraylar 1.5mg daily and effexor 150mg daily  - she will monitor appetite and continue to eat healthy foods to limit any weight gain as a side effect of vraylar    Health Maintenance  - Pap smear: 6/2022 negative. Hx of ASCUS. GYN High.  - Mammogram: Start screening at age 40.  - Colonoscopy: Start screening at age 43.  - HCV: negative  - Immunizations: COVID UTD. flu UTD. Tdap 2016 per patient. HPV complete per patient. PCV20 UTD.  - Depression screening: negative 7/2023    Return in about 3  months (around 4/19/2024) for Follow up mood.

## 2024-01-24 DIAGNOSIS — F33.0 MILD EPISODE OF RECURRENT MAJOR DEPRESSIVE DISORDER: Chronic | ICD-10-CM

## 2024-01-24 DIAGNOSIS — F41.1 GENERALIZED ANXIETY DISORDER: Chronic | ICD-10-CM

## 2024-01-24 DIAGNOSIS — F40.10 SOCIAL ANXIETY DISORDER: ICD-10-CM

## 2024-01-24 RX ORDER — BUPROPION HYDROCHLORIDE 150 MG/1
150 TABLET ORAL EVERY MORNING
Qty: 90 TABLET | Refills: 0 | OUTPATIENT
Start: 2024-01-24

## 2024-04-19 ENCOUNTER — OFFICE VISIT (OUTPATIENT)
Dept: INTERNAL MEDICINE | Facility: CLINIC | Age: 24
End: 2024-04-19
Payer: COMMERCIAL

## 2024-04-19 VITALS
SYSTOLIC BLOOD PRESSURE: 148 MMHG | DIASTOLIC BLOOD PRESSURE: 88 MMHG | OXYGEN SATURATION: 100 % | HEART RATE: 100 BPM | WEIGHT: 293 LBS | BODY MASS INDEX: 45.99 KG/M2 | HEIGHT: 67 IN

## 2024-04-19 DIAGNOSIS — F32.A ANXIETY AND DEPRESSION: Primary | ICD-10-CM

## 2024-04-19 DIAGNOSIS — F41.9 ANXIETY AND DEPRESSION: Primary | ICD-10-CM

## 2024-04-19 PROCEDURE — 99214 OFFICE O/P EST MOD 30 MIN: CPT | Performed by: INTERNAL MEDICINE

## 2024-04-19 RX ORDER — BUSPIRONE HYDROCHLORIDE 5 MG/1
5 TABLET ORAL 3 TIMES DAILY
Qty: 90 TABLET | Refills: 0 | Status: SHIPPED | OUTPATIENT
Start: 2024-04-19

## 2024-04-19 RX ORDER — IPRATROPIUM BROMIDE 42 UG/1
SPRAY, METERED NASAL
COMMUNITY
Start: 2024-03-26

## 2024-04-19 RX ORDER — FLUTICASONE FUROATE, UMECLIDINIUM BROMIDE AND VILANTEROL TRIFENATATE 100; 62.5; 25 UG/1; UG/1; UG/1
1 POWDER RESPIRATORY (INHALATION) DAILY
COMMUNITY
Start: 2024-03-04

## 2024-04-19 NOTE — PROGRESS NOTES
Internal Medicine Follow Up    Chief Complaint  Deysi Ordoñez is a 23 y.o. female who presents today for follow up of chronic medical conditions outlined below.    Chief Complaint   Patient presents with    Anxiety     Increased over the past couple of weeks.     Depression        HPI  Ms. Ordoñez comes in today for follow up on mood. Reports depression controlled but increase in anxiety x2 weeks. She notes that this is primarily related to work. 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 She notes also that her grandmother passed somewhat unexpectedly in the hospital a little over two weeks ago. She has been having panic attacks when she tries to do her work. She is interested in counseling. She has been taking propranolol and hydroxyzine PRN but little efficacy.    Anxiety  Presents for follow-up visit.  Symptoms include dizziness, dry mouth, excessive worry, insomnia, irritability, nausea, nervous/anxious behavior, obsessions, palpitations, shortness of breath and malaise/fatigue.  Patient reports no chest pain, confusion or depressed mood. Symptoms occur constantly. The severity of symptoms is severe. The symptoms are aggravated by work stress. The patient sleeps 8 hours per night. The quality of sleep is fair. Her past medical history is significant for depression. Compliance with medications is %. Side effects of treatment include fatigue.   DepressionSymptoms include dry mouth, excessive worry, insomnia, irritability, nervousness/anxiety, obsessions, palpitations, shortness of breath, dizziness, malaise/fatigue and nausea. Patient is not experiencing: confusion, depressed mood and chest pain.  Her past medical history is significant for depression.        Review of Systems  Review of Systems   Constitutional:  Positive for irritability and malaise/fatigue.   Respiratory:  Positive for shortness of breath.    Cardiovascular:  Positive for palpitations.  Negative for chest pain.   Gastrointestinal:  Positive for nausea.   Skin:         Facial flushing   Neurological:  Positive for dizziness. Negative for confusion.   Psychiatric/Behavioral:  Positive for stress. Negative for depressed mood. The patient is nervous/anxious and has insomnia.         Current Medications  Current Outpatient Medications on File Prior to Visit   Medication Sig Dispense Refill    albuterol (ACCUNEB) 1.25 MG/3ML nebulizer solution Take 3 mL by nebulization Every 6 (Six) Hours As Needed.      albuterol sulfate  (90 Base) MCG/ACT inhaler Inhale 1-2 puffs See Admin Instructions. Inhale 1-2 puffs every 4 to 6 hours as needed      Cariprazine HCl (Vraylar) 1.5 MG capsule capsule Take 1 capsule by mouth Daily. 90 capsule 1    cholecalciferol (VITAMIN D3) 25 MCG (1000 UT) tablet Take 2 tablets by mouth Daily.      hydrOXYzine (ATARAX) 25 MG tablet TAKE 1 TABLET BY MOUTH THREE TIMES A DAY AS NEEDED FOR ANXIETY 90 tablet 0    ipratropium (ATROVENT) 0.06 % nasal spray 1-2 SPRAYS EACH NOSTRIL 2 TIMES DAILY AS NEEDED      montelukast (SINGULAIR) 10 MG tablet       naproxen (NAPROSYN) 500 MG tablet Take 1 tablet by mouth 2 (Two) Times a Day As Needed for Headache. 30 tablet 5    norethindrone-ethinyl estradiol FE (Junel FE 1/20) 1-20 MG-MCG per tablet TAKE 1 TABLET BY MOUTH DAILY  DAYS. SKIP PLACEBO WEEK 112 tablet 3    propranolol (INDERAL) 10 MG tablet TAKE 1 TABLET BY MOUTH 2 (TWO) TIMES A DAY AS NEEDED (ANXIETY). 60 tablet 0    Sodium Fluoride 5000 Sensitive 1.1-5 % gel BRUSH WITH EVERY NIGHT. SPIT OUT DO NOT RINSE      SUMAtriptan (IMITREX) 50 MG tablet Take one tablet at onset of headache. May repeat dose one time in 2 hours if headache not relieved. 12 tablet 2    Trelegy Ellipta 100-62.5-25 MCG/ACT inhaler Inhale 1 puff Daily.      venlafaxine XR (EFFEXOR-XR) 150 MG 24 hr capsule Take 1 capsule by mouth Daily.      [DISCONTINUED] Fluticasone Furoate-Vilanterol (Breo Ellipta) 100-25  "MCG/INH inhaler        No current facility-administered medications on file prior to visit.       Allergies  No Known Allergies    Objective  Visit Vitals  /88   Pulse 100   Ht 170.2 cm (67\")   Wt (!) 143 kg (315 lb)   SpO2 100%   BMI 49.34 kg/m²        Physical Exam  Physical Exam  Vitals and nursing note reviewed.   Constitutional:       General: She is not in acute distress.     Appearance: She is well-developed. She is obese. She is not ill-appearing or toxic-appearing.   HENT:      Head: Normocephalic and atraumatic.   Eyes:      Conjunctiva/sclera: Conjunctivae normal.   Pulmonary:      Effort: Pulmonary effort is normal. No respiratory distress.   Skin:     General: Skin is warm and dry.   Neurological:      Mental Status: She is alert and oriented to person, place, and time. Mental status is at baseline.      Gait: Gait normal.   Psychiatric:         Mood and Affect: Affect normal. Mood is anxious. Mood is not depressed.         Behavior: Behavior normal.         Thought Content: Thought content normal.         Judgment: Judgment normal.         Results  Results for orders placed or performed in visit on 07/25/23   CBC (No Diff)    Specimen: Blood   Result Value Ref Range    WBC 5.63 3.40 - 10.80 10*3/mm3    RBC 4.70 3.77 - 5.28 10*6/mm3    Hemoglobin 13.9 12.0 - 15.9 g/dL    Hematocrit 40.4 34.0 - 46.6 %    MCV 86.0 79.0 - 97.0 fL    MCH 29.6 26.6 - 33.0 pg    MCHC 34.4 31.5 - 35.7 g/dL    RDW 12.0 (L) 12.3 - 15.4 %    RDW-SD 37.5 37.0 - 54.0 fl    MPV 9.7 6.0 - 12.0 fL    Platelets 336 140 - 450 10*3/mm3   Comprehensive Metabolic Panel    Specimen: Blood   Result Value Ref Range    Glucose 88 65 - 99 mg/dL    BUN 11 6 - 20 mg/dL    Creatinine 0.85 0.57 - 1.00 mg/dL    Sodium 142 136 - 145 mmol/L    Potassium 4.3 3.5 - 5.2 mmol/L    Chloride 108 (H) 98 - 107 mmol/L    CO2 24.0 22.0 - 29.0 mmol/L    Calcium 9.2 8.6 - 10.5 mg/dL    Total Protein 7.0 6.0 - 8.5 g/dL    Albumin 4.4 3.5 - 5.2 g/dL    ALT " (SGPT) 13 1 - 33 U/L    AST (SGOT) 27 1 - 32 U/L    Alkaline Phosphatase 57 39 - 117 U/L    Total Bilirubin 0.3 0.0 - 1.2 mg/dL    Globulin 2.6 gm/dL    A/G Ratio 1.7 g/dL    BUN/Creatinine Ratio 12.9 7.0 - 25.0    Anion Gap 10.0 5.0 - 15.0 mmol/L    eGFR 97.6 >60.0 mL/min/1.73   Lipid Panel    Specimen: Blood   Result Value Ref Range    Total Cholesterol 204 (H) 0 - 200 mg/dL    Triglycerides 94 0 - 150 mg/dL    HDL Cholesterol 58 40 - 60 mg/dL    LDL Cholesterol  129 (H) 0 - 100 mg/dL    VLDL Cholesterol 17 5 - 40 mg/dL    LDL/HDL Ratio 2.19    Hemoglobin A1c    Specimen: Blood   Result Value Ref Range    Hemoglobin A1C 5.10 4.80 - 5.60 %   Vitamin D,25-Hydroxy    Specimen: Blood   Result Value Ref Range    25 Hydroxy, Vitamin D 25.1 (L) 30.0 - 100.0 ng/ml        Assessment and Plan  Diagnoses and all orders for this visit:    Anxiety and depression  - depression stable on effexor 150mg daily and vraylar 1.5mg daily. Anxiety worsening despite use of propranolol and hydroxyzine PRN.  - will add buspar 5mg TID.  - referral for counseling. Offered referral to psychiatry as well but she would like to see how new medication works first.  - will follow up in 2 weeks    Health Maintenance  - Pap smear: 6/2022 negative. Hx of ASCUS. GYN High.  - Mammogram: Start screening at age 40.  - Colonoscopy: Start screening at age 43.  - HCV: negative  - Immunizations: COVID UTD. flu UTD. Tdap 2016 per patient. HPV complete per patient. PCV20 UTD.  - Depression screening: negative 4/2024    Return in about 2 weeks (around 5/3/2024) for Follow up anxiety.

## 2024-05-06 ENCOUNTER — OFFICE VISIT (OUTPATIENT)
Dept: INTERNAL MEDICINE | Facility: CLINIC | Age: 24
End: 2024-05-06
Payer: COMMERCIAL

## 2024-05-06 VITALS
WEIGHT: 293 LBS | BODY MASS INDEX: 45.99 KG/M2 | SYSTOLIC BLOOD PRESSURE: 128 MMHG | OXYGEN SATURATION: 100 % | HEIGHT: 67 IN | TEMPERATURE: 97.8 F | DIASTOLIC BLOOD PRESSURE: 98 MMHG | HEART RATE: 94 BPM

## 2024-05-06 DIAGNOSIS — F41.9 ANXIETY AND DEPRESSION: ICD-10-CM

## 2024-05-06 DIAGNOSIS — F32.A ANXIETY AND DEPRESSION: ICD-10-CM

## 2024-05-06 PROCEDURE — 99213 OFFICE O/P EST LOW 20 MIN: CPT | Performed by: INTERNAL MEDICINE

## 2024-05-06 RX ORDER — BUSPIRONE HYDROCHLORIDE 5 MG/1
5 TABLET ORAL 3 TIMES DAILY
Qty: 270 TABLET | Refills: 0 | Status: SHIPPED | OUTPATIENT
Start: 2024-05-06

## 2024-05-31 ENCOUNTER — OFFICE VISIT (OUTPATIENT)
Dept: INTERNAL MEDICINE | Facility: CLINIC | Age: 24
End: 2024-05-31
Payer: COMMERCIAL

## 2024-05-31 VITALS
OXYGEN SATURATION: 99 % | HEART RATE: 95 BPM | BODY MASS INDEX: 45.99 KG/M2 | WEIGHT: 293 LBS | DIASTOLIC BLOOD PRESSURE: 82 MMHG | HEIGHT: 67 IN | SYSTOLIC BLOOD PRESSURE: 130 MMHG | RESPIRATION RATE: 18 BRPM

## 2024-05-31 DIAGNOSIS — F41.9 ANXIETY AND DEPRESSION: Primary | ICD-10-CM

## 2024-05-31 DIAGNOSIS — F32.A ANXIETY AND DEPRESSION: Primary | ICD-10-CM

## 2024-05-31 PROCEDURE — 99213 OFFICE O/P EST LOW 20 MIN: CPT | Performed by: INTERNAL MEDICINE

## 2024-05-31 NOTE — PROGRESS NOTES
Internal Medicine Follow Up    Chief Complaint  Deysi Ordoñez is a 24 y.o. female who presents today for follow up of chronic medical conditions outlined below.    Chief Complaint   Patient presents with    Anxiety    Depression        HPI  Ms. Ordoñez comes in today for follow up on anxiety and depression. She reports that mood is stable. Has no firm deadlines but several that are flexible. For the time being she has decided to put these off and avoid thinking about them. She was unable to start counseling on 5/23 due to provider illness so now scheduled 6/3. She is hopeful that with counseling she can work toward these deadlines without increase in anxiety. She denies new concerns or concerns with current medications.    Anxiety  Presents for follow-up visit.  Symptoms include depressed mood, excessive worry, insomnia, nausea, nervous/anxious behavior and malaise/fatigue.  Patient reports no chest pain, confusion, dizziness, dry mouth, irritability, obsessions, palpitations or shortness of breath. Symptoms occur constantly. The severity of symptoms is moderate. The symptoms are aggravated by work stress. The patient sleeps 8 hours per night. The quality of sleep is fair. Her past medical history is significant for depression. Compliance with medications is %.   DepressionSymptoms include depressed mood, excessive worry, insomnia, nervousness/anxiety, malaise/fatigue and nausea. Patient is not experiencing: confusion, dry mouth, irritability, obsessions, palpitations, shortness of breath, chest pain and dizziness.  Her past medical history is significant for depression.        Review of Systems  Review of Systems   Constitutional:  Positive for malaise/fatigue. Negative for irritability.   Respiratory:  Negative for shortness of breath.    Cardiovascular:  Negative for chest pain and palpitations.   Gastrointestinal:  Positive for nausea.   Neurological:  Negative for dizziness and confusion.    Psychiatric/Behavioral:  Positive for depressed mood and stress. The patient is nervous/anxious and has insomnia.         Current Medications  Current Outpatient Medications on File Prior to Visit   Medication Sig Dispense Refill    albuterol (ACCUNEB) 1.25 MG/3ML nebulizer solution Take 3 mL by nebulization Every 6 (Six) Hours As Needed.      albuterol sulfate  (90 Base) MCG/ACT inhaler Inhale 1-2 puffs See Admin Instructions. Inhale 1-2 puffs every 4 to 6 hours as needed      busPIRone (BUSPAR) 5 MG tablet Take 1 tablet by mouth 3 (Three) Times a Day. 270 tablet 0    Cariprazine HCl (Vraylar) 1.5 MG capsule capsule Take 1 capsule by mouth Daily. 90 capsule 1    cholecalciferol (VITAMIN D3) 25 MCG (1000 UT) tablet Take 2 tablets by mouth Daily.      hydrOXYzine (ATARAX) 25 MG tablet TAKE 1 TABLET BY MOUTH THREE TIMES A DAY AS NEEDED FOR ANXIETY 90 tablet 0    ipratropium (ATROVENT) 0.06 % nasal spray 1-2 SPRAYS EACH NOSTRIL 2 TIMES DAILY AS NEEDED      montelukast (SINGULAIR) 10 MG tablet       naproxen (NAPROSYN) 500 MG tablet Take 1 tablet by mouth 2 (Two) Times a Day As Needed for Headache. 30 tablet 5    norethindrone-ethinyl estradiol FE (Junel FE 1/20) 1-20 MG-MCG per tablet TAKE 1 TABLET BY MOUTH DAILY  DAYS. SKIP PLACEBO WEEK 112 tablet 3    propranolol (INDERAL) 10 MG tablet TAKE 1 TABLET BY MOUTH 2 (TWO) TIMES A DAY AS NEEDED (ANXIETY). 60 tablet 0    Sodium Fluoride 5000 Sensitive 1.1-5 % gel BRUSH WITH EVERY NIGHT. SPIT OUT DO NOT RINSE      SUMAtriptan (IMITREX) 50 MG tablet Take one tablet at onset of headache. May repeat dose one time in 2 hours if headache not relieved. 12 tablet 2    Trelegy Ellipta 100-62.5-25 MCG/ACT inhaler Inhale 1 puff Daily.      venlafaxine XR (EFFEXOR-XR) 150 MG 24 hr capsule Take 1 capsule by mouth Daily.       No current facility-administered medications on file prior to visit.       Allergies  No Known Allergies    Objective  Visit Vitals  /82  "  Pulse 95   Resp 18   Ht 170.2 cm (67\")   Wt (!) 143 kg (315 lb)   SpO2 99%   BMI 49.34 kg/m²        Physical Exam  Physical Exam  Vitals and nursing note reviewed.   Constitutional:       General: She is not in acute distress.     Appearance: She is well-developed. She is obese.   HENT:      Head: Normocephalic and atraumatic.   Eyes:      Conjunctiva/sclera: Conjunctivae normal.   Pulmonary:      Effort: Pulmonary effort is normal. No respiratory distress.   Neurological:      Mental Status: She is alert and oriented to person, place, and time.   Psychiatric:         Mood and Affect: Mood normal.         Behavior: Behavior normal.         Thought Content: Thought content normal.         Judgment: Judgment normal.         Results  Results for orders placed or performed in visit on 07/25/23   CBC (No Diff)    Specimen: Blood   Result Value Ref Range    WBC 5.63 3.40 - 10.80 10*3/mm3    RBC 4.70 3.77 - 5.28 10*6/mm3    Hemoglobin 13.9 12.0 - 15.9 g/dL    Hematocrit 40.4 34.0 - 46.6 %    MCV 86.0 79.0 - 97.0 fL    MCH 29.6 26.6 - 33.0 pg    MCHC 34.4 31.5 - 35.7 g/dL    RDW 12.0 (L) 12.3 - 15.4 %    RDW-SD 37.5 37.0 - 54.0 fl    MPV 9.7 6.0 - 12.0 fL    Platelets 336 140 - 450 10*3/mm3   Comprehensive Metabolic Panel    Specimen: Blood   Result Value Ref Range    Glucose 88 65 - 99 mg/dL    BUN 11 6 - 20 mg/dL    Creatinine 0.85 0.57 - 1.00 mg/dL    Sodium 142 136 - 145 mmol/L    Potassium 4.3 3.5 - 5.2 mmol/L    Chloride 108 (H) 98 - 107 mmol/L    CO2 24.0 22.0 - 29.0 mmol/L    Calcium 9.2 8.6 - 10.5 mg/dL    Total Protein 7.0 6.0 - 8.5 g/dL    Albumin 4.4 3.5 - 5.2 g/dL    ALT (SGPT) 13 1 - 33 U/L    AST (SGOT) 27 1 - 32 U/L    Alkaline Phosphatase 57 39 - 117 U/L    Total Bilirubin 0.3 0.0 - 1.2 mg/dL    Globulin 2.6 gm/dL    A/G Ratio 1.7 g/dL    BUN/Creatinine Ratio 12.9 7.0 - 25.0    Anion Gap 10.0 5.0 - 15.0 mmol/L    eGFR 97.6 >60.0 mL/min/1.73   Lipid Panel    Specimen: Blood   Result Value Ref Range    " Total Cholesterol 204 (H) 0 - 200 mg/dL    Triglycerides 94 0 - 150 mg/dL    HDL Cholesterol 58 40 - 60 mg/dL    LDL Cholesterol  129 (H) 0 - 100 mg/dL    VLDL Cholesterol 17 5 - 40 mg/dL    LDL/HDL Ratio 2.19    Hemoglobin A1c    Specimen: Blood   Result Value Ref Range    Hemoglobin A1C 5.10 4.80 - 5.60 %   Vitamin D,25-Hydroxy    Specimen: Blood   Result Value Ref Range    25 Hydroxy, Vitamin D 25.1 (L) 30.0 - 100.0 ng/ml        Assessment and Plan  Diagnoses and all orders for this visit:    Anxiety and depression  - mood stable on effexor 150mg daily, vraylar 1.5mg daily, buspar 5mg TID, propranolol and hydroxyzine PRN.  - will continue current medications  - hopeful that she will see improvement in coping skills after starting counseling     Health Maintenance  - Pap smear: 6/2022 negative. Hx of ASCUS. GYN High.  - Mammogram: Start screening at age 40.  - Colonoscopy: Start screening at age 43.  - HCV: negative  - Immunizations: COVID UTD. Tdap 2016 per patient. HPV complete per patient. PCV20 UTD.  - Depression screening: negative 4/2024    Return for Next scheduled follow up.

## 2024-06-03 ENCOUNTER — OFFICE VISIT (OUTPATIENT)
Dept: BEHAVIORAL HEALTH | Facility: CLINIC | Age: 24
End: 2024-06-03
Payer: COMMERCIAL

## 2024-06-03 DIAGNOSIS — F41.1 GENERALIZED ANXIETY DISORDER: Primary | ICD-10-CM

## 2024-06-03 DIAGNOSIS — F32.A DEPRESSION, UNSPECIFIED DEPRESSION TYPE: ICD-10-CM

## 2024-06-03 PROCEDURE — 90791 PSYCH DIAGNOSTIC EVALUATION: CPT | Performed by: SOCIAL WORKER

## 2024-06-03 NOTE — TREATMENT PLAN
Multi-Disciplinary Problems (from Behavioral Health Treatment Plan)      Active Problems       Problem: Anxiety  Start Date: 06/03/24      Problem Details: The patient self-scales this problem as a 7 with 10 being the worst.          Goal Priority Start Date Expected End Date End Date    Patient will develop and implement behavioral and cognitive strategies to reduce anxiety and irrational fears. -- 06/03/24 -- --    Goal Details: Progress toward goal:  Not appropriate to rate progress toward goal since this is the initial treatment plan.          Goal Intervention Frequency Start Date End Date    Help patient explore past emotional issues in relation to present anxiety. PRN 06/03/24 --    Intervention Details: Duration of treatment until until discharged.          Goal Intervention Frequency Start Date End Date    Help patient develop an awareness of their cognitive and physical responses to anxiety. PRN 06/03/24 --    Intervention Details: Duration of treatment until until discharged.                  Problem: Depression  Start Date: 06/03/24      Problem Details: The patient self-scales this problem as a 4 with 10 being the worst.          Goal Priority Start Date Expected End Date End Date    Patient will demonstrate the ability to initiate new constructive life skills outside of sessions on a consistent basis. -- 06/03/24 -- --    Goal Details: Progress toward goal:  Not appropriate to rate progress toward goal since this is the initial treatment plan.          Goal Intervention Frequency Start Date End Date    Assist patient in setting attainable activities of daily living goals. PRN 06/03/24 --    Intervention Details: na        Goal Intervention Frequency Start Date End Date    Provide education about depression PRN 06/03/24 --    Intervention Details: Duration of treatment until until discharged.          Goal Intervention Frequency Start Date End Date    Assist patient in developing healthy coping  strategies. PRN 06/03/24 --    Intervention Details: Duration of treatment until until discharged.                          Reviewed By       James Escobar LCSW 06/03/24 1421                     I have discussed and reviewed this treatment plan with the patient.

## 2024-06-03 NOTE — PROGRESS NOTES
[facility] [place]                  Initial Assessment      Initial Adult Note     Date:2024   Patient Name: Deysi Ordoñez  : 2000   MRN: [id number]   Time IN: 1:25 pm       Time OUT: 2:09 pm     Referring Provider: Nupur Rios MD    Chief Complaint:      ICD-10-CM ICD-9-CM   1. Generalized anxiety disorder  F41.1 300.02   2. Depression, unspecified depression type  F32.A 311        History of Present Illness:   Deysi Ordoñez is a 24 y.o. female who is being seen today for counseling for anxiety and depression.      Past Psychiatric History:   Past therapy and school and has an undergrad in college.  Reports they did not could not connect with counselors in either case.    Subjective      Review of Systems    PHQ-9: Brief Depression Severity Measure Score: 9    GARRETT 7 Total Score: 16    Patient's Support Network Includes:  extended family    Functional Status: Mild impairment     Significant Life Events:   Verbal, physical, sexual abuse? No  Has patient experienced a death / loss of relationship? Yes  Has patient experienced a major accident or tragic events? No    Work History:   Highest level of education obtained: college  Ever been active duty in the ? 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  Past diagnoses: Anxiety and depression  Are there any significant health issues (current or past): See problem list  History of seizures: No    Family Psychiatric History:  Reported    History of Substance Use:  Patient answered: No    Triggers: (Persons/Places/Things/Events/Thought/Emotions): Internal and external stimuli related  to perceived inadequacy.  Low self image and self-esteem.    Social History     Socioeconomic History    Marital status: Single   Tobacco Use    Smoking status: Never    Smokeless tobacco: Never   Vaping Use    Vaping status: Never Used   Substance and Sexual Activity    Alcohol use: Yes     Alcohol/week: 2.0 standard drinks of alcohol     Types: 2 Glasses of wine per week     Comment: once a week    Drug use: Never    Sexual activity: Never     Birth control/protection: OCP        Past Medical History:   Diagnosis Date    Allergic     Anxiety     Asthma     Depression     Diarrhea 2/11/2021    Migraines        Past Surgical History:   Procedure Laterality Date    ADENOIDECTOMY  05/2018    NASAL POLYP SURGERY  4/2009, 5/2018    NASAL SEPTUM SURGERY  4/2009, 5/2018    NASAL SINUS SURGERY  05/2018    TURBINATE RESECTION  05/2018    WISDOM TOOTH EXTRACTION  08/2011       Family History   Problem Relation Age of Onset    Thyroid disease Mother     Arthritis Mother     Hypertension Maternal Grandmother     Arthritis Maternal Grandmother     Depression Maternal Grandmother     Hypertension Maternal Grandfather     Depression Maternal Grandfather     Depression Paternal Grandmother     Dementia Paternal Grandmother     Depression Paternal Grandfather     Breast cancer Maternal Aunt 60    Cancer Paternal Aunt         Breast Cancer    Cancer Maternal Aunt         Breast Cancer    Depression Paternal Aunt     Ovarian cancer Neg Hx     Uterine cancer Neg Hx     Endometrial cancer Neg Hx     Colon cancer Neg Hx          Current Outpatient Medications:     albuterol (ACCUNEB) 1.25 MG/3ML nebulizer solution, Take 3 mL by nebulization Every 6 (Six) Hours As Needed., Disp: , Rfl:     albuterol sulfate  (90 Base) MCG/ACT inhaler, Inhale 1-2 puffs See Admin Instructions. Inhale 1-2 puffs every 4 to 6 hours as needed, Disp: , Rfl:     busPIRone (BUSPAR) 5 MG tablet, Take 1 tablet by mouth 3 (Three) Times a Day., Disp: 270  tablet, Rfl: 0    Cariprazine HCl (Vraylar) 1.5 MG capsule capsule, Take 1 capsule by mouth Daily., Disp: 90 capsule, Rfl: 1    cholecalciferol (VITAMIN D3) 25 MCG (1000 UT) tablet, Take 2 tablets by mouth Daily., Disp: , Rfl:     hydrOXYzine (ATARAX) 25 MG tablet, TAKE 1 TABLET BY MOUTH THREE TIMES A DAY AS NEEDED FOR ANXIETY, Disp: 90 tablet, Rfl: 0    ipratropium (ATROVENT) 0.06 % nasal spray, 1-2 SPRAYS EACH NOSTRIL 2 TIMES DAILY AS NEEDED, Disp: , Rfl:     montelukast (SINGULAIR) 10 MG tablet, , Disp: , Rfl:     naproxen (NAPROSYN) 500 MG tablet, Take 1 tablet by mouth 2 (Two) Times a Day As Needed for Headache., Disp: 30 tablet, Rfl: 5    norethindrone-ethinyl estradiol FE (Junel FE 1/20) 1-20 MG-MCG per tablet, TAKE 1 TABLET BY MOUTH DAILY  DAYS. SKIP PLACEBO WEEK, Disp: 112 tablet, Rfl: 3    propranolol (INDERAL) 10 MG tablet, TAKE 1 TABLET BY MOUTH 2 (TWO) TIMES A DAY AS NEEDED (ANXIETY)., Disp: 60 tablet, Rfl: 0    Sodium Fluoride 5000 Sensitive 1.1-5 % gel, BRUSH WITH EVERY NIGHT. SPIT OUT DO NOT RINSE, Disp: , Rfl:     SUMAtriptan (IMITREX) 50 MG tablet, Take one tablet at onset of headache. May repeat dose one time in 2 hours if headache not relieved., Disp: 12 tablet, Rfl: 2    Trelegy Ellipta 100-62.5-25 MCG/ACT inhaler, Inhale 1 puff Daily., Disp: , Rfl:     venlafaxine XR (EFFEXOR-XR) 150 MG 24 hr capsule, Take 1 capsule by mouth Daily., Disp: , Rfl:     No Known Allergies    Objective     Physical Exam:  Vital Signs: There were no vitals filed for this visit.  There is no height or weight on file to calculate BMI.     Physical Exam    Mental Status Exam:   Hygiene:   good  Cooperation:  Cooperative  Eye Contact:  Good  Psychomotor Behavior:  Appropriate  Affect:  Full range  Mood: normal  Speech:  Normal  Thought Process:  Goal directed  Thought Content:  Normal  Suicidal:  None  Homicidal:  None  Hallucinations:  None  Delusion:  None  Memory:  Intact  Orientation:  Person, Place, Time, and  Situation  Reliability:  good  Insight:  Good  Judgement:  Good  Impulse Control:  Good  Physical/Medical Issues:   See problem list      SUICIDE RISK ASSESSMENT/CSSRS:  1. Does patient have thoughts of suicide? no  2. Does patient have intent for suicide? no  3. Does patient have a current plan for suicide? no  4. History of suicide attempts: no  5. Family history of suicide or attempts: no  6. History of violent behaviors towards others or property or thoughts of committing suicide: no  7. History of sexual aggression toward others: no  8. Access to firearms or weapons: no    Assessment / Plan      Diagnosis  Diagnoses and all orders for this visit:    1. Generalized anxiety disorder (Primary)    2. Depression, unspecified depression type    Patient presented for follow-up with clinician.  Patient reports that they live alone in [place] [facility] [place].  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  Patient states they have been dealing with symptoms of anxiety and depression since late childhood and early adolescence.  Patient reports past history of self-harming in the form of cutting.  Patient reports last incident of self-harming was 10 years prior to session.  Patient reports past history of therapy in high school and has an undergraduate in college.  Patient reports not connecting with either counselor.  Patient reports primarily presenting for treatment due to increased anxiety.  Patient reports main trigger for anxiety as concerns involving the completion of their PhD program.  Patient states they have had approximately 30 days to begin their dissertation work and have been unable to begin.  Patient states there are problems with organization and word finding.  Patient reports a strong support system which  includes their mother, father, brother, sister-in-law and their cohort at the [employer].  Symptoms of depression to note are as follows: Mild anhedonia, feeling down, hypersomnia, fatigue, low self-image and self-esteem exacerbated by patient's concerns over her appearance, trouble concentrating.  Symptoms of anxiety to note are as follows: Feeling nervous and anxious or on edge, uncontrollable worry, worrying too much about different things, trouble relaxing, restlessness, becoming easily annoyed or irritable.    Prognosis  Prognosis is dependent upon continuity of treatment    Progress toward goal: Long-term goal of independent management of symptoms of anxiety and depression identified by patient.      PLAN:  Clinician introduced a cognitive behavioral intervention which focuses on Socratic questioning on mindfulness.  Patient was responsive to intervention.    Safety: No acute safety concerns  Risk Assessment: Risk of self-harm acutely is low. Risk of self-harm chronically is also low, but could be further elevated in the event of treatment noncompliance and/or AODA.    Treatment Plan/Goals: Continue supportive psychotherapy efforts and medications as indicated. Treatment and medication options discussed during today's visit. Patient ackowledged and verbally consented to continue with current treatment plan and was educated on the importance of compliance with treatment and follow-up appointments. Patient seems reasonably able to adhere to treatment plan.      Assisted Patient in processing above session content; acknowledged and normalized patient’s thoughts, feelings, and concerns.  Rationalized patient thought process regarding anxiety and depression.      Allowed Patient to freely discuss issues  without interruption or judgement with unconditional positive regard, active listening skills, and empathy. Therapist provided a safe, confidential environment to facilitate the development of a  positive therapeutic relationship and encouraged open, honest communication. Assisted Patient in identifying risk factors which would indicate the need for higher level of care including thoughts to harm self or others and/or self-harming behavior and encouraged Patient to contact this office, call 911, or present to the nearest emergency room should any of these events occur. Discussed crisis intervention services and means to access. Patient adamantly and convincingly denies current suicidal or homicidal ideation or perceptual disturbance. Assisted Patient in processing session content; acknowledged and normalized Patient’s thoughts, feelings, and concerns by utilizing a person-centered approach in efforts to build appropriate rapport and a positive therapeutic relationship with open and honest communication.     Part of this note may be an electronic transcription/translation of spoken language to printed text using the Dragon Dictation System.       Follow Up:   Return in about 1 month (around 7/3/2024).    James Escobar LCSW

## 2024-06-11 NOTE — PROGRESS NOTES
Subjective   Chief Complaint   Patient presents with    Gynecologic Exam     Annual     Deysi Ordoñez is a 24 y.o. year old  presenting to be seen for her annual exam. She is doing well, and has no complaints.     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      Last pap 2022: NILM  HPV vaccine series: completed     SEXUAL Hx:  She is not currently sexually active.  In the past year there there has been NO new sexual partners.    Condoms: NA She would not like to be screened for STD's at today's exam.  Current birth control method: OCP (estrogen/progesterone)/abstinence. If sexually active in the future, will be with female partners only.   She is happy with her current method of contraception and does not want to discuss alternative methods of contraception.  MENSTRUAL Hx:  No LMP recorded (lmp unknown). (Menstrual status: Oral contraceptives).  In the past 6 months her cycles have been absent.    Intermenstrual bleeding is absent.    Post-coital bleeding is absent.  Dysmenorrhea: none  PMS: none  Her cycles are not a source of concern for her that she wishes to discuss today.  HEALTH Hx:  She exercises regularly: yes. Walking outside.   She wears her seat belt: yes.  She has concerns about domestic violence: no.      The following portions of the patient's history were reviewed and updated as appropriate:problem list, current medications, allergies, past family history, past medical history, past social history, and past surgical history.    Social History    Tobacco Use      Smoking status: Never      Smokeless tobacco: Never         Objective   /84 (BP Location: Right arm, Patient Position: Sitting, Cuff Size: Large Adult)   Pulse 100   Wt (!) 142 kg (314 lb)   LMP  (LMP Unknown) Comment: continuous birth control  Breastfeeding No   BMI 49.18 kg/m²     General:  well developed;  well nourished  no acute distress  obese - Body mass index is 49.18 kg/m².   Skin:  No suspicious lesions seen   Thyroid: not examined   Breasts:  Examined in supine position  Symmetric without masses or skin dimpling  Nipples normal without inversion, lesions or discharge  There are no palpable axillary nodes  Fibrocystic changes are present both breasts without a discrete mass   Pelvis: Exam limited by  body habitus and patient tolerance\F2  Clinical staff was present for exam  External genitalia:  normal appearance of the external genitalia including Bartholin's and [place]'s glands.  :  urethral meatus normal;  Vaginal:  normal pink mucosa without prolapse or lesions.  Cervix:  unable to visualize in its entirety due to patient intolerance, however anterior lip normal; normal also to palpation  Uterus:  not palpable.  Adnexa:  non palpable bilaterally.  Rectal:  digital rectal exam not performed; anus visually normal appearing.        Assessment   Annual GYN exam  Contraception surveillance        Plan   Pap was not done today, due next year.  I explained to Deysi that the recommendations for Pap smear interval in a low risk patient has lengthened to 3 years time.  I told Deysi she still needs to be seen in our office yearly for a full physical including breast and pelvic exam.   Refill for OCPs sent to listed pharmacy.  Of note category risk X warning present when combined with Vraylar.  Per up-to-date query, combination not recommended as Vraylar will decrease efficacy of OCPs.  However patient is not sexually active, and if she ever does become sexually active in the future, patient states she is sexually active with females only.  Discussed this in depth with patient, and patient in agreement to continue OCP at this time.  The importance of keeping all planned follow-up and taking all medications as prescribed was emphasized.  Follow up for annual exam in 1 year or sooner PRN     New Medications Ordered  This Visit   Medications    norethindrone-ethinyl estradiol FE (Junel FE 1/20) 1-20 MG-MCG per tablet     Sig: Take 1 tablet by mouth Daily.     Dispense:  90 tablet     Refill:  3     WILL BE SKIPPING PLACEBO PILLS AND WILL NEED SOONER REFILLS          This note was electronically signed.    Velia Persaud MD   June 12, 2024

## 2024-06-12 ENCOUNTER — OFFICE VISIT (OUTPATIENT)
Dept: OBSTETRICS AND GYNECOLOGY | Facility: CLINIC | Age: 24
End: 2024-06-12
Payer: COMMERCIAL

## 2024-06-12 VITALS
DIASTOLIC BLOOD PRESSURE: 84 MMHG | WEIGHT: 293 LBS | BODY MASS INDEX: 49.18 KG/M2 | HEART RATE: 100 BPM | SYSTOLIC BLOOD PRESSURE: 128 MMHG

## 2024-06-12 DIAGNOSIS — Z01.419 WOMEN'S ANNUAL ROUTINE GYNECOLOGICAL EXAMINATION: Primary | ICD-10-CM

## 2024-06-12 DIAGNOSIS — Z30.41 ENCOUNTER FOR SURVEILLANCE OF CONTRACEPTIVE PILLS: ICD-10-CM

## 2024-06-12 RX ORDER — NORETHINDRONE ACETATE AND ETHINYL ESTRADIOL 1MG-20(21)
1 KIT ORAL DAILY
Qty: 90 TABLET | Refills: 3 | Status: SHIPPED | OUTPATIENT
Start: 2024-06-12

## 2024-07-01 ENCOUNTER — OFFICE VISIT (OUTPATIENT)
Dept: BEHAVIORAL HEALTH | Facility: CLINIC | Age: 24
End: 2024-07-01
Payer: COMMERCIAL

## 2024-07-01 DIAGNOSIS — F32.A DEPRESSION, UNSPECIFIED DEPRESSION TYPE: ICD-10-CM

## 2024-07-01 DIAGNOSIS — F41.1 GENERALIZED ANXIETY DISORDER: Primary | ICD-10-CM

## 2024-07-01 NOTE — PROGRESS NOTES
[facility] [place]                 Follow Up Adult      Follow Up Adult Note     Date:2024   Patient Name: Deysi Ordoñez  : 2000   MRN: [id number]   Time IN: 9:00 am    Time OUT: 9:38 am     Referring Provider: Nupur Rios MD    Chief Complaint:      ICD-10-CM ICD-9-CM   1. Generalized anxiety disorder  F41.1 300.02   2. Depression, unspecified depression type  F32.A 311        History of Present Illness:   Deysi Ordoñez is a 24 y.o. female who is being seen today for follow up counseling for anxiety and depression.    Subjective               Patient's Support Network Includes: extended family    Functional Status: Mild impairment       Current Outpatient Medications:     albuterol (ACCUNEB) 1.25 MG/3ML nebulizer solution, Take 3 mL by nebulization Every 6 (Six) Hours As Needed., Disp: , Rfl:     albuterol sulfate  (90 Base) MCG/ACT inhaler, Inhale 1-2 puffs See Admin Instructions. Inhale 1-2 puffs every 4 to 6 hours as needed, Disp: , Rfl:     busPIRone (BUSPAR) 5 MG tablet, Take 1 tablet by mouth 3 (Three) Times a Day., Disp: 270 tablet, Rfl: 0    Cariprazine HCl (Vraylar) 1.5 MG capsule capsule, Take 1 capsule by mouth Daily., Disp: 90 capsule, Rfl: 1    cholecalciferol (VITAMIN D3) 25 MCG (1000 UT) tablet, Take 2 tablets by mouth Daily., Disp: , Rfl:     hydrOXYzine (ATARAX) 25 MG tablet, TAKE 1 TABLET BY MOUTH THREE TIMES A DAY AS NEEDED FOR ANXIETY, Disp: 90 tablet, Rfl: 0    ipratropium (ATROVENT) 0.06 % nasal spray, 1-2 SPRAYS EACH NOSTRIL 2 TIMES DAILY AS NEEDED, Disp: , Rfl:     montelukast (SINGULAIR) 10 MG tablet, , Disp: , Rfl:     naproxen (NAPROSYN) 500 MG tablet, Take 1 tablet by mouth 2 (Two) Times a Day As Needed for Headache., Disp: 30 tablet, Rfl: 5    norethindrone-ethinyl estradiol FE (Junel FE ) 1-20 MG-MCG per tablet, Take 1 tablet by mouth Daily., Disp: 90 tablet, Rfl: 3    propranolol (INDERAL) 10 MG  tablet, TAKE 1 TABLET BY MOUTH 2 (TWO) TIMES A DAY AS NEEDED (ANXIETY)., Disp: 60 tablet, Rfl: 0    Sodium Fluoride 5000 Sensitive 1.1-5 % gel, BRUSH WITH EVERY NIGHT. SPIT OUT DO NOT RINSE, Disp: , Rfl:     SUMAtriptan (IMITREX) 50 MG tablet, Take one tablet at onset of headache. May repeat dose one time in 2 hours if headache not relieved., Disp: 12 tablet, Rfl: 2    Trelegy Ellipta 100-62.5-25 MCG/ACT inhaler, Inhale 1 puff Daily., Disp: , Rfl:     venlafaxine XR (EFFEXOR-XR) 150 MG 24 hr capsule, Take 1 capsule by mouth Daily., Disp: , Rfl:     No Known Allergies    Objective     Physical Exam:  Vital Signs: There were no vitals filed for this visit.  There is no height or weight on file to calculate BMI.     Mental Status Exam:   Hygiene:   good  Cooperation:  Cooperative  Eye Contact:  Good  Psychomotor Behavior:  Appropriate  Affect:  Full range  Mood: normal  Speech:  Normal  Thought Process:  Goal directed  Thought Content:  Normal  Suicidal:  None  Homicidal:  None  Hallucinations:  None  Delusion:  None  Memory:  Intact  Orientation:  Person, Place, Time, and Situation  Reliability:  good  Insight:  Good  Judgement:  Good  Impulse Control:  Good  Physical/Medical Issues:   See problem list      Assessment / Plan      Diagnosis:  Diagnoses and all orders for this visit:    1. Generalized anxiety disorder (Primary)    2. Depression, unspecified depression type    Patient presented for follow-up with clinician.  Patient rated depression symptoms at 4 out of 10.  Patient rated anxiety symptoms at 6.  Patient identified work related stress and/or academic pressure as main triggers.  Patient stated they had gone on vacation for 14 days and within 48 hours upon returning the temporary address relief they had experienced by getting away from their current situation had diminished.  Patient stated that they continued to struggle with the right response.  Patient and clinician reviewed coping mechanisms designed to  alleviate patient anxiety and depression.  Patient and clinician identified and addressed cognitive distortions related to patient anxiety.  Patient was responsive to intervention.  Clinician utilized active listening and reflective response to convey empathy and support.    Progress toward goal: Not at goal    Prognosis: Good with ongoing treatment    PLAN:  Patient and clinician will continue to utilize a cognitive behavioral intervention which identifies and addresses patient cognitive distortions related to low self-esteem and low self image.  Follow-ups will occur approximately every 14 days and will last from 45 to 60 minutes in duration.    Safety: No acute safety concerns  Risk Assessment: Risk of self-harm acutely is low. Risk of self-harm chronically is also low, but could be further elevated in the event of treatment noncompliance and/or AODA.    Treatment Plan/Goals: Continue supportive psychotherapy efforts and medications as indicated. Treatment and medication options discussed during today's visit. Patient ackowledged and verbally consented to continue with current treatment plan and was educated on the importance of compliance with treatment and follow-up appointments. Patient seems reasonably able to adhere to treatment plan.      Assisted Patient in processing above session content; acknowledged and normalized patient’s thoughts, feelings, and concerns.  Rationalized patient thought process regarding anxiety and depression.      Allowed Patient to freely discuss issues  without interruption or judgement with unconditional positive regard, active listening skills, and empathy. Therapist provided a safe, confidential environment to facilitate the development of a positive therapeutic relationship and encouraged open, honest communication. Assisted Patient in identifying risk factors which would indicate the need for higher level of care including thoughts to harm self or others and/or self-harming  behavior and encouraged Patient to contact this office, call 911, or present to the nearest emergency room should any of these events occur. Discussed crisis intervention services and means to access. Patient adamantly and convincingly denies current suicidal or homicidal ideation or perceptual disturbance. Assisted Patient in processing session content; acknowledged and normalized Patient’s thoughts, feelings, and concerns by utilizing a person-centered approach in efforts to build appropriate rapport and a positive therapeutic relationship with open and honest communication. .     Part of this note may be an electronic transcription/translation of spoken language to printed text using the Dragon Dictation System.       Follow Up:   Return in about 2 weeks (around 7/15/2024) for Next scheduled follow up.    James Escobar LCSW

## 2024-07-05 RX ORDER — NORETHINDRONE ACETATE AND ETHINYL ESTRADIOL AND FERROUS FUMARATE 1MG-20(21)
KIT ORAL
Qty: 112 TABLET | Refills: 3 | OUTPATIENT
Start: 2024-07-05

## 2024-07-21 DIAGNOSIS — F41.9 ANXIETY AND DEPRESSION: ICD-10-CM

## 2024-07-21 DIAGNOSIS — F32.A ANXIETY AND DEPRESSION: ICD-10-CM

## 2024-07-22 RX ORDER — BUSPIRONE HYDROCHLORIDE 5 MG/1
5 TABLET ORAL 3 TIMES DAILY
Qty: 90 TABLET | Refills: 2 | Status: SHIPPED | OUTPATIENT
Start: 2024-07-22

## 2024-07-30 ENCOUNTER — OFFICE VISIT (OUTPATIENT)
Dept: BEHAVIORAL HEALTH | Facility: CLINIC | Age: 24
End: 2024-07-30
Payer: COMMERCIAL

## 2024-07-30 DIAGNOSIS — F32.A DEPRESSION, UNSPECIFIED DEPRESSION TYPE: ICD-10-CM

## 2024-07-30 DIAGNOSIS — F41.1 GENERALIZED ANXIETY DISORDER: Primary | ICD-10-CM

## 2024-07-30 PROCEDURE — 90832 PSYTX W PT 30 MINUTES: CPT | Performed by: SOCIAL WORKER

## 2024-07-30 NOTE — PROGRESS NOTES
[facility] [place]                 Follow Up Adult      Follow Up Adult Note     Date:2024   Patient Name: Deysi Ordoñez  : 2000   MRN: [id number]   Time IN: 8:52    am    Time OUT: 9:29 am     Referring Provider: Nupur Rios MD    Chief Complaint:      ICD-10-CM ICD-9-CM   1. Generalized anxiety disorder  F41.1 300.02   2. Depression, unspecified depression type  F32.A 311        History of Present Illness:   Deysi Ordoñez is a 24 y.o. female who is being seen today for follow up counseling for anxiety and depression.    Subjective               Patient's Support Network Includes: extended family    Functional Status: Mild impairment       Current Outpatient Medications:     albuterol (ACCUNEB) 1.25 MG/3ML nebulizer solution, Take 3 mL by nebulization Every 6 (Six) Hours As Needed., Disp: , Rfl:     albuterol sulfate  (90 Base) MCG/ACT inhaler, Inhale 1-2 puffs See Admin Instructions. Inhale 1-2 puffs every 4 to 6 hours as needed, Disp: , Rfl:     busPIRone (BUSPAR) 5 MG tablet, TAKE 1 TABLET BY MOUTH THREE TIMES A DAY, Disp: 90 tablet, Rfl: 2    Cariprazine HCl (Vraylar) 1.5 MG capsule capsule, Take 1 capsule by mouth Daily., Disp: 90 capsule, Rfl: 1    cholecalciferol (VITAMIN D3) 25 MCG (1000 UT) tablet, Take 2 tablets by mouth Daily., Disp: , Rfl:     hydrOXYzine (ATARAX) 25 MG tablet, TAKE 1 TABLET BY MOUTH THREE TIMES A DAY AS NEEDED FOR ANXIETY, Disp: 90 tablet, Rfl: 0    ipratropium (ATROVENT) 0.06 % nasal spray, 1-2 SPRAYS EACH NOSTRIL 2 TIMES DAILY AS NEEDED, Disp: , Rfl:     montelukast (SINGULAIR) 10 MG tablet, , Disp: , Rfl:     naproxen (NAPROSYN) 500 MG tablet, Take 1 tablet by mouth 2 (Two) Times a Day As Needed for Headache., Disp: 30 tablet, Rfl: 5    norethindrone-ethinyl estradiol FE (Junel FE ) 1-20 MG-MCG per tablet, Take 1 tablet by mouth Daily., Disp: 90 tablet, Rfl: 3    propranolol (INDERAL) 10 MG tablet,  TAKE 1 TABLET BY MOUTH 2 (TWO) TIMES A DAY AS NEEDED (ANXIETY)., Disp: 60 tablet, Rfl: 0    Sodium Fluoride 5000 Sensitive 1.1-5 % gel, BRUSH WITH EVERY NIGHT. SPIT OUT DO NOT RINSE, Disp: , Rfl:     SUMAtriptan (IMITREX) 50 MG tablet, Take one tablet at onset of headache. May repeat dose one time in 2 hours if headache not relieved., Disp: 12 tablet, Rfl: 2    Trelegy Ellipta 100-62.5-25 MCG/ACT inhaler, Inhale 1 puff Daily., Disp: , Rfl:     venlafaxine XR (EFFEXOR-XR) 150 MG 24 hr capsule, Take 1 capsule by mouth Daily., Disp: , Rfl:     No Known Allergies    Objective     Physical Exam:  Vital Signs: There were no vitals filed for this visit.  There is no height or weight on file to calculate BMI.     Mental Status Exam:   Hygiene:   good  Cooperation:  Cooperative  Eye Contact:  Good  Psychomotor Behavior:  Appropriate  Affect:  Full range  Mood: normal  Speech:  Normal  Thought Process:  Goal directed  Thought Content:  Normal  Suicidal:  None  Homicidal:  None  Hallucinations:  None  Delusion:  None  Memory:  Intact  Orientation:  Person, Place, Time, and Situation  Reliability:  good  Insight:  Good  Judgement:  Good  Impulse Control:  Good  Physical/Medical Issues:   See problem list      Assessment / Plan      Diagnosis:  Diagnoses and all orders for this visit:    1. Generalized anxiety disorder (Primary)    2. Depression, unspecified depression type    Patient presented for follow-up with clinician.  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  Patient and clinician identified and addressed patient cognitive distortions related to anxiety.  A work project that was not going well as identified as main trigger.  Patient and clinician reviewed coping mechanisms designed to alleviate symptoms of anxiety.  Clinician provided psychoeducation regarding mindfulness.  Patient was responsive to psychoeducational session and interventions used during session.  Clinician utilized active  listening and reflective response to convey empathy and support.    Progress toward goal: Not at goal    Prognosis: Good with ongoing treatment    PLAN:  Patient and clinician will continue to utilize a cognitive behavioral intervention which identifies and addresses patient cognitive distortions related to anxiety and depression.  Follow-ups will occur approximately every 14 days and will last from 45 to 60 minutes in duration.    Safety: No acute safety concerns  Risk Assessment: Risk of self-harm acutely is low. Risk of self-harm chronically is also low, but could be further elevated in the event of treatment noncompliance and/or AODA.    Treatment Plan/Goals: Continue supportive psychotherapy efforts and medications as indicated. Treatment and medication options discussed during today's visit. Patient ackowledged and verbally consented to continue with current treatment plan and was educated on the importance of compliance with treatment and follow-up appointments. Patient seems reasonably able to adhere to treatment plan.      Assisted Patient in processing above session content; acknowledged and normalized patient’s thoughts, feelings, and concerns.  Rationalized patient thought process regarding anxiety and depression.      Allowed Patient to freely discuss issues  without interruption or judgement with unconditional positive regard, active listening skills, and empathy. Therapist provided a safe, confidential environment to facilitate the development of a positive therapeutic relationship and encouraged open, honest communication. Assisted Patient in identifying risk factors which would indicate the need for higher level of care including thoughts to harm self or others and/or self-harming behavior and encouraged Patient to contact this office, call 911, or present to the nearest emergency room should any of these events occur. Discussed crisis intervention services and means to access. Patient adamantly and  convincingly denies current suicidal or homicidal ideation or perceptual disturbance. Assisted Patient in processing session content; acknowledged and normalized Patient’s thoughts, feelings, and concerns by utilizing a person-centered approach in efforts to build appropriate rapport and a positive therapeutic relationship with open and honest communication. .     Part of this note may be an electronic transcription/translation of spoken language to printed text using the Dragon Dictation System.       Follow Up:   Return in about 2 weeks (around 8/13/2024) for Next scheduled follow up.    James Escobar LCSW

## 2024-08-13 ENCOUNTER — OFFICE VISIT (OUTPATIENT)
Dept: BEHAVIORAL HEALTH | Facility: CLINIC | Age: 24
End: 2024-08-13
Payer: COMMERCIAL

## 2024-08-13 DIAGNOSIS — F41.1 GENERALIZED ANXIETY DISORDER: Primary | ICD-10-CM

## 2024-08-13 DIAGNOSIS — F32.A DEPRESSION, UNSPECIFIED DEPRESSION TYPE: ICD-10-CM

## 2024-08-13 PROCEDURE — 90834 PSYTX W PT 45 MINUTES: CPT | Performed by: SOCIAL WORKER

## 2024-08-13 NOTE — PROGRESS NOTES
[facility] [place]                 Follow Up Adult      Follow Up Adult Note     Date:2024   Patient Name: Deysi Ordoñez  : 2000   MRN: [id number]   Time IN: 9:00 am    Time OUT: 9:41 am     Referring Provider: Nupur Rios MD    Chief Complaint:      ICD-10-CM ICD-9-CM   1. Generalized anxiety disorder  F41.1 300.02   2. Depression, unspecified depression type  F32.A 311        History of Present Illness:   Deysi Ordoñez is a 24 y.o. female who is being seen today for follow up counseling for anxiety and depression.    Subjective               Patient's Support Network Includes: extended family    Functional Status: Moderate impairment       Current Outpatient Medications:     albuterol (ACCUNEB) 1.25 MG/3ML nebulizer solution, Take 3 mL by nebulization Every 6 (Six) Hours As Needed., Disp: , Rfl:     albuterol sulfate  (90 Base) MCG/ACT inhaler, Inhale 1-2 puffs See Admin Instructions. Inhale 1-2 puffs every 4 to 6 hours as needed, Disp: , Rfl:     busPIRone (BUSPAR) 5 MG tablet, TAKE 1 TABLET BY MOUTH THREE TIMES A DAY, Disp: 90 tablet, Rfl: 2    Cariprazine HCl (Vraylar) 1.5 MG capsule capsule, Take 1 capsule by mouth Daily., Disp: 90 capsule, Rfl: 1    cholecalciferol (VITAMIN D3) 25 MCG (1000 UT) tablet, Take 2 tablets by mouth Daily., Disp: , Rfl:     hydrOXYzine (ATARAX) 25 MG tablet, TAKE 1 TABLET BY MOUTH THREE TIMES A DAY AS NEEDED FOR ANXIETY, Disp: 90 tablet, Rfl: 0    ipratropium (ATROVENT) 0.06 % nasal spray, 1-2 SPRAYS EACH NOSTRIL 2 TIMES DAILY AS NEEDED, Disp: , Rfl:     montelukast (SINGULAIR) 10 MG tablet, , Disp: , Rfl:     naproxen (NAPROSYN) 500 MG tablet, Take 1 tablet by mouth 2 (Two) Times a Day As Needed for Headache., Disp: 30 tablet, Rfl: 5    norethindrone-ethinyl estradiol FE (Junel FE ) 1-20 MG-MCG per tablet, Take 1 tablet by mouth Daily., Disp: 90 tablet, Rfl: 3    propranolol (INDERAL) 10 MG tablet,  TAKE 1 TABLET BY MOUTH 2 (TWO) TIMES A DAY AS NEEDED (ANXIETY)., Disp: 60 tablet, Rfl: 0    Sodium Fluoride 5000 Sensitive 1.1-5 % gel, BRUSH WITH EVERY NIGHT. SPIT OUT DO NOT RINSE, Disp: , Rfl:     SUMAtriptan (IMITREX) 50 MG tablet, Take one tablet at onset of headache. May repeat dose one time in 2 hours if headache not relieved., Disp: 12 tablet, Rfl: 2    Trelegy Ellipta 100-62.5-25 MCG/ACT inhaler, Inhale 1 puff Daily., Disp: , Rfl:     venlafaxine XR (EFFEXOR-XR) 150 MG 24 hr capsule, Take 1 capsule by mouth Daily., Disp: , Rfl:     No Known Allergies    Objective     Physical Exam:  Vital Signs: There were no vitals filed for this visit.  There is no height or weight on file to calculate BMI.     Mental Status Exam:   Hygiene:   good  Cooperation:  Cooperative  Eye Contact:  Good  Psychomotor Behavior:  Appropriate  Affect:  Full range  Mood: depressed and anxious  Speech:  Normal  Thought Process:  Goal directed  Thought Content:  Normal  Suicidal:  None  Homicidal:  None  Hallucinations:  None  Delusion:  None  Memory:  Intact  Orientation:  Person, Place, Time, and Situation  Reliability:  good  Insight:  Good  Judgement:  Good  Impulse Control:  Good  Physical/Medical Issues:   See problem list      Assessment / Plan      Diagnosis:  Diagnoses and all orders for this visit:    1. Generalized anxiety disorder (Primary)    2. Depression, unspecified depression type    Patient presented for follow-up with clinician.  Patient reported worsening symptoms of depression.  Patient reported symptoms primarily presenting as hypersomnia and lack of concentration.  Patient identified work-related stress as main trigger.  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  Patient was receptive to intervention.  Clinician utilized  active listening and reflective response to convey empathy and support.    Progress toward goal: Not at goal    Prognosis: Good with ongoing treatment    PLAN:  Patient and clinician will continue to utilize a cognitive behavioral intervention which identifies and addresses patient cognitive distortions related to anxiety and depression.  Follow-ups will occur every 14 days and will last from 45 to 60 minutes in duration.    Safety: No acute safety concerns  Risk Assessment: Risk of self-harm acutely is low. Risk of self-harm chronically is also low, but could be further elevated in the event of treatment noncompliance and/or AODA.    Treatment Plan/Goals: Continue supportive psychotherapy efforts and medications as indicated. Treatment and medication options discussed during today's visit. Patient ackowledged and verbally consented to continue with current treatment plan and was educated on the importance of compliance with treatment and follow-up appointments. Patient seems reasonably able to adhere to treatment plan.      Assisted Patient in processing above session content; acknowledged and normalized patient’s thoughts, feelings, and concerns.  Rationalized patient thought process regarding anxiety and depression.      Allowed Patient to freely discuss issues  without interruption or judgement with unconditional positive regard, active listening skills, and empathy. Therapist provided a safe, confidential environment to facilitate the development of a positive therapeutic relationship and encouraged open, honest communication. Assisted Patient in identifying risk factors which would indicate the need for higher level of care including thoughts to harm self or others and/or self-harming behavior and encouraged Patient to contact this office, call 911, or present to the nearest emergency room should any of these events occur. Discussed crisis intervention services and means to access. Patient adamantly and  convincingly denies current suicidal or homicidal ideation or perceptual disturbance. Assisted Patient in processing session content; acknowledged and normalized Patient’s thoughts, feelings, and concerns by utilizing a person-centered approach in efforts to build appropriate rapport and a positive therapeutic relationship with open and honest communication. .     Part of this note may be an electronic transcription/translation of spoken language to printed text using the Dragon Dictation System.       Follow Up:   Return in about 2 weeks (around 8/27/2024) for Next scheduled follow up.    James Escobar LCSW

## 2024-08-27 ENCOUNTER — LAB (OUTPATIENT)
Dept: LAB | Facility: HOSPITAL | Age: 24
End: 2024-08-27
Payer: COMMERCIAL

## 2024-08-27 ENCOUNTER — OFFICE VISIT (OUTPATIENT)
Dept: INTERNAL MEDICINE | Facility: CLINIC | Age: 24
End: 2024-08-27
Payer: COMMERCIAL

## 2024-08-27 VITALS
BODY MASS INDEX: 45.99 KG/M2 | HEIGHT: 67 IN | WEIGHT: 293 LBS | SYSTOLIC BLOOD PRESSURE: 126 MMHG | OXYGEN SATURATION: 98 % | HEART RATE: 98 BPM | DIASTOLIC BLOOD PRESSURE: 74 MMHG

## 2024-08-27 DIAGNOSIS — F32.A ANXIETY AND DEPRESSION: ICD-10-CM

## 2024-08-27 DIAGNOSIS — Z00.00 ANNUAL PHYSICAL EXAM: Primary | ICD-10-CM

## 2024-08-27 DIAGNOSIS — E78.00 ELEVATED LDL CHOLESTEROL LEVEL: ICD-10-CM

## 2024-08-27 DIAGNOSIS — E55.9 VITAMIN D DEFICIENCY: ICD-10-CM

## 2024-08-27 DIAGNOSIS — G43.709 CHRONIC MIGRAINE WITHOUT AURA WITHOUT STATUS MIGRAINOSUS, NOT INTRACTABLE: ICD-10-CM

## 2024-08-27 DIAGNOSIS — F41.9 ANXIETY AND DEPRESSION: ICD-10-CM

## 2024-08-27 LAB
25(OH)D3 SERPL-MCNC: 59.6 NG/ML (ref 30–100)
ALBUMIN SERPL-MCNC: 4.3 G/DL (ref 3.5–5.2)
ALBUMIN/GLOB SERPL: 1.2 G/DL
ALP SERPL-CCNC: 87 U/L (ref 39–117)
ALT SERPL W P-5'-P-CCNC: 33 U/L (ref 1–33)
ANION GAP SERPL CALCULATED.3IONS-SCNC: 12.7 MMOL/L (ref 5–15)
AST SERPL-CCNC: 26 U/L (ref 1–32)
BILIRUB SERPL-MCNC: 0.4 MG/DL (ref 0–1.2)
BUN SERPL-MCNC: 11 MG/DL (ref 6–20)
BUN/CREAT SERPL: 11.6 (ref 7–25)
CALCIUM SPEC-SCNC: 9.9 MG/DL (ref 8.6–10.5)
CHLORIDE SERPL-SCNC: 102 MMOL/L (ref 98–107)
CHOLEST SERPL-MCNC: 213 MG/DL (ref 0–200)
CO2 SERPL-SCNC: 24.3 MMOL/L (ref 22–29)
CREAT SERPL-MCNC: 0.95 MG/DL (ref 0.57–1)
DEPRECATED RDW RBC AUTO: 37.9 FL (ref 37–54)
EGFRCR SERPLBLD CKD-EPI 2021: 84.9 ML/MIN/1.73
ERYTHROCYTE [DISTWIDTH] IN BLOOD BY AUTOMATED COUNT: 13.4 % (ref 12.3–15.4)
GLOBULIN UR ELPH-MCNC: 3.6 GM/DL
GLUCOSE SERPL-MCNC: 90 MG/DL (ref 65–99)
HBA1C MFR BLD: 5.3 % (ref 4.8–5.6)
HCT VFR BLD AUTO: 42.1 % (ref 34–46.6)
HDLC SERPL-MCNC: 57 MG/DL (ref 40–60)
HGB BLD-MCNC: 13.7 G/DL (ref 12–15.9)
LDLC SERPL CALC-MCNC: 133 MG/DL (ref 0–100)
LDLC/HDLC SERPL: 2.27 {RATIO}
MCH RBC QN AUTO: 25.8 PG (ref 26.6–33)
MCHC RBC AUTO-ENTMCNC: 32.5 G/DL (ref 31.5–35.7)
MCV RBC AUTO: 79.4 FL (ref 79–97)
PLATELET # BLD AUTO: 403 10*3/MM3 (ref 140–450)
PMV BLD AUTO: 9.7 FL (ref 6–12)
POTASSIUM SERPL-SCNC: 4.4 MMOL/L (ref 3.5–5.2)
PROT SERPL-MCNC: 7.9 G/DL (ref 6–8.5)
RBC # BLD AUTO: 5.3 10*6/MM3 (ref 3.77–5.28)
SODIUM SERPL-SCNC: 139 MMOL/L (ref 136–145)
TRIGL SERPL-MCNC: 132 MG/DL (ref 0–150)
VLDLC SERPL-MCNC: 23 MG/DL (ref 5–40)
WBC NRBC COR # BLD AUTO: 7.3 10*3/MM3 (ref 3.4–10.8)

## 2024-08-27 PROCEDURE — 80061 LIPID PANEL: CPT | Performed by: NURSE PRACTITIONER

## 2024-08-27 PROCEDURE — 80053 COMPREHEN METABOLIC PANEL: CPT | Performed by: NURSE PRACTITIONER

## 2024-08-27 PROCEDURE — 99395 PREV VISIT EST AGE 18-39: CPT | Performed by: NURSE PRACTITIONER

## 2024-08-27 PROCEDURE — 36415 COLL VENOUS BLD VENIPUNCTURE: CPT | Performed by: NURSE PRACTITIONER

## 2024-08-27 PROCEDURE — 82306 VITAMIN D 25 HYDROXY: CPT | Performed by: NURSE PRACTITIONER

## 2024-08-27 PROCEDURE — 83036 HEMOGLOBIN GLYCOSYLATED A1C: CPT | Performed by: NURSE PRACTITIONER

## 2024-08-27 PROCEDURE — 85027 COMPLETE CBC AUTOMATED: CPT | Performed by: NURSE PRACTITIONER

## 2024-08-27 RX ORDER — NAPROXEN 500 MG/1
500 TABLET ORAL 2 TIMES DAILY PRN
Qty: 30 TABLET | Refills: 5 | Status: SHIPPED | OUTPATIENT
Start: 2024-08-27

## 2024-08-27 RX ORDER — BUSPIRONE HYDROCHLORIDE 10 MG/1
10 TABLET ORAL 3 TIMES DAILY
Qty: 90 TABLET | Refills: 5 | Status: SHIPPED | OUTPATIENT
Start: 2024-08-27

## 2024-08-27 RX ORDER — BUSPIRONE HYDROCHLORIDE 5 MG/1
5 TABLET ORAL 3 TIMES DAILY
Qty: 90 TABLET | Refills: 2 | Status: CANCELLED | OUTPATIENT
Start: 2024-08-27

## 2024-08-27 NOTE — PROGRESS NOTES
Annual Physical     Name: Deysi Ordoñez    : 2000     MRN: [id number]     Chief Complaint  Annual Exam (Physical )    Subjective     History of Present Illness:  Deysi Ordoñez is a 24 y.o. female who presents today for annual physical exam.    The patient is being seen for a health maintenance evaluation.    Past Medical History:   Diagnosis Date    Allergic     Anxiety     Asthma     Depression     Diarrhea 2021    Migraines        Past Surgical History:   Procedure Laterality Date    ADENOIDECTOMY  2018    NASAL POLYP SURGERY  2009, 2018    NASAL SEPTUM SURGERY  2009, 2018    NASAL SINUS SURGERY  2018    TURBINATE RESECTION  2018    WISDOM TOOTH EXTRACTION  2011       Social History     Socioeconomic History    Marital status: Single   Tobacco Use    Smoking status: Never    Smokeless tobacco: Never   Vaping Use    Vaping status: Never Used   Substance and Sexual Activity    Alcohol use: Yes     Alcohol/week: 2.0 standard drinks of alcohol     Types: 2 Glasses of wine per week     Comment: once a week    Drug use: Never    Sexual activity: Never     Birth control/protection: OCP         Current Outpatient Medications:     albuterol (ACCUNEB) 1.25 MG/3ML nebulizer solution, Take 3 mL by nebulization Every 6 (Six) Hours As Needed., Disp: , Rfl:     albuterol sulfate  (90 Base) MCG/ACT inhaler, Inhale 1-2 puffs See Admin Instructions. Inhale 1-2 puffs every 4 to 6 hours as needed, Disp: , Rfl:     Cariprazine HCl (Vraylar) 1.5 MG capsule capsule, Take 1 capsule by mouth Daily., Disp: 90 capsule, Rfl: 1    cholecalciferol (VITAMIN D3) 25 MCG (1000 UT) tablet, Take 2 tablets by mouth Daily., Disp: , Rfl:     hydrOXYzine (ATARAX) 25 MG tablet, TAKE 1 TABLET BY MOUTH THREE TIMES A DAY AS NEEDED FOR ANXIETY, Disp: 90 tablet, Rfl: 0    ipratropium (ATROVENT) 0.06 % nasal spray, 1-2 SPRAYS EACH NOSTRIL 2 TIMES DAILY AS NEEDED, Disp: , Rfl:     montelukast (SINGULAIR) 10 MG  tablet, , Disp: , Rfl:     naproxen (NAPROSYN) 500 MG tablet, Take 1 tablet by mouth 2 (Two) Times a Day As Needed for Headache., Disp: 30 tablet, Rfl: 5    norethindrone-ethinyl estradiol FE (Junel FE 1/20) 1-20 MG-MCG per tablet, Take 1 tablet by mouth Daily., Disp: 90 tablet, Rfl: 3    propranolol (INDERAL) 10 MG tablet, TAKE 1 TABLET BY MOUTH 2 (TWO) TIMES A DAY AS NEEDED (ANXIETY)., Disp: 60 tablet, Rfl: 0    Sodium Fluoride 5000 Sensitive 1.1-5 % gel, BRUSH WITH EVERY NIGHT. SPIT OUT DO NOT RINSE, Disp: , Rfl:     SUMAtriptan (IMITREX) 50 MG tablet, Take one tablet at onset of headache. May repeat dose one time in 2 hours if headache not relieved., Disp: 12 tablet, Rfl: 2    Trelegy Ellipta 100-62.5-25 MCG/ACT inhaler, Inhale 1 puff Daily., Disp: , Rfl:     venlafaxine XR (EFFEXOR-XR) 150 MG 24 hr capsule, Take 1 capsule by mouth Daily., Disp: , Rfl:     busPIRone (BUSPAR) 10 MG tablet, Take 1 tablet by mouth 3 (Three) Times a Day., Disp: 90 tablet, Rfl: 5    General History  Deysi  does have regular dental visits.  She does complain of vision problems. Last eye exam was 1 year ago. Wears glasses.  Immunizations are up to date. The patient needs the following immunizations: None at this time.    Lifestyle  Deysi  consumes  an average diet, better recently. .  She exercises three times a week.    Reproductive Health  Deysi  is premenopausal.  She reports periods are rare.  She is not sexually active. Her contraceptive plan is oral contraceptives (estrogen/progesterone).    Screening  Last pap was June 2022.  Last Completed Pap Smear            PAP SMEAR (Every 3 Years) Next due on 6/7/2025 06/07/2022  LIQUID-BASED PAP SMEAR, [facility] ([facility])    12/16/2019  Patient-Reported (Performed Externally) - ASCUS, HPV negative    12/16/2019  SCANNED - PAP SMEAR                . History of abnormal pap smear or family history of gyn cancer: No abnormal. No FH.  Last mammogram was never.  Last Completed  Mammogram       This patient has no relevant Health Maintenance data.        . Personal or family history of abnormal mammograms or breast cancer: Yes, maternal and paternal aunts.  Last colonoscopy was never.  Last Completed Colonoscopy       This patient has no relevant Health Maintenance data.        . Family history of colon cancer: No FH.  Last DEXA was never.    Health Maintenance Summary            INFLUENZA VACCINE (Yearly - August to March) Due since 8/1/2024 09/28/2023  Imm Admin: Fluzone (or Fluarix & Flulaval for VFC) >6mos    09/30/2022  Imm Admin: FluLaval/Fluzone >6mos    09/27/2021  Imm Admin: Fluzone Quad >6mos (Multi-dose)    09/27/2021  Imm Admin: Flu Vaccine Quad PF >36MO    09/27/2021  Imm Admin: FluLaval/Fluzone >6mos    Only the first 5 history entries have been loaded, but more history exists.              Postponed - TDAP/TD VACCINES (1 - Tdap) Postponed until 1/1/2026      11/15/2022  Postponed until 1/1/2026 by Nupur Rios MD (Not Indicated)              BMI FOLLOWUP (Yearly) Next due on 12/18/2024 12/18/2023  [facility]: BMI EDUCATION FOR OVERWEIGHT    07/21/2022  Smart[facility]: WORKFLOW - QUALITY MEASUREMENT - BMI FOLLOW UP CARE PLAN DOCUMENTED    07/21/2022  SmartData: WORKFLOW - QUALITY MEASUREMENT - DOCUMENTED WEIGHT FOLLOW-UP PLAN              PAP SMEAR (Every 3 Years) Next due on 6/7/2025 06/07/2022  LIQUID-BASED PAP SMEAR, [facility] ([facility])    12/16/2019  Patient-Reported (Performed Externally) - ASCUS, HPV negative    12/16/2019  SCANNED - PAP SMEAR              ANNUAL PHYSICAL (Yearly) Next due on 8/27/2025 08/27/2024  Done    07/25/2023  Registry Metric: Last Annual Physical              LIPID PANEL (Yearly) Next due on 8/27/2025 08/27/2024  Lipid Panel    07/25/2023  Lipid Panel    07/22/2022  Lipid Panel    08/05/2021  Lipid Panel    07/13/2020  Lipid Panel    Only the first 5 history entries have been loaded, but more history exists.          "     HEPATITIS C SCREENING  Completed      07/13/2020  Hepatitis C Antibody              Pneumococcal Vaccine 0-64 (Series Information) Completed      07/25/2023  Imm Admin: Pneumococcal Conjugate 20-Valent (PCV20)              COVID-19 Vaccine (Series Information) Completed      09/28/2023  Imm Admin: COVID-19 F23 (PFIZER) 12YRS+ (COMIRNATY)    09/30/2022  Imm Admin: COVID-19 (MODERNA) BIVALENT 12+YRS    10/04/2021  Imm Admin: COVID-19 (PFIZER) Purple Cap Monovalent    03/11/2021  Imm Admin: COVID-19 (PFIZER) Purple Cap Monovalent    02/18/2021  Imm Admin: COVID-19 (PFIZER) Purple Cap Monovalent    Only the first 5 history entries have been loaded, but more history exists.              Discontinued - HPV VACCINES  Discontinued      07/25/2023  Frequency changed to Never by Nupur Rios MD (completed)                  Immunization History   Administered Date(s) Administered    COVID-19 (MODERNA) BIVALENT 12+YRS 09/30/2022    COVID-19 (PFIZER) Purple Cap Monovalent 02/18/2021, 03/11/2021, 10/04/2021    COVID-19 F23 (PFIZER) 12YRS+ (COMIRNATY) 09/28/2023    Flu Vaccine Quad PF >36MO 09/27/2021    Fluzone (or Fluarix & Flulaval for VFC) >6mos 09/27/2021, 09/30/2022, 09/28/2023    Fluzone Quad >6mos (Multi-dose) 09/27/2021    Influenza, Unspecified 11/27/2020    Pneumococcal Conjugate 20-Valent (PCV20) 07/25/2023       Review of Systems   Constitutional: Negative.    HENT: Negative.     Respiratory: Negative.     Cardiovascular: Negative.    Gastrointestinal: Negative.    Genitourinary: Negative.    Musculoskeletal: Negative.    Neurological: Negative.    Psychiatric/Behavioral: Negative.         Objective     Vital Signs  /74   Pulse 98   Ht 170.2 cm (67\")   Wt (!) 140 kg (309 lb 4.8 oz)   SpO2 98%   BMI 48.44 kg/m²   Estimated body mass index is 48.44 kg/m² as calculated from the following:    Height as of this encounter: 170.2 cm (67\").    Weight as of this encounter: 140 kg (309 lb 4.8 oz).        "     Physical Exam  Constitutional:       General: She is awake. She is not in acute distress.     Appearance: Normal appearance. She is well-developed and well-groomed. She is not ill-appearing.   HENT:      Head: Normocephalic and atraumatic.      Right Ear: Tympanic membrane, ear canal and external ear normal.      Left Ear: Tympanic membrane, ear canal and external ear normal.      Nose: Nose normal.      Mouth/Throat:      Mouth: Mucous membranes are moist.      Pharynx: Oropharynx is clear.   Eyes:      General: No scleral icterus.     Extraocular Movements: Extraocular movements intact.      Conjunctiva/sclera: Conjunctivae normal.      Pupils: Pupils are equal, round, and reactive to light.   Neck:      Trachea: Trachea normal.   Cardiovascular:      Rate and Rhythm: Normal rate and regular rhythm.      Pulses: Normal pulses.      Heart sounds: Normal heart sounds. No murmur heard.  Pulmonary:      Effort: Pulmonary effort is normal. No tachypnea, accessory muscle usage or respiratory distress.      Breath sounds: Normal breath sounds. No wheezing, rhonchi or rales.   Abdominal:      General: Abdomen is flat. Bowel sounds are normal. There is no distension.      Palpations: Abdomen is soft.      Tenderness: There is no abdominal tenderness.   Genitourinary:     Comments: Deferred  Musculoskeletal:         General: No swelling. Normal range of motion.      Cervical back: Normal range of motion and neck supple. No tenderness.      Right lower leg: No edema.      Left lower leg: No edema.   Skin:     General: Skin is warm and dry.      Capillary Refill: Capillary refill takes less than 2 seconds.      Coloration: Skin is not jaundiced or pale.      Findings: No lesion or rash.   Neurological:      General: No focal deficit present.      Mental Status: She is alert and oriented to person, place, and time. Mental status is at baseline.      Motor: No weakness.      Gait: Gait is intact.   Psychiatric:          Attention and Perception: Attention normal.         Mood and Affect: Mood normal.         Speech: Speech normal.         Behavior: Behavior normal. Behavior is cooperative.         Thought Content: Thought content normal.         Judgment: Judgment normal.          Assessment and Plan     Diagnoses and all orders for this visit:    1. Annual physical exam (Primary)  -     Cancel: CBC (No Diff)  -     Cancel: Comprehensive metabolic panel; Future  -     Cancel: Lipid Panel  -     Cancel: Hemoglobin A1c  -     Cancel: Vitamin D,25-Hydroxy  -     CBC (No Diff)  -     Comprehensive metabolic panel  -     Hemoglobin A1c  -     Lipid Panel  -     Vitamin D,25-Hydroxy    2. Chronic migraine without aura without status migrainosus, not intractable  -     naproxen (NAPROSYN) 500 MG tablet; Take 1 tablet by mouth 2 (Two) Times a Day As Needed for Headache.  Dispense: 30 tablet; Refill: 5    3. Vitamin D deficiency  -     Vitamin D,25-Hydroxy    4. Elevated LDL cholesterol level  -     Lipid Panel    5. Anxiety and depression  -     busPIRone (BUSPAR) 10 MG tablet; Take 1 tablet by mouth 3 (Three) Times a Day.  Dispense: 90 tablet; Refill: 5        Plan:  Annual physical exam.  Orders placed for fasting labs.  Will review lab results with patient once received and reviewed.  Further plan of care based on lab result findings.  Up-to-date on Pap smear.  Medication refills for his naproxen and buspirone sent to the pharmacy on file.  Continue with up to date immunizations and health maintenance screenings.  Continue with healthy lifestyle choices such as healthy diet and eating habits and regular exercise routine.  Continue with adequate oral hydration and rest.  Annual physical exam in 1 year with Dr. Rios.  Return to clinic sooner if needed.    Follow Up  Return in about 1 year (around 8/27/2025) for Annual, Follow up with Dr. Rios.    JASS Riddle    Part of this note may be an electronic transcription/translation  of spoken language to printed text using the Dragon Dictation System.

## 2024-08-29 ENCOUNTER — OFFICE VISIT (OUTPATIENT)
Dept: BEHAVIORAL HEALTH | Facility: CLINIC | Age: 24
End: 2024-08-29
Payer: COMMERCIAL

## 2024-08-29 DIAGNOSIS — F41.1 GENERALIZED ANXIETY DISORDER: Primary | ICD-10-CM

## 2024-08-29 DIAGNOSIS — F32.A DEPRESSION, UNSPECIFIED DEPRESSION TYPE: ICD-10-CM

## 2024-08-29 NOTE — PROGRESS NOTES
[facility] [place]                 Follow Up Adult      Follow Up Adult Note     Date:2024   Patient Name: Deysi Ordoñez  : 2000   MRN: [id number]   Time IN: 9:00 am    Time OUT: 9:35 am     Referring Provider: Nupur Rios MD    Chief Complaint:      ICD-10-CM ICD-9-CM   1. Generalized anxiety disorder  F41.1 300.02   2. Depression, unspecified depression type  F32.A 311        History of Present Illness:   Deysi Ordoñez is a 24 y.o. female who is being seen today for follow up counseling for anxiety and depression.    Subjective               Patient's Support Network Includes: extended family    Functional Status: Mild impairment       Current Outpatient Medications:     albuterol (ACCUNEB) 1.25 MG/3ML nebulizer solution, Take 3 mL by nebulization Every 6 (Six) Hours As Needed., Disp: , Rfl:     albuterol sulfate  (90 Base) MCG/ACT inhaler, Inhale 1-2 puffs See Admin Instructions. Inhale 1-2 puffs every 4 to 6 hours as needed, Disp: , Rfl:     busPIRone (BUSPAR) 10 MG tablet, Take 1 tablet by mouth 3 (Three) Times a Day., Disp: 90 tablet, Rfl: 5    Cariprazine HCl (Vraylar) 1.5 MG capsule capsule, Take 1 capsule by mouth Daily., Disp: 90 capsule, Rfl: 1    cholecalciferol (VITAMIN D3) 25 MCG (1000 UT) tablet, Take 2 tablets by mouth Daily., Disp: , Rfl:     hydrOXYzine (ATARAX) 25 MG tablet, TAKE 1 TABLET BY MOUTH THREE TIMES A DAY AS NEEDED FOR ANXIETY, Disp: 90 tablet, Rfl: 0    ipratropium (ATROVENT) 0.06 % nasal spray, 1-2 SPRAYS EACH NOSTRIL 2 TIMES DAILY AS NEEDED, Disp: , Rfl:     montelukast (SINGULAIR) 10 MG tablet, , Disp: , Rfl:     naproxen (NAPROSYN) 500 MG tablet, Take 1 tablet by mouth 2 (Two) Times a Day As Needed for Headache., Disp: 30 tablet, Rfl: 5    norethindrone-ethinyl estradiol FE (Junel FE ) 1-20 MG-MCG per tablet, Take 1 tablet by mouth Daily., Disp: 90 tablet, Rfl: 3    propranolol (INDERAL) 10 MG  tablet, TAKE 1 TABLET BY MOUTH 2 (TWO) TIMES A DAY AS NEEDED (ANXIETY)., Disp: 60 tablet, Rfl: 0    Sodium Fluoride 5000 Sensitive 1.1-5 % gel, BRUSH WITH EVERY NIGHT. SPIT OUT DO NOT RINSE, Disp: , Rfl:     SUMAtriptan (IMITREX) 50 MG tablet, Take one tablet at onset of headache. May repeat dose one time in 2 hours if headache not relieved., Disp: 12 tablet, Rfl: 2    Trelegy Ellipta 100-62.5-25 MCG/ACT inhaler, Inhale 1 puff Daily., Disp: , Rfl:     venlafaxine XR (EFFEXOR-XR) 150 MG 24 hr capsule, Take 1 capsule by mouth Daily., Disp: , Rfl:     No Known Allergies    Objective     Physical Exam:  Vital Signs: There were no vitals filed for this visit.  There is no height or weight on file to calculate BMI.     Mental Status Exam:   Hygiene:   good  Cooperation:  Cooperative  Eye Contact:  Good  Psychomotor Behavior:  Appropriate  Affect:  Full range  Mood: normal  Speech:  Normal  Thought Process:  Goal directed  Thought Content:  Normal  Suicidal:  None  Homicidal:  None  Hallucinations:  None  Delusion:  None  Memory:  Intact  Orientation:  Person, Place, Time, and Situation  Reliability:  good  Insight:  Good  Judgement:  Good  Impulse Control:  Good  Physical/Medical Issues:   See problem list      Assessment / Plan      Diagnosis:  Diagnoses and all orders for this visit:    1. Generalized anxiety disorder (Primary)    2. Depression, unspecified depression type    Patient presented for follow-up with clinician.  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  Patient and clinician collaborated to formulate a self-care plan which accentuates diet and nutrition as well as exercise and meditation.  Clinician utilized active listening and reflective response to convey empathy and support.    Progress toward goal: Not at goal    Prognosis: Good with ongoing treatment    PLAN:  Patient clinician will will concentrate  on a patient care plan which emphasizes self-care through diet, exercise and meditation.  Follow-up will occur in approximately 14 days and will last from 45 to 60 minutes in duration.    Safety: No acute safety concerns  Risk Assessment: Risk of self-harm acutely is low. Risk of self-harm chronically is also low, but could be further elevated in the event of treatment noncompliance and/or AODA.    Treatment Plan/Goals: Continue supportive psychotherapy efforts and medications as indicated. Treatment and medication options discussed during today's visit. Patient ackowledged and verbally consented to continue with current treatment plan and was educated on the importance of compliance with treatment and follow-up appointments. Patient seems reasonably able to adhere to treatment plan.      Assisted Patient in processing above session content; acknowledged and normalized patient’s thoughts, feelings, and concerns.  Rationalized patient thought process regarding anxiety and depression.      Allowed Patient to freely discuss issues  without interruption or judgement with unconditional positive regard, active listening skills, and empathy. Therapist provided a safe, confidential environment to facilitate the development of a positive therapeutic relationship and encouraged open, honest communication. Assisted Patient in identifying risk factors which would indicate the need for higher level of care including thoughts to harm self or others and/or self-harming behavior and encouraged Patient to contact this office, call 911, or present to the nearest emergency room should any of these events occur. Discussed crisis intervention services and means to access. Patient adamantly and convincingly denies current suicidal or homicidal ideation or perceptual disturbance. Assisted Patient in processing session content; acknowledged and normalized Patient’s thoughts, feelings, and concerns by utilizing a person-centered approach in  efforts to build appropriate rapport and a positive therapeutic relationship with open and honest communication. .     Part of this note may be an electronic transcription/translation of spoken language to printed text using the Dragon Dictation System.       Follow Up:   Return in about 2 weeks (around 9/12/2024) for Next scheduled follow up.    James Escobar, ANYW

## 2024-09-12 ENCOUNTER — OFFICE VISIT (OUTPATIENT)
Dept: BEHAVIORAL HEALTH | Facility: CLINIC | Age: 24
End: 2024-09-12
Payer: COMMERCIAL

## 2024-09-12 DIAGNOSIS — F32.A DEPRESSION, UNSPECIFIED DEPRESSION TYPE: ICD-10-CM

## 2024-09-12 DIAGNOSIS — F41.1 GENERALIZED ANXIETY DISORDER: Primary | ICD-10-CM

## 2024-09-12 NOTE — TREATMENT PLAN
Multi-Disciplinary Problems (from Behavioral Health Treatment Plan)      Active Problems       Problem: Anxiety  Start Date: 09/12/24      Problem Details: The patient self-scales this problem as a 6 with 10 being the worst.          Goal Priority Start Date Expected End Date End Date    Patient will develop and implement behavioral and cognitive strategies to reduce anxiety and irrational fears. -- 09/12/24 03/13/25 --    Goal Details: Progress toward goal:  The patient self-scales their progress related to this goal as a 5 with 10 being the worst.          Goal Intervention Frequency Start Date End Date    Help patient explore past emotional issues in relation to present anxiety. PRN 09/12/24 --    Intervention Details: Duration of treatment until discharged.          Goal Intervention Frequency Start Date End Date    Help patient develop an awareness of their cognitive and physical responses to anxiety. PRN 09/12/24 --    Intervention Details: Duration of treatment until discharged.                  Problem: Depression  Start Date: 09/12/24      Problem Details: The patient self-scales this problem as a 8 with 10 being the worst.          Goal Priority Start Date Expected End Date End Date    Patient will demonstrate the ability to initiate new constructive life skills outside of sessions on a consistent basis. -- 09/12/24 03/13/25 --    Goal Details: Progress toward goal:  The patient self-scales their progress related to this goal as a 5 with 10 being the worst.          Goal Intervention Frequency Start Date End Date    Assist patient in setting attainable activities of daily living goals. PRN 09/12/24 --    Intervention Details: See care plan note        Goal Intervention Frequency Start Date End Date    Provide education about depression PRN 09/12/24 --    Intervention Details: Duration of treatment until discharged.          Goal Intervention Frequency Start Date End Date    Assist patient in developing  healthy coping strategies. PRN 09/12/24 --    Intervention Details: Duration of treatment until discharged.                                 I have discussed and reviewed this treatment plan with the patient.  It has been printed for signatures.

## 2024-09-12 NOTE — PROGRESS NOTES
[facility] [place]                 Follow Up Adult      Follow Up Adult Note     Date:2024   Patient Name: Deysi Ordoñez  : 2000   MRN: [id number]   Time IN: 9:03 am    Time OUT: 9:51 am     Referring Provider: Nupur Rios MD    Chief Complaint:      ICD-10-CM ICD-9-CM   1. Generalized anxiety disorder  F41.1 300.02   2. Depression, unspecified depression type  F32.A 311        History of Present Illness:   Deysi Ordoñez is a 24 y.o. female who is being seen today for follow up counseling for anxiety and depression.    Subjective               Patient's Support Network Includes: extended family    Functional Status: Mild impairment       Current Outpatient Medications:     albuterol (ACCUNEB) 1.25 MG/3ML nebulizer solution, Take 3 mL by nebulization Every 6 (Six) Hours As Needed., Disp: , Rfl:     albuterol sulfate  (90 Base) MCG/ACT inhaler, Inhale 1-2 puffs See Admin Instructions. Inhale 1-2 puffs every 4 to 6 hours as needed, Disp: , Rfl:     busPIRone (BUSPAR) 10 MG tablet, Take 1 tablet by mouth 3 (Three) Times a Day., Disp: 90 tablet, Rfl: 5    Cariprazine HCl (Vraylar) 1.5 MG capsule capsule, Take 1 capsule by mouth Daily., Disp: 90 capsule, Rfl: 1    cholecalciferol (VITAMIN D3) 25 MCG (1000 UT) tablet, Take 2 tablets by mouth Daily., Disp: , Rfl:     hydrOXYzine (ATARAX) 25 MG tablet, TAKE 1 TABLET BY MOUTH THREE TIMES A DAY AS NEEDED FOR ANXIETY, Disp: 90 tablet, Rfl: 0    ipratropium (ATROVENT) 0.06 % nasal spray, 1-2 SPRAYS EACH NOSTRIL 2 TIMES DAILY AS NEEDED, Disp: , Rfl:     montelukast (SINGULAIR) 10 MG tablet, , Disp: , Rfl:     naproxen (NAPROSYN) 500 MG tablet, Take 1 tablet by mouth 2 (Two) Times a Day As Needed for Headache., Disp: 30 tablet, Rfl: 5    norethindrone-ethinyl estradiol FE (Junel FE ) 1-20 MG-MCG per tablet, Take 1 tablet by mouth Daily., Disp: 90 tablet, Rfl: 3    propranolol (INDERAL) 10 MG  tablet, TAKE 1 TABLET BY MOUTH 2 (TWO) TIMES A DAY AS NEEDED (ANXIETY)., Disp: 60 tablet, Rfl: 0    Sodium Fluoride 5000 Sensitive 1.1-5 % gel, BRUSH WITH EVERY NIGHT. SPIT OUT DO NOT RINSE, Disp: , Rfl:     SUMAtriptan (IMITREX) 50 MG tablet, Take one tablet at onset of headache. May repeat dose one time in 2 hours if headache not relieved., Disp: 12 tablet, Rfl: 2    Trelegy Ellipta 100-62.5-25 MCG/ACT inhaler, Inhale 1 puff Daily., Disp: , Rfl:     venlafaxine XR (EFFEXOR-XR) 150 MG 24 hr capsule, Take 1 capsule by mouth Daily., Disp: , Rfl:     No Known Allergies    Objective     Physical Exam:  Vital Signs: There were no vitals filed for this visit.  There is no height or weight on file to calculate BMI.     Mental Status Exam:   Hygiene:   good  Cooperation:  Cooperative  Eye Contact:  Good  Psychomotor Behavior:  Appropriate  Affect:  Full range  Mood: normal  Speech:  Normal  Thought Process:  Goal directed  Thought Content:  Normal  Suicidal:  None  Homicidal:  None  Hallucinations:  None  Delusion:  None  Memory:  Intact  Orientation:  Person, Place, Time, and Situation  Reliability:  good  Insight:  Good  Judgement:  Good  Impulse Control:  Good  Physical/Medical Issues:   See problem list      Assessment / Plan      Diagnosis:  Diagnoses and all orders for this visit:    1. Generalized anxiety disorder (Primary)    2. Depression, unspecified depression type        Progress toward goal: Not at goal    Prognosis: Good with ongoing treatment.    PLAN:   Patient and clinician will continue to utilize a cognitive behavioral intervention which identifies and addresses patient cognitive distortions related to depression and anxiety.  Follow-ups will occur every 14 days and will last from 45 to 60 minutes in duration.       Safety: No acute safety concerns  Risk Assessment: Risk of self-harm acutely is low. Risk of self-harm chronically is also low, but could be further elevated in the event of treatment  noncompliance and/or AODA.    Treatment Plan/Goals: Continue supportive psychotherapy efforts and medications as indicated. Treatment and medication options discussed during today's visit. Patient ackowledged and verbally consented to continue with current treatment plan and was educated on the importance of compliance with treatment and follow-up appointments. Patient seems reasonably able to adhere to treatment plan.      Assisted Patient in processing above session content; acknowledged and normalized patient’s thoughts, feelings, and concerns.  Rationalized patient thought process regarding anxiety and depression.      Allowed Patient to freely discuss issues  without interruption or judgement with unconditional positive regard, active listening skills, and empathy. Therapist provided a safe, confidential environment to facilitate the development of a positive therapeutic relationship and encouraged open, honest communication. Assisted Patient in identifying risk factors which would indicate the need for higher level of care including thoughts to harm self or others and/or self-harming behavior and encouraged Patient to contact this office, call 911, or present to the nearest emergency room should any of these events occur. Discussed crisis intervention services and means to access. Patient adamantly and convincingly denies current suicidal or homicidal ideation or perceptual disturbance. Assisted Patient in processing session content; acknowledged and normalized Patient’s thoughts, feelings, and concerns by utilizing a person-centered approach in efforts to build appropriate rapport and a positive therapeutic relationship with open and honest communication. .     Part of this note may be an electronic transcription/translation of spoken language to printed text using the Dragon Dictation System.       Follow Up:   Return in about 2 weeks (around 9/26/2024) for Next scheduled follow up.    James Escobar,  LCSW

## 2024-09-12 NOTE — PLAN OF CARE
Treatment plan updated September 12, 2024.  Patient and clinician collaborated to establish a daily routine for patient which concentrates on exercise and diet.  Patient states they would like to move from 2-3 times a week on their exercise bike to 4-5 times weekly.  Patient will journal daily exercise and nutrition for review with clinician during biweekly sessions.  Treatment plan will be updated in 90 days.

## 2024-09-26 ENCOUNTER — OFFICE VISIT (OUTPATIENT)
Dept: BEHAVIORAL HEALTH | Facility: CLINIC | Age: 24
End: 2024-09-26
Payer: COMMERCIAL

## 2024-09-26 DIAGNOSIS — F32.A DEPRESSION, UNSPECIFIED DEPRESSION TYPE: ICD-10-CM

## 2024-09-26 DIAGNOSIS — F41.1 GENERALIZED ANXIETY DISORDER: Primary | ICD-10-CM

## 2024-09-26 DIAGNOSIS — F41.9 ANXIETY AND DEPRESSION: ICD-10-CM

## 2024-09-26 DIAGNOSIS — F32.A ANXIETY AND DEPRESSION: ICD-10-CM

## 2024-09-26 RX ORDER — CARIPRAZINE 1.5 MG/1
1.5 CAPSULE, GELATIN COATED ORAL DAILY
Qty: 90 CAPSULE | Refills: 1 | Status: SHIPPED | OUTPATIENT
Start: 2024-09-26

## 2024-10-10 ENCOUNTER — OFFICE VISIT (OUTPATIENT)
Dept: BEHAVIORAL HEALTH | Facility: CLINIC | Age: 24
End: 2024-10-10
Payer: COMMERCIAL

## 2024-10-10 DIAGNOSIS — F41.1 GENERALIZED ANXIETY DISORDER: Primary | ICD-10-CM

## 2024-10-10 DIAGNOSIS — F32.A DEPRESSION, UNSPECIFIED DEPRESSION TYPE: ICD-10-CM

## 2024-10-10 NOTE — PROGRESS NOTES
[facility] [place]                 Follow Up Adult      Follow Up Adult Note     Date:10/10/2024   Patient Name: Deysi Ordoñez  : 2000   MRN: [id number]   Time IN: 9:00 am    Time OUT: 9:42 am     Referring Provider: Nupur Rios MD    Chief Complaint:      ICD-10-CM ICD-9-CM   1. Generalized anxiety disorder  F41.1 300.02   2. Depression, unspecified depression type  F32.A 311        History of Present Illness:   Deysi Ordoñez is a 24 y.o. female who is being seen today for follow up counseling for anxiety and depression.    Subjective               Patient's Support Network Includes: extended family    Functional Status: Mild impairment       Current Outpatient Medications:     albuterol (ACCUNEB) 1.25 MG/3ML nebulizer solution, Take 3 mL by nebulization Every 6 (Six) Hours As Needed., Disp: , Rfl:     albuterol sulfate  (90 Base) MCG/ACT inhaler, Inhale 1-2 puffs See Admin Instructions. Inhale 1-2 puffs every 4 to 6 hours as needed, Disp: , Rfl:     busPIRone (BUSPAR) 10 MG tablet, Take 1 tablet by mouth 3 (Three) Times a Day., Disp: 90 tablet, Rfl: 5    cholecalciferol (VITAMIN D3) 25 MCG (1000 UT) tablet, Take 2 tablets by mouth Daily., Disp: , Rfl:     hydrOXYzine (ATARAX) 25 MG tablet, TAKE 1 TABLET BY MOUTH THREE TIMES A DAY AS NEEDED FOR ANXIETY, Disp: 90 tablet, Rfl: 0    ipratropium (ATROVENT) 0.06 % nasal spray, 1-2 SPRAYS EACH NOSTRIL 2 TIMES DAILY AS NEEDED, Disp: , Rfl:     montelukast (SINGULAIR) 10 MG tablet, , Disp: , Rfl:     naproxen (NAPROSYN) 500 MG tablet, Take 1 tablet by mouth 2 (Two) Times a Day As Needed for Headache., Disp: 30 tablet, Rfl: 5    norethindrone-ethinyl estradiol FE (Junel FE ) 1-20 MG-MCG per tablet, Take 1 tablet by mouth Daily., Disp: 90 tablet, Rfl: 3    propranolol (INDERAL) 10 MG tablet, TAKE 1 TABLET BY MOUTH 2 (TWO) TIMES A DAY AS NEEDED (ANXIETY)., Disp: 60 tablet, Rfl: 0    Sodium Fluoride  5000 Sensitive 1.1-5 % gel, BRUSH WITH EVERY NIGHT. SPIT OUT DO NOT RINSE, Disp: , Rfl:     SUMAtriptan (IMITREX) 50 MG tablet, Take one tablet at onset of headache. May repeat dose one time in 2 hours if headache not relieved., Disp: 12 tablet, Rfl: 2    Trelegy Ellipta 100-62.5-25 MCG/ACT inhaler, Inhale 1 puff Daily., Disp: , Rfl:     venlafaxine XR (EFFEXOR-XR) 150 MG 24 hr capsule, Take 1 capsule by mouth Daily., Disp: , Rfl:     Vraylar 1.5 MG capsule capsule, TAKE 1 CAPSULE BY MOUTH EVERY DAY, Disp: 90 capsule, Rfl: 1    No Known Allergies    Objective     Physical Exam:  Vital Signs: There were no vitals filed for this visit.  There is no height or weight on file to calculate BMI.     Mental Status Exam:   Hygiene:   good  Cooperation:  Cooperative  Eye Contact:  Good  Psychomotor Behavior:  Appropriate  Affect:  Full range  Mood: normal  Speech:  Normal  Thought Process:  Goal directed  Thought Content:  Normal  Suicidal:  None  Homicidal:  None  Hallucinations:  None  Delusion:  None  Memory:  Intact  Orientation:  Person, Place, Time, and Situation  Reliability:  good  Insight:  Good  Judgement:  Good  Impulse Control:  Good  Physical/Medical Issues:   See problem list      Assessment / Plan      Diagnosis:  Diagnoses and all orders for this visit:    1. Generalized anxiety disorder (Primary)    2. Depression, unspecified depression type    Patient presented for follow-up with clinician.  Patient and clinician reviewed positive treat worksheets as completed by patient, mother and 4 friends of patient.  Differentials identified include the qualities of wisdom, leadership, commonsense, gratitude, level of learning and ambition.  Patient and clinician identified and addressed patient cognitive distortions related to their perceived lack of quantity and these qualities.  Patient was responsive to intervention.  Clinician utilized active listening and reflective responsive empathy and support.    Progress  toward goal: Not at goal  Prognosis: Good with ongoing treatment    PLAN:  Patient and clinician will continue to utilize a cognitive behavioral intervention which identifies and addresses patient cognitive distortions related to low self-esteem.  Follow-ups will occur approximately every 14 days and will last from 45 to 60 minutes in duration.    Safety: No acute safety concerns  Risk Assessment: Risk of self-harm acutely is low. Risk of self-harm chronically is also low, but could be further elevated in the event of treatment noncompliance and/or AODA.    Treatment Plan/Goals: Continue supportive psychotherapy efforts and medications as indicated. Treatment and medication options discussed during today's visit. Patient ackowledged and verbally consented to continue with current treatment plan and was educated on the importance of compliance with treatment and follow-up appointments. Patient seems reasonably able to adhere to treatment plan.      Assisted Patient in processing above session content; acknowledged and normalized patient’s thoughts, feelings, and concerns.  Rationalized patient thought process regarding anxiety and depression.      Allowed Patient to freely discuss issues  without interruption or judgement with unconditional positive regard, active listening skills, and empathy. Therapist provided a safe, confidential environment to facilitate the development of a positive therapeutic relationship and encouraged open, honest communication. Assisted Patient in identifying risk factors which would indicate the need for higher level of care including thoughts to harm self or others and/or self-harming behavior and encouraged Patient to contact this office, call 911, or present to the nearest emergency room should any of these events occur. Discussed crisis intervention services and means to access. Patient adamantly and convincingly denies current suicidal or homicidal ideation or perceptual disturbance.  Assisted Patient in processing session content; acknowledged and normalized Patient’s thoughts, feelings, and concerns by utilizing a person-centered approach in efforts to build appropriate rapport and a positive therapeutic relationship with open and honest communication. .     Part of this note may be an electronic transcription/translation of spoken language to printed text using the Dragon Dictation System.       Follow Up:   Return in about 2 weeks (around 10/24/2024) for Next scheduled follow up.    James Escobar LCSW

## 2024-10-24 ENCOUNTER — OFFICE VISIT (OUTPATIENT)
Dept: BEHAVIORAL HEALTH | Facility: CLINIC | Age: 24
End: 2024-10-24
Payer: COMMERCIAL

## 2024-10-24 DIAGNOSIS — F32.A DEPRESSION, UNSPECIFIED DEPRESSION TYPE: ICD-10-CM

## 2024-10-24 DIAGNOSIS — F41.1 GENERALIZED ANXIETY DISORDER: Primary | ICD-10-CM

## 2024-10-24 NOTE — PROGRESS NOTES
[facility] [place]                  Initial Assessment      Initial Adult Note     Date:10/24/2024   Patient Name: Deysi Ordoñez  : 2000   MRN: [id number]   Time IN: 9:02 am    Time OUT: ***     Referring Provider: Nupur Rios MD    Chief Complaint:      ICD-10-CM ICD-9-CM   1. Generalized anxiety disorder  F41.1 300.02   2. Depression, unspecified depression type  F32.A 311        History of Present Illness:   Deysi Ordoñez is a 24 y.o. female who is being seen today for counseling for ***       Past Psychiatric History:   ***    Subjective      Review of Systems              Patient's Support Network Includes:  {family members:}    Functional Status: {rsfunctionalstatus:59829}    Significant Life Events:   Verbal, physical, sexual abuse? {YES NO:48559}  Has patient experienced a death / loss of relationship? {YES NO:19788}  Has patient experienced a major accident or tragic events? {YES NO:28981}    Work History:   Highest level of education obtained: {MISC; EDUCATION LEVELS:85983}  Ever been active duty in the ? {YES NO:02253}  Patient's Occupation: ***    Interpersonal/Relational:  Marital Status: {MARITAL STATUS:43756}  Support system: {support system:64760}    Mental/Behavioral Health History:  History of prior treatment or hospitalization: ***  Past diagnoses: ***  Are there any significant health issues (current or past): ***  History of seizures: {YES NO:09036}    Family Psychiatric History:  ***    History of Substance Use:  Patient answered: {YES NO:61219}    Triggers: (Persons/Places/Things/Events/Thought/Emotions): ***    Social History     Socioeconomic History    Marital status: Single   Tobacco Use    Smoking status: Never    Smokeless tobacco: Never   Vaping Use    Vaping status: Never Used   Substance and Sexual Activity    Alcohol use: Yes     Alcohol/week: 2.0 standard drinks of alcohol     Types: 2 Glasses of wine  per week     Comment: once a week    Drug use: Never    Sexual activity: Never     Birth control/protection: OCP        Past Medical History:   Diagnosis Date    Allergic     Anxiety     Asthma     Depression     Diarrhea 2/11/2021    Migraines        Past Surgical History:   Procedure Laterality Date    ADENOIDECTOMY  05/2018    NASAL POLYP SURGERY  4/2009, 5/2018    NASAL SEPTUM SURGERY  4/2009, 5/2018    NASAL SINUS SURGERY  05/2018    TURBINATE RESECTION  05/2018    WISDOM TOOTH EXTRACTION  08/2011       Family History   Problem Relation Age of Onset    Thyroid disease Mother     Arthritis Mother     Hypertension Maternal Grandmother     Arthritis Maternal Grandmother     Depression Maternal Grandmother     Hypertension Maternal Grandfather     Depression Maternal Grandfather     Depression Paternal Grandmother     Dementia Paternal Grandmother     Depression Paternal Grandfather     Breast cancer Maternal Aunt 60    Cancer Paternal Aunt         Breast Cancer    Cancer Maternal Aunt         Breast Cancer    Depression Paternal Aunt     Ovarian cancer Neg Hx     Uterine cancer Neg Hx     Endometrial cancer Neg Hx     Colon cancer Neg Hx          Current Outpatient Medications:     albuterol (ACCUNEB) 1.25 MG/3ML nebulizer solution, Take 3 mL by nebulization Every 6 (Six) Hours As Needed., Disp: , Rfl:     albuterol sulfate  (90 Base) MCG/ACT inhaler, Inhale 1-2 puffs See Admin Instructions. Inhale 1-2 puffs every 4 to 6 hours as needed, Disp: , Rfl:     busPIRone (BUSPAR) 10 MG tablet, Take 1 tablet by mouth 3 (Three) Times a Day., Disp: 90 tablet, Rfl: 5    cholecalciferol (VITAMIN D3) 25 MCG (1000 UT) tablet, Take 2 tablets by mouth Daily., Disp: , Rfl:     hydrOXYzine (ATARAX) 25 MG tablet, TAKE 1 TABLET BY MOUTH THREE TIMES A DAY AS NEEDED FOR ANXIETY, Disp: 90 tablet, Rfl: 0    ipratropium (ATROVENT) 0.06 % nasal spray, 1-2 SPRAYS EACH NOSTRIL 2 TIMES DAILY AS NEEDED, Disp: , Rfl:     montelukast  (SINGULAIR) 10 MG tablet, , Disp: , Rfl:     naproxen (NAPROSYN) 500 MG tablet, Take 1 tablet by mouth 2 (Two) Times a Day As Needed for Headache., Disp: 30 tablet, Rfl: 5    norethindrone-ethinyl estradiol FE (Junel FE 1/20) 1-20 MG-MCG per tablet, Take 1 tablet by mouth Daily., Disp: 90 tablet, Rfl: 3    propranolol (INDERAL) 10 MG tablet, TAKE 1 TABLET BY MOUTH 2 (TWO) TIMES A DAY AS NEEDED (ANXIETY)., Disp: 60 tablet, Rfl: 0    Sodium Fluoride 5000 Sensitive 1.1-5 % gel, BRUSH WITH EVERY NIGHT. SPIT OUT DO NOT RINSE, Disp: , Rfl:     SUMAtriptan (IMITREX) 50 MG tablet, Take one tablet at onset of headache. May repeat dose one time in 2 hours if headache not relieved., Disp: 12 tablet, Rfl: 2    Trelegy Ellipta 100-62.5-25 MCG/ACT inhaler, Inhale 1 puff Daily., Disp: , Rfl:     venlafaxine XR (EFFEXOR-XR) 150 MG 24 hr capsule, Take 1 capsule by mouth Daily., Disp: , Rfl:     Vraylar 1.5 MG capsule capsule, TAKE 1 CAPSULE BY MOUTH EVERY DAY, Disp: 90 capsule, Rfl: 1    No Known Allergies    Objective     Physical Exam:  Vital Signs: There were no vitals filed for this visit.  There is no height or weight on file to calculate BMI.     Physical Exam    Mental Status Exam:   Hygiene:   {good/fair/poor:776841180}  Cooperation:  {Cooperation:051125300}  Eye Contact:  {Eye Contact:165725820}  Psychomotor Behavior:  {Psychomotor Behavior:74340}  Affect:  {Affect:013860905}  Mood: {Mood:49856}  Speech:  {Speech:387121178}  Thought Process:  {Thought Process:600473216}  Thought Content:  {Thought Content:278184459}  Suicidal:  {Suicidal:643874548}  Homicidal:  {Homidical:275431848}  Hallucinations:  {Hallucinations:126484359}  Delusion:  {Delusion:594707635}  Memory:  {Memory:098960577}  Orientation:  {Orientation:399462091}  Reliability:  {good/fair/poor:506611626}  Insight:  {good/fair/poor/none:351701429}  Judgement:  {good/fair/poor/impaired:775418537}  Impulse Control:   {good/fair/poor/impaired:601369105}  Physical/Medical Issues:  {No/Yes:851538036}     SUICIDE RISK ASSESSMENT/CSSRS:  1. Does patient have thoughts of suicide? {Yes or No:18329}  2. Does patient have intent for suicide? {Yes or No:64622}  3. Does patient have a current plan for suicide? {Yes or No:64406}  4. History of suicide attempts: {Yes or No:60396}  5. Family history of suicide or attempts: {Yes or No:01446}  6. History of violent behaviors towards others or property or thoughts of committing suicide: {Yes or No:41153}  7. History of sexual aggression toward others: {Yes or No:64844}  8. Access to firearms or weapons: {Yes or No:97029}    Assessment / Plan      Diagnosis  Diagnoses and all orders for this visit:    1. Generalized anxiety disorder (Primary)    2. Depression, unspecified depression type        Prognosis  ***    Progress toward goal: ***      PLAN:  ***     Safety: {Safety :94302}  Risk Assessment: Risk of self-harm acutely is low. Risk of self-harm chronically is also low, but could be further elevated in the event of treatment noncompliance and/or AODA.    Treatment Plan/Goals: Continue supportive psychotherapy efforts and medications as indicated. Treatment and medication options discussed during today's visit. Patient ackowledged and verbally consented to continue with current treatment plan and was educated on the importance of compliance with treatment and follow-up appointments. Patient seems reasonably able to adhere to treatment plan.      Assisted Patient in processing above session content; acknowledged and normalized patient’s thoughts, feelings, and concerns.  Rationalized patient thought process regarding ***.      Allowed Patient to freely discuss issues  without interruption or judgement with unconditional positive regard, active listening skills, and empathy. Therapist provided a safe, confidential environment to facilitate the development of a positive therapeutic relationship  and encouraged open, honest communication. Assisted Patient in identifying risk factors which would indicate the need for higher level of care including thoughts to harm self or others and/or self-harming behavior and encouraged Patient to contact this office, call 911, or present to the nearest emergency room should any of these events occur. Discussed crisis intervention services and means to access. Patient adamantly and convincingly denies current suicidal or homicidal ideation or perceptual disturbance. Assisted Patient in processing session content; acknowledged and normalized Patient’s thoughts, feelings, and concerns by utilizing a person-centered approach in efforts to build appropriate rapport and a positive therapeutic relationship with open and honest communication.     Part of this note may be an electronic transcription/translation of spoken language to printed text using the Dragon Dictation System.       Follow Up:   No follow-ups on file.    James Escoabr LCSW

## 2024-10-24 NOTE — PROGRESS NOTES
[facility] [place]                 Follow Up Adult      Follow Up Adult Note     Date:10/24/2024   Patient Name: Deysi Ordoñez  : 2000   MRN: [id number]   Time IN: 9:02 am    Time OUT: 9:47 am     Referring Provider: Nupur Rios MD    Chief Complaint:      ICD-10-CM ICD-9-CM   1. Generalized anxiety disorder  F41.1 300.02   2. Depression, unspecified depression type  F32.A 311        History of Present Illness:   Deysi Ordoñez is a 24 y.o. female who is being seen today for follow up counseling for anxiety and depression.    Subjective               Patient's Support Network Includes: extended family    Functional Status: Mild impairment       Current Outpatient Medications:     albuterol (ACCUNEB) 1.25 MG/3ML nebulizer solution, Take 3 mL by nebulization Every 6 (Six) Hours As Needed., Disp: , Rfl:     albuterol sulfate  (90 Base) MCG/ACT inhaler, Inhale 1-2 puffs See Admin Instructions. Inhale 1-2 puffs every 4 to 6 hours as needed, Disp: , Rfl:     busPIRone (BUSPAR) 10 MG tablet, Take 1 tablet by mouth 3 (Three) Times a Day., Disp: 90 tablet, Rfl: 5    cholecalciferol (VITAMIN D3) 25 MCG (1000 UT) tablet, Take 2 tablets by mouth Daily., Disp: , Rfl:     hydrOXYzine (ATARAX) 25 MG tablet, TAKE 1 TABLET BY MOUTH THREE TIMES A DAY AS NEEDED FOR ANXIETY, Disp: 90 tablet, Rfl: 0    ipratropium (ATROVENT) 0.06 % nasal spray, 1-2 SPRAYS EACH NOSTRIL 2 TIMES DAILY AS NEEDED, Disp: , Rfl:     montelukast (SINGULAIR) 10 MG tablet, , Disp: , Rfl:     naproxen (NAPROSYN) 500 MG tablet, Take 1 tablet by mouth 2 (Two) Times a Day As Needed for Headache., Disp: 30 tablet, Rfl: 5    norethindrone-ethinyl estradiol FE (Junel FE ) 1-20 MG-MCG per tablet, Take 1 tablet by mouth Daily., Disp: 90 tablet, Rfl: 3    propranolol (INDERAL) 10 MG tablet, TAKE 1 TABLET BY MOUTH 2 (TWO) TIMES A DAY AS NEEDED (ANXIETY)., Disp: 60 tablet, Rfl: 0    Sodium Fluoride  5000 Sensitive 1.1-5 % gel, BRUSH WITH EVERY NIGHT. SPIT OUT DO NOT RINSE, Disp: , Rfl:     SUMAtriptan (IMITREX) 50 MG tablet, Take one tablet at onset of headache. May repeat dose one time in 2 hours if headache not relieved., Disp: 12 tablet, Rfl: 2    Trelegy Ellipta 100-62.5-25 MCG/ACT inhaler, Inhale 1 puff Daily., Disp: , Rfl:     venlafaxine XR (EFFEXOR-XR) 150 MG 24 hr capsule, Take 1 capsule by mouth Daily., Disp: , Rfl:     Vraylar 1.5 MG capsule capsule, TAKE 1 CAPSULE BY MOUTH EVERY DAY, Disp: 90 capsule, Rfl: 1    No Known Allergies    Objective     Physical Exam:  Vital Signs: There were no vitals filed for this visit.  There is no height or weight on file to calculate BMI.     Mental Status Exam:   Hygiene:   good  Cooperation:  Cooperative  Eye Contact:  Good  Psychomotor Behavior:  Appropriate  Affect:  Full range  Mood: normal  Speech:  Normal  Thought Process:  Goal directed  Thought Content:  Normal  Suicidal:  None  Homicidal:  None  Hallucinations:  None  Delusion:  None  Memory:  Intact  Orientation:  Person, Place, Time, and Situation  Reliability:  good  Insight:  Good  Judgement:  Good  Impulse Control:  Good  Physical/Medical Issues:   See problem list      Assessment / Plan      Diagnosis:  Diagnoses and all orders for this visit:    1. Generalized anxiety disorder (Primary)    2. Depression, unspecified depression type    Patient patient presented for follow-up with clinician.  Patient and clinician addressed patient cognitive distortions related to anxiety.  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  Patient and clinician processed patient fear of failure.  Patient and clinician processed patient pros and cons of staying in PhD program versus receiving their masters degree and finding gainful employment.  Patient was responsive to intervention.  Clinician utilized active listening and reflective responsive empathy and support.    Progress  toward goal: Not at goal    Prognosis: Good with ongoing treatment    PLAN:  Patient and clinician will continue to utilize a cognitive behavioral intervention which identifies some addresses patient cognitive distortions related to anxiety and depression.  Follow-ups will occur every 14 days and will last from 45 to 60 minutes in duration.    Safety: No acute safety concerns  Risk Assessment: Risk of self-harm acutely is low. Risk of self-harm chronically is also low, but could be further elevated in the event of treatment noncompliance and/or AODA.    Treatment Plan/Goals: Continue supportive psychotherapy efforts and medications as indicated. Treatment and medication options discussed during today's visit. Patient ackowledged and verbally consented to continue with current treatment plan and was educated on the importance of compliance with treatment and follow-up appointments. Patient seems reasonably able to adhere to treatment plan.      Assisted Patient in processing above session content; acknowledged and normalized patient’s thoughts, feelings, and concerns.  Rationalized patient thought process regarding anxiety and depression.      Allowed Patient to freely discuss issues  without interruption or judgement with unconditional positive regard, active listening skills, and empathy. Therapist provided a safe, confidential environment to facilitate the development of a positive therapeutic relationship and encouraged open, honest communication. Assisted Patient in identifying risk factors which would indicate the need for higher level of care including thoughts to harm self or others and/or self-harming behavior and encouraged Patient to contact this office, call 911, or present to the nearest emergency room should any of these events occur. Discussed crisis intervention services and means to access. Patient adamantly and convincingly denies current suicidal or homicidal ideation or perceptual disturbance.  Assisted Patient in processing session content; acknowledged and normalized Patient’s thoughts, feelings, and concerns by utilizing a person-centered approach in efforts to build appropriate rapport and a positive therapeutic relationship with open and honest communication. .     Part of this note may be an electronic transcription/translation of spoken language to printed text using the Dragon Dictation System.       Follow Up:   Return in about 1 week (around 10/31/2024) for Next scheduled follow up.    James Escobar LCSW

## 2024-11-04 ENCOUNTER — PRIOR AUTHORIZATION (OUTPATIENT)
Dept: INTERNAL MEDICINE | Facility: CLINIC | Age: 24
End: 2024-11-04
Payer: COMMERCIAL

## 2024-11-04 ENCOUNTER — TELEPHONE (OUTPATIENT)
Dept: INTERNAL MEDICINE | Facility: CLINIC | Age: 24
End: 2024-11-04
Payer: COMMERCIAL

## 2024-11-04 NOTE — TELEPHONE ENCOUNTER
"Caller: Deysi Ordoñez \"Ali\"    Relationship: Self    Best call back number: [phone]    Which medication are you concerned about: LANE    Who prescribed you this medication: DR MCCORMICK    What are your concerns: PHARMACY STATES PATIENT NEEDS A PRIOR AUTHORIZATION FOR MEDICATION. PATIENT IS OUT OF MEDICATION. PLEASE ADVISE      "

## 2024-11-05 ENCOUNTER — OFFICE VISIT (OUTPATIENT)
Dept: BEHAVIORAL HEALTH | Facility: CLINIC | Age: 24
End: 2024-11-05
Payer: COMMERCIAL

## 2024-11-05 DIAGNOSIS — F41.1 GENERALIZED ANXIETY DISORDER: Primary | ICD-10-CM

## 2024-11-05 DIAGNOSIS — F32.A DEPRESSION, UNSPECIFIED DEPRESSION TYPE: ICD-10-CM

## 2024-11-05 NOTE — PROGRESS NOTES
[facility] [place]                 Follow Up Adult      Follow Up Adult Note     Date:2024   Patient Name: Deysi Ordñoez  : 2000   MRN: [id number]   Time IN: 9:00 am      Time OUT: 9:36 am     Referring Provider: Nupur Rios MD    Chief Complaint:      ICD-10-CM ICD-9-CM   1. Generalized anxiety disorder  F41.1 300.02   2. Depression, unspecified depression type  F32.A 311        History of Present Illness:   Deysi Ordoñez is a 24 y.o. female who is being seen today for follow up counseling for GARRETT and MDD.    Subjective               Patient's Support Network Includes: extended family    Functional Status: Mild impairment       Current Outpatient Medications:     albuterol (ACCUNEB) 1.25 MG/3ML nebulizer solution, Take 3 mL by nebulization Every 6 (Six) Hours As Needed., Disp: , Rfl:     albuterol sulfate  (90 Base) MCG/ACT inhaler, Inhale 1-2 puffs See Admin Instructions. Inhale 1-2 puffs every 4 to 6 hours as needed, Disp: , Rfl:     busPIRone (BUSPAR) 10 MG tablet, Take 1 tablet by mouth 3 (Three) Times a Day., Disp: 90 tablet, Rfl: 5    cholecalciferol (VITAMIN D3) 25 MCG (1000 UT) tablet, Take 2 tablets by mouth Daily., Disp: , Rfl:     hydrOXYzine (ATARAX) 25 MG tablet, TAKE 1 TABLET BY MOUTH THREE TIMES A DAY AS NEEDED FOR ANXIETY, Disp: 90 tablet, Rfl: 0    ipratropium (ATROVENT) 0.06 % nasal spray, 1-2 SPRAYS EACH NOSTRIL 2 TIMES DAILY AS NEEDED, Disp: , Rfl:     montelukast (SINGULAIR) 10 MG tablet, , Disp: , Rfl:     naproxen (NAPROSYN) 500 MG tablet, Take 1 tablet by mouth 2 (Two) Times a Day As Needed for Headache., Disp: 30 tablet, Rfl: 5    norethindrone-ethinyl estradiol FE (Junel FE ) 1-20 MG-MCG per tablet, Take 1 tablet by mouth Daily., Disp: 90 tablet, Rfl: 3    propranolol (INDERAL) 10 MG tablet, TAKE 1 TABLET BY MOUTH 2 (TWO) TIMES A DAY AS NEEDED (ANXIETY)., Disp: 60 tablet, Rfl: 0    Sodium Fluoride 5000  Sensitive 1.1-5 % gel, BRUSH WITH EVERY NIGHT. SPIT OUT DO NOT RINSE, Disp: , Rfl:     SUMAtriptan (IMITREX) 50 MG tablet, Take one tablet at onset of headache. May repeat dose one time in 2 hours if headache not relieved., Disp: 12 tablet, Rfl: 2    Trelegy Ellipta 100-62.5-25 MCG/ACT inhaler, Inhale 1 puff Daily., Disp: , Rfl:     venlafaxine XR (EFFEXOR-XR) 150 MG 24 hr capsule, Take 1 capsule by mouth Daily., Disp: , Rfl:     Vraylar 1.5 MG capsule capsule, TAKE 1 CAPSULE BY MOUTH EVERY DAY, Disp: 90 capsule, Rfl: 1    No Known Allergies    Objective     Physical Exam:  Vital Signs: There were no vitals filed for this visit.  There is no height or weight on file to calculate BMI.     Mental Status Exam:   Hygiene:   good  Cooperation:  Cooperative  Eye Contact:  Good  Psychomotor Behavior:  Appropriate  Affect:  Full range  Mood: normal  Speech:  Normal  Thought Process:  Goal directed  Thought Content:  Normal  Suicidal:  None  Homicidal:  None  Hallucinations:  None  Delusion:  None  Memory:  Intact  Orientation:  Person, Place, Time, and Situation  Reliability:  good  Insight:  Good  Judgement:  Good  Impulse Control:  Good  Physical/Medical Issues:   See problem list      Assessment / Plan      Diagnosis:  Diagnoses and all orders for this visit:    1. Generalized anxiety disorder (Primary)    2. Depression, unspecified depression type    Patient presented for follow-up with clinician.  Patient and clinician identified and addressed patient cognitive distortions related to anxiety.  The current political climate and the national election were identified as main triggers.  Patient was responsive to intervention.  Clinician utilized active listening and reflective response to convey empathy and support.    Progress toward goal: Not at goal    Prognosis: Good with ongoing treatment    PLAN:  Patient clinician will continue to utilize a cognitive behavioral intervention which identifies and addresses patient  cognitive distortions related to anxiety and depression.  Follow-ups will occur every 14 days will last for 45 to 60 minutes in duration.    Safety: No acute safety concerns  Risk Assessment: Risk of self-harm acutely is low. Risk of self-harm chronically is also low, but could be further elevated in the event of treatment noncompliance and/or AODA.    Treatment Plan/Goals: Continue supportive psychotherapy efforts and medications as indicated. Treatment and medication options discussed during today's visit. Patient ackowledged and verbally consented to continue with current treatment plan and was educated on the importance of compliance with treatment and follow-up appointments. Patient seems reasonably able to adhere to treatment plan.      Assisted Patient in processing above session content; acknowledged and normalized patient’s thoughts, feelings, and concerns.  Rationalized patient thought process regarding anxiety and depression.      Allowed Patient to freely discuss issues  without interruption or judgement with unconditional positive regard, active listening skills, and empathy. Therapist provided a safe, confidential environment to facilitate the development of a positive therapeutic relationship and encouraged open, honest communication. Assisted Patient in identifying risk factors which would indicate the need for higher level of care including thoughts to harm self or others and/or self-harming behavior and encouraged Patient to contact this office, call 911, or present to the nearest emergency room should any of these events occur. Discussed crisis intervention services and means to access. Patient adamantly and convincingly denies current suicidal or homicidal ideation or perceptual disturbance. Assisted Patient in processing session content; acknowledged and normalized Patient’s thoughts, feelings, and concerns by utilizing a person-centered approach in efforts to build appropriate rapport and a  positive therapeutic relationship with open and honest communication. .     Part of this note may be an electronic transcription/translation of spoken language to printed text using the Dragon Dictation System.       Follow Up:   Return in about 2 weeks (around 11/19/2024) for Next scheduled follow up.    James Escobar LCSW

## 2024-11-27 ENCOUNTER — OFFICE VISIT (OUTPATIENT)
Dept: INTERNAL MEDICINE | Facility: CLINIC | Age: 24
End: 2024-11-27
Payer: COMMERCIAL

## 2024-11-27 VITALS
OXYGEN SATURATION: 98 % | WEIGHT: 293 LBS | HEART RATE: 123 BPM | DIASTOLIC BLOOD PRESSURE: 80 MMHG | SYSTOLIC BLOOD PRESSURE: 128 MMHG | HEIGHT: 67 IN | BODY MASS INDEX: 45.99 KG/M2

## 2024-11-27 DIAGNOSIS — R21 SKIN RASH: ICD-10-CM

## 2024-11-27 DIAGNOSIS — F32.A ANXIETY AND DEPRESSION: Primary | ICD-10-CM

## 2024-11-27 DIAGNOSIS — F41.9 ANXIETY AND DEPRESSION: Primary | ICD-10-CM

## 2024-11-27 PROCEDURE — 99214 OFFICE O/P EST MOD 30 MIN: CPT | Performed by: INTERNAL MEDICINE

## 2024-11-27 RX ORDER — ARIPIPRAZOLE 2 MG/1
2 TABLET ORAL DAILY
Qty: 90 TABLET | Refills: 1 | Status: SHIPPED | OUTPATIENT
Start: 2024-11-27

## 2024-11-27 RX ORDER — VENLAFAXINE HYDROCHLORIDE 150 MG/1
150 CAPSULE, EXTENDED RELEASE ORAL DAILY
Qty: 90 CAPSULE | Refills: 1 | Status: SHIPPED | OUTPATIENT
Start: 2024-11-27

## 2024-11-27 NOTE — PROGRESS NOTES
Internal Medicine Follow Up    Chief Complaint  Deysi Ordoñez is a 24 y.o. female who presents today for follow up of chronic medical conditions outlined below.    Chief Complaint   Patient presents with    Depression     Patient reports today for a follow up on anxiety and depression. Patient states that she got new insurance and her insurance does not cover her depression medication so she needs to discuss different options for medication.         HPI  Ms. Ordoñez comes in today for follow up on anxiety and depression. She has taken time off school. She is in counseling which has been beneficial. She reports that mood had been stable up until the last two weeks. She has run out of [employer] and on new insurance it is $1400 per month which is unaffordable. She denies SI. Will be returning to school in January so insurance will change again then.     Depression  Presents for follow-up visit. Symptoms include depressed mood. Patient is not experiencing: compulsions, confusion, excessive worry, hypersomnia, insomnia, irritability, obsessions, suicidal ideas, dizziness, malaise/fatigue and difficulty controlling mood.Symptoms occur most days.  The severity of symptoms is mild.  The symptoms are aggravated by nothing. Her past medical history is significant for depression. Compliance with medications is %.        Review of Systems  Review of Systems   Constitutional:  Negative for irritability and malaise/fatigue.   Neurological:  Negative for dizziness and confusion.   Psychiatric/Behavioral:  Positive for depressed mood. Negative for self-injury and suicidal ideas. The patient does not have insomnia.         Current Medications  Current Outpatient Medications on File Prior to Visit   Medication Sig Dispense Refill    albuterol (ACCUNEB) 1.25 MG/3ML nebulizer solution Take 3 mL by nebulization Every 6 (Six) Hours As Needed.      albuterol sulfate  (90 Base) MCG/ACT inhaler Inhale 1-2 puffs See Admin  "Instructions. Inhale 1-2 puffs every 4 to 6 hours as needed      busPIRone (BUSPAR) 10 MG tablet Take 1 tablet by mouth 3 (Three) Times a Day. 90 tablet 5    cholecalciferol (VITAMIN D3) 25 MCG (1000 UT) tablet Take 2 tablets by mouth Daily.      hydrOXYzine (ATARAX) 25 MG tablet TAKE 1 TABLET BY MOUTH THREE TIMES A DAY AS NEEDED FOR ANXIETY 90 tablet 0    ipratropium (ATROVENT) 0.06 % nasal spray 1-2 SPRAYS EACH NOSTRIL 2 TIMES DAILY AS NEEDED      montelukast (SINGULAIR) 10 MG tablet       naproxen (NAPROSYN) 500 MG tablet Take 1 tablet by mouth 2 (Two) Times a Day As Needed for Headache. 30 tablet 5    norethindrone-ethinyl estradiol FE (Junel FE 1/20) 1-20 MG-MCG per tablet Take 1 tablet by mouth Daily. 90 tablet 3    propranolol (INDERAL) 10 MG tablet TAKE 1 TABLET BY MOUTH 2 (TWO) TIMES A DAY AS NEEDED (ANXIETY). 60 tablet 0    Sodium Fluoride 5000 Sensitive 1.1-5 % gel BRUSH WITH EVERY NIGHT. SPIT OUT DO NOT RINSE      SUMAtriptan (IMITREX) 50 MG tablet Take one tablet at onset of headache. May repeat dose one time in 2 hours if headache not relieved. 12 tablet 2    Trelegy Ellipta 100-62.5-25 MCG/ACT inhaler Inhale 1 puff Daily.      venlafaxine XR (EFFEXOR-XR) 150 MG 24 hr capsule Take 1 capsule by mouth Daily.      Vraylar 1.5 MG capsule capsule TAKE 1 CAPSULE BY MOUTH EVERY DAY 90 capsule 1     No current facility-administered medications on file prior to visit.       Allergies  No Known Allergies    Objective  Visit Vitals  /80   Pulse (!) 123 Comment: patient states she has anxiety currently   Ht 170.2 cm (67\")   Wt (!) 147 kg (325 lb)   SpO2 98%   BMI 50.90 kg/m²        Physical Exam  Physical Exam  Vitals and nursing note reviewed.   Constitutional:       General: She is not in acute distress.     Appearance: She is well-developed. She is obese. She is not ill-appearing or toxic-appearing.   HENT:      Head: Normocephalic and atraumatic.   Eyes:      Conjunctiva/sclera: Conjunctivae normal. "   Pulmonary:      Effort: Pulmonary effort is normal. No respiratory distress.   Skin:     General: Skin is warm and dry.      Findings: Rash (discoid rash on L forearm. Appears to be a bruise with overlying petechiae) present.   Neurological:      Mental Status: She is alert and oriented to person, place, and time. Mental status is at baseline.   Psychiatric:         Mood and Affect: Mood normal.         Behavior: Behavior normal.         Thought Content: Thought content normal.         Judgment: Judgment normal.         Results  Results for orders placed or performed in visit on 08/27/24   CBC (No Diff)    Collection Time: 08/27/24 10:17 AM    Specimen: Blood   Result Value Ref Range    WBC 7.30 3.40 - 10.80 10*3/mm3    RBC 5.30 (H) 3.77 - 5.28 10*6/mm3    Hemoglobin 13.7 12.0 - 15.9 g/dL    Hematocrit 42.1 34.0 - 46.6 %    MCV 79.4 79.0 - 97.0 fL    MCH 25.8 (L) 26.6 - 33.0 pg    MCHC 32.5 31.5 - 35.7 g/dL    RDW 13.4 12.3 - 15.4 %    RDW-SD 37.9 37.0 - 54.0 fl    MPV 9.7 6.0 - 12.0 fL    Platelets 403 140 - 450 10*3/mm3   Comprehensive metabolic panel    Collection Time: 08/27/24 10:17 AM    Specimen: Blood   Result Value Ref Range    Glucose 90 65 - 99 mg/dL    BUN 11 6 - 20 mg/dL    Creatinine 0.95 0.57 - 1.00 mg/dL    Sodium 139 136 - 145 mmol/L    Potassium 4.4 3.5 - 5.2 mmol/L    Chloride 102 98 - 107 mmol/L    CO2 24.3 22.0 - 29.0 mmol/L    Calcium 9.9 8.6 - 10.5 mg/dL    Total Protein 7.9 6.0 - 8.5 g/dL    Albumin 4.3 3.5 - 5.2 g/dL    ALT (SGPT) 33 1 - 33 U/L    AST (SGOT) 26 1 - 32 U/L    Alkaline Phosphatase 87 39 - 117 U/L    Total Bilirubin 0.4 0.0 - 1.2 mg/dL    Globulin 3.6 gm/dL    A/G Ratio 1.2 g/dL    BUN/Creatinine Ratio 11.6 7.0 - 25.0    Anion Gap 12.7 5.0 - 15.0 mmol/L    eGFR 84.9 >60.0 mL/min/1.73   Hemoglobin A1c    Collection Time: 08/27/24 10:17 AM    Specimen: Blood   Result Value Ref Range    Hemoglobin A1C 5.30 4.80 - 5.60 %   Lipid Panel    Collection Time: 08/27/24 10:17 AM     Specimen: Blood   Result Value Ref Range    Total Cholesterol 213 (H) 0 - 200 mg/dL    Triglycerides 132 0 - 150 mg/dL    HDL Cholesterol 57 40 - 60 mg/dL    LDL Cholesterol  133 (H) 0 - 100 mg/dL    VLDL Cholesterol 23 5 - 40 mg/dL    LDL/HDL Ratio 2.27    Vitamin D,25-Hydroxy    Collection Time: 08/27/24 10:17 AM    Specimen: Blood   Result Value Ref Range    25 Hydroxy, Vitamin D 59.6 30.0 - 100.0 ng/ml        Assessment and Plan  Diagnoses and all orders for this visit:    Anxiety and depression  - mood worse since off vraylar x2 weeks due to cost. Prefers not to wait until January when insurance will change.  - continue effexor 150mg daily, buspar 5mg TID, propranolol and hydroxyzine PRN. Add abilify 2mg daily. Can increase to 5mg daily if not effective after 1-2 weeks.  - continue counseling    Skin rash  - appears most consistent with trauma such as a pinch however no trauma recalled  - can monitor for resolution or try topical hydrocortisone BID  - if not improving or worsening will refer to dermatology     Health Maintenance  - Pap smear: 6/2022 negative. Hx of ASCUS. GYN High.  - Mammogram: Start screening at age 40.  - Colonoscopy: Start screening at age 45.  - HCV: negative  - Immunizations: COVID and flu UTD. Tdap 2016 per patient. HPV complete per patient. PCV20 UTD.  - Depression screening: negative 4/2024    Return in about 6 weeks (around 1/8/2025) for Follow up mood, rash.

## 2024-12-30 ENCOUNTER — OFFICE VISIT (OUTPATIENT)
Dept: BEHAVIORAL HEALTH | Facility: CLINIC | Age: 24
End: 2024-12-30
Payer: COMMERCIAL

## 2024-12-30 DIAGNOSIS — F32.A DEPRESSION, UNSPECIFIED DEPRESSION TYPE: ICD-10-CM

## 2024-12-30 DIAGNOSIS — F41.1 GENERALIZED ANXIETY DISORDER: Primary | ICD-10-CM

## 2024-12-30 NOTE — PROGRESS NOTES
[facility]                 Follow Up Adult      Follow Up Adult Note     Date:2024   Patient Name: Deysi Ordoñez  : 2000   MRN: [id number]   Time IN: 3:25 pm    Time OUT: 4:01 pm     Referring Provider: Nupur Rios MD    Chief Complaint:      ICD-10-CM ICD-9-CM   1. Generalized anxiety disorder  F41.1 300.02   2. Depression, unspecified depression type  F32.A 311        History of Present Illness:   Deysi Ordoñez is a 24 y.o. female who is being seen today for follow up counseling for anxiety and depression.    Subjective               Patient's Support Network Includes: extended family    Functional Status: Mild impairment       Current Outpatient Medications:     albuterol (ACCUNEB) 1.25 MG/3ML nebulizer solution, Take 3 mL by nebulization Every 6 (Six) Hours As Needed., Disp: , Rfl:     albuterol sulfate  (90 Base) MCG/ACT inhaler, Inhale 1-2 puffs See Admin Instructions. Inhale 1-2 puffs every 4 to 6 hours as needed, Disp: , Rfl:     ARIPiprazole (Abilify) 2 MG tablet, Take 1 tablet by mouth Daily., Disp: 90 tablet, Rfl: 1    busPIRone (BUSPAR) 10 MG tablet, Take 1 tablet by mouth 3 (Three) Times a Day., Disp: 90 tablet, Rfl: 5    cholecalciferol (VITAMIN D3) 25 MCG (1000 UT) tablet, Take 2 tablets by mouth Daily., Disp: , Rfl:     hydrOXYzine (ATARAX) 25 MG tablet, TAKE 1 TABLET BY MOUTH THREE TIMES A DAY AS NEEDED FOR ANXIETY, Disp: 90 tablet, Rfl: 0    ipratropium (ATROVENT) 0.06 % nasal spray, 1-2 SPRAYS EACH NOSTRIL 2 TIMES DAILY AS NEEDED, Disp: , Rfl:     montelukast (SINGULAIR) 10 MG tablet, , Disp: , Rfl:     naproxen (NAPROSYN) 500 MG tablet, Take 1 tablet by mouth 2 (Two) Times a Day As Needed for Headache., Disp: 30 tablet, Rfl: 5    norethindrone-ethinyl estradiol FE (Junel FE ) 1-20 MG-MCG per tablet, Take 1 tablet by mouth Daily., Disp: 90 tablet, Rfl: 3    propranolol (INDERAL) 10 MG tablet, TAKE 1 TABLET  BY MOUTH 2 (TWO) TIMES A DAY AS NEEDED (ANXIETY)., Disp: 60 tablet, Rfl: 0    Sodium Fluoride 5000 Sensitive 1.1-5 % gel, BRUSH WITH EVERY NIGHT. SPIT OUT DO NOT RINSE, Disp: , Rfl:     SUMAtriptan (IMITREX) 50 MG tablet, Take one tablet at onset of headache. May repeat dose one time in 2 hours if headache not relieved., Disp: 12 tablet, Rfl: 2    Trelegy Ellipta 100-62.5-25 MCG/ACT inhaler, Inhale 1 puff Daily., Disp: , Rfl:     venlafaxine XR (EFFEXOR-XR) 150 MG 24 hr capsule, Take 1 capsule by mouth Daily., Disp: 90 capsule, Rfl: 1    No Known Allergies    Objective     Physical Exam:  Vital Signs: There were no vitals filed for this visit.  There is no height or weight on file to calculate BMI.     Mental Status Exam:   Hygiene:   good  Cooperation:  Cooperative  Eye Contact:  Good  Psychomotor Behavior:  Appropriate  Affect:  Full range  Mood: normal  Speech:  Normal  Thought Process:  Goal directed  Thought Content:  Normal  Suicidal:  None  Homicidal:  None  Hallucinations:  None  Delusion:  None  Memory:  Intact  Orientation:  Person, Place, Time, and Situation  Reliability:  good  Insight:  Good  Judgement:  Good  Impulse Control:  Good  Physical/Medical Issues:   See problem list      Assessment / Plan      Diagnosis:  Diagnoses and all orders for this visit:    1. Generalized anxiety disorder (Primary)    2. Depression, unspecified depression type    Treatment plan updated December 30, 2024.  Treatment will focus on patient utilizing coping mechanisms learned in psychotherapy to better manage symptoms of anxiety and depression, a daily  routine which consists of a healthy diet, regular sleep pattern and regular exercise.    Progress toward goal: Not at goal    Prognosis: Good with ongoing treatment    PLAN:  Patient and clinician will continue to utilize a cognitive behavioral intervention which identifies and addresses patient cognitive distortions related to anxiety and depression.  Follow-ups will  occur approximately every 14 days and will last from 45 to 60 minutes in duration.    Safety: No acute safety concerns  Risk Assessment: Risk of self-harm acutely is low. Risk of self-harm chronically is also low, but could be further elevated in the event of treatment noncompliance and/or AODA.    Treatment Plan/Goals: Continue supportive psychotherapy efforts and medications as indicated. Treatment and medication options discussed during today's visit. Patient ackowledged and verbally consented to continue with current treatment plan and was educated on the importance of compliance with treatment and follow-up appointments. Patient seems reasonably able to adhere to treatment plan.      Assisted Patient in processing above session content; acknowledged and normalized patient’s thoughts, feelings, and concerns.  Rationalized patient thought process regarding anxiety and depression.      Allowed Patient to freely discuss issues  without interruption or judgement with unconditional positive regard, active listening skills, and empathy. Therapist provided a safe, confidential environment to facilitate the development of a positive therapeutic relationship and encouraged open, honest communication. Assisted Patient in identifying risk factors which would indicate the need for higher level of care including thoughts to harm self or others and/or self-harming behavior and encouraged Patient to contact this office, call 911, or present to the nearest emergency room should any of these events occur. Discussed crisis intervention services and means to access. Patient adamantly and convincingly denies current suicidal or homicidal ideation or perceptual disturbance. Assisted Patient in processing session content; acknowledged and normalized Patient’s thoughts, feelings, and concerns by utilizing a person-centered approach in efforts to build appropriate rapport and a positive therapeutic relationship with open and honest  communication. .     Part of this note may be an electronic transcription/translation of spoken language to printed text using the Dragon Dictation System.       Follow Up:   Return in about 2 weeks (around 1/13/2025) for Next scheduled follow up.    James Escobar LCSW

## 2024-12-30 NOTE — PLAN OF CARE
Treatment plan updated December 30, 2024.  Treatment will focus on patient utilizing coping mechanisms learned in psychotherapy to better manage symptoms of anxiety and depression, a daily  routine which consists of a healthy diet, regular sleep pattern and regular exercise.

## 2025-01-02 ENCOUNTER — OFFICE VISIT (OUTPATIENT)
Dept: INTERNAL MEDICINE | Facility: CLINIC | Age: 25
End: 2025-01-02
Payer: COMMERCIAL

## 2025-01-02 VITALS
BODY MASS INDEX: 45.99 KG/M2 | HEIGHT: 67 IN | OXYGEN SATURATION: 98 % | DIASTOLIC BLOOD PRESSURE: 80 MMHG | TEMPERATURE: 97.4 F | HEART RATE: 98 BPM | WEIGHT: 293 LBS | RESPIRATION RATE: 18 BRPM | SYSTOLIC BLOOD PRESSURE: 126 MMHG

## 2025-01-02 DIAGNOSIS — F32.A ANXIETY AND DEPRESSION: Primary | ICD-10-CM

## 2025-01-02 DIAGNOSIS — L30.9 DERMATITIS: ICD-10-CM

## 2025-01-02 DIAGNOSIS — F41.9 ANXIETY AND DEPRESSION: Primary | ICD-10-CM

## 2025-01-02 PROCEDURE — 99214 OFFICE O/P EST MOD 30 MIN: CPT | Performed by: INTERNAL MEDICINE

## 2025-01-02 RX ORDER — NYSTATIN AND TRIAMCINOLONE ACETONIDE 100000; 1 [USP'U]/G; MG/G
1 OINTMENT TOPICAL 2 TIMES DAILY
Qty: 30 G | Refills: 0 | Status: SHIPPED | OUTPATIENT
Start: 2025-01-02

## 2025-01-02 NOTE — PROGRESS NOTES
Internal Medicine Follow Up    Chief Complaint  Deysi Ordoñez is a 24 y.o. female who presents today for follow up of chronic medical conditions outlined below.    Chief Complaint   Patient presents with    6w Followup     Mood    Rash        HPI  Ms. Ordoñez comes in today for follow up. Mood generally better with addition of abilify to effexor and buspar. She does note recent stress with school registration. She has been told she is not properly registered for classes and her school insurance is therefore not active. She has been calling several different people to try to resolve this and is still needing to speak with several other offices. She notes rash on her L forearm possibly bigger. No change with hydrocortisone BID x1 week.    Rash    Depression  Presents for follow-up visit. Patient is not experiencing: confusion, depressed mood, excessive worry, hypersomnia, insomnia, irritability, nervousness/anxiety, obsessions, dizziness, malaise/fatigue and difficulty controlling mood.Symptoms occur rarely.  The severity of symptoms is mild.  The symptoms are aggravated by nothing. Her past medical history is significant for depression. Compliance with medications is %.        Review of Systems  Review of Systems   Constitutional: Negative.  Negative for irritability and malaise/fatigue.   Skin:  Positive for rash.   Neurological:  Negative for dizziness and confusion.   Psychiatric/Behavioral:  Positive for stress. Negative for depressed mood. The patient is not nervous/anxious and does not have insomnia.         Current Medications  Current Outpatient Medications on File Prior to Visit   Medication Sig Dispense Refill    albuterol (ACCUNEB) 1.25 MG/3ML nebulizer solution Take 3 mL by nebulization Every 6 (Six) Hours As Needed.      albuterol sulfate  (90 Base) MCG/ACT inhaler Inhale 1-2 puffs See Admin Instructions. Inhale 1-2 puffs every 4 to 6 hours as needed      ARIPiprazole (Abilify) 2 MG  "tablet Take 1 tablet by mouth Daily. 90 tablet 1    busPIRone (BUSPAR) 10 MG tablet Take 1 tablet by mouth 3 (Three) Times a Day. 90 tablet 5    cholecalciferol (VITAMIN D3) 25 MCG (1000 UT) tablet Take 2 tablets by mouth Daily.      hydrOXYzine (ATARAX) 25 MG tablet TAKE 1 TABLET BY MOUTH THREE TIMES A DAY AS NEEDED FOR ANXIETY 90 tablet 0    ipratropium (ATROVENT) 0.06 % nasal spray 1-2 SPRAYS EACH NOSTRIL 2 TIMES DAILY AS NEEDED      montelukast (SINGULAIR) 10 MG tablet       naproxen (NAPROSYN) 500 MG tablet Take 1 tablet by mouth 2 (Two) Times a Day As Needed for Headache. 30 tablet 5    norethindrone-ethinyl estradiol FE (Junel FE 1/20) 1-20 MG-MCG per tablet Take 1 tablet by mouth Daily. 90 tablet 3    propranolol (INDERAL) 10 MG tablet TAKE 1 TABLET BY MOUTH 2 (TWO) TIMES A DAY AS NEEDED (ANXIETY). 60 tablet 0    Sodium Fluoride 5000 Sensitive 1.1-5 % gel BRUSH WITH EVERY NIGHT. SPIT OUT DO NOT RINSE      SUMAtriptan (IMITREX) 50 MG tablet Take one tablet at onset of headache. May repeat dose one time in 2 hours if headache not relieved. 12 tablet 2    Trelegy Ellipta 100-62.5-25 MCG/ACT inhaler Inhale 1 puff Daily.      venlafaxine XR (EFFEXOR-XR) 150 MG 24 hr capsule Take 1 capsule by mouth Daily. 90 capsule 1     No current facility-administered medications on file prior to visit.       Allergies  No Known Allergies    Objective  Visit Vitals  /80   Pulse 98   Temp 97.4 °F (36.3 °C) (Temporal)   Resp 18   Ht 170.2 cm (67.01\")   Wt (!) 148 kg (326 lb 9.6 oz)   SpO2 98%   BMI 51.14 kg/m²        Physical Exam  Physical Exam  Vitals and nursing note reviewed.   Constitutional:       General: She is not in acute distress.     Appearance: She is well-developed. She is obese. She is not ill-appearing or toxic-appearing.   HENT:      Head: Normocephalic and atraumatic.   Eyes:      Conjunctiva/sclera: Conjunctivae normal.   Pulmonary:      Effort: Pulmonary effort is normal. No respiratory distress. "   Skin:     General: Skin is warm and dry.      Findings: Rash (discoid rash on L forearm with possible scale) present.   Neurological:      Mental Status: She is alert and oriented to person, place, and time. Mental status is at baseline.      Gait: Gait normal.   Psychiatric:         Mood and Affect: Mood normal.         Behavior: Behavior normal.         Thought Content: Thought content normal.         Judgment: Judgment normal.         Results  Results for orders placed or performed in visit on 08/27/24   CBC (No Diff)    Collection Time: 08/27/24 10:17 AM    Specimen: Blood   Result Value Ref Range    WBC 7.30 3.40 - 10.80 10*3/mm3    RBC 5.30 (H) 3.77 - 5.28 10*6/mm3    Hemoglobin 13.7 12.0 - 15.9 g/dL    Hematocrit 42.1 34.0 - 46.6 %    MCV 79.4 79.0 - 97.0 fL    MCH 25.8 (L) 26.6 - 33.0 pg    MCHC 32.5 31.5 - 35.7 g/dL    RDW 13.4 12.3 - 15.4 %    RDW-SD 37.9 37.0 - 54.0 fl    MPV 9.7 6.0 - 12.0 fL    Platelets 403 140 - 450 10*3/mm3   Comprehensive metabolic panel    Collection Time: 08/27/24 10:17 AM    Specimen: Blood   Result Value Ref Range    Glucose 90 65 - 99 mg/dL    BUN 11 6 - 20 mg/dL    Creatinine 0.95 0.57 - 1.00 mg/dL    Sodium 139 136 - 145 mmol/L    Potassium 4.4 3.5 - 5.2 mmol/L    Chloride 102 98 - 107 mmol/L    CO2 24.3 22.0 - 29.0 mmol/L    Calcium 9.9 8.6 - 10.5 mg/dL    Total Protein 7.9 6.0 - 8.5 g/dL    Albumin 4.3 3.5 - 5.2 g/dL    ALT (SGPT) 33 1 - 33 U/L    AST (SGOT) 26 1 - 32 U/L    Alkaline Phosphatase 87 39 - 117 U/L    Total Bilirubin 0.4 0.0 - 1.2 mg/dL    Globulin 3.6 gm/dL    A/G Ratio 1.2 g/dL    BUN/Creatinine Ratio 11.6 7.0 - 25.0    Anion Gap 12.7 5.0 - 15.0 mmol/L    eGFR 84.9 >60.0 mL/min/1.73   Hemoglobin A1c    Collection Time: 08/27/24 10:17 AM    Specimen: Blood   Result Value Ref Range    Hemoglobin A1C 5.30 4.80 - 5.60 %   Lipid Panel    Collection Time: 08/27/24 10:17 AM    Specimen: Blood   Result Value Ref Range    Total Cholesterol 213 (H) 0 - 200 mg/dL     Triglycerides 132 0 - 150 mg/dL    HDL Cholesterol 57 40 - 60 mg/dL    LDL Cholesterol  133 (H) 0 - 100 mg/dL    VLDL Cholesterol 23 5 - 40 mg/dL    LDL/HDL Ratio 2.27    Vitamin D,25-Hydroxy    Collection Time: 08/27/24 10:17 AM    Specimen: Blood   Result Value Ref Range    25 Hydroxy, Vitamin D 59.6 30.0 - 100.0 ng/ml        Assessment and Plan  Diagnoses and all orders for this visit:    Anxiety and depression  - mood improved. Has found abilify as effective as vraylar.  - continue effexor 150mg daily, buspar 5mg TID, abilify 2mg daily, propranolol and hydroxyzine PRN.  - continue counseling    Dermatitis  - possibly worse, no improvement with hydrocortisone  - will try topical nystatin-triamcinolone BID x2 weeks  - dermatology referral placed today in case there is no improvement     Health Maintenance  - Pap smear: 6/2022 negative. Hx of ASCUS. GYN High.  - Mammogram: Start screening at age 40.  - Colonoscopy: Start screening at age 45.  - HCV: negative  - Immunizations: COVID and flu UTD. Tdap 2016 per patient. HPV complete per patient. PCV20 UTD.  - Depression screening: negative 4/2024    Return in about 3 months (around 4/2/2025) for Follow up mood.

## 2025-01-22 ENCOUNTER — OFFICE VISIT (OUTPATIENT)
Dept: BEHAVIORAL HEALTH | Facility: CLINIC | Age: 25
End: 2025-01-22
Payer: COMMERCIAL

## 2025-01-22 DIAGNOSIS — F32.A DEPRESSION, UNSPECIFIED DEPRESSION TYPE: ICD-10-CM

## 2025-01-22 DIAGNOSIS — F41.1 GENERALIZED ANXIETY DISORDER: Primary | ICD-10-CM

## 2025-01-22 NOTE — PROGRESS NOTES
[facility] [place]                 Follow Up Adult      Follow Up Adult Note     Date:2025   Patient Name: Deysi Ordoñez  : 2000   MRN: [id number]   Time IN: 4:22 pm    Time OUT: 5:05 pm     Referring Provider: Nupur Rios MD    Chief Complaint:      ICD-10-CM ICD-9-CM   1. Generalized anxiety disorder  F41.1 300.02   2. Depression, unspecified depression type  F32.A 311        History of Present Illness:   Deysi Ordoñez is a 24 y.o. female who is being seen today for follow up counseling for GARRETT and depression.    Subjective               Patient's Support Network Includes: extended family    Functional Status: Mild impairment       Current Outpatient Medications:     albuterol (ACCUNEB) 1.25 MG/3ML nebulizer solution, Take 3 mL by nebulization Every 6 (Six) Hours As Needed., Disp: , Rfl:     albuterol sulfate  (90 Base) MCG/ACT inhaler, Inhale 1-2 puffs See Admin Instructions. Inhale 1-2 puffs every 4 to 6 hours as needed, Disp: , Rfl:     ARIPiprazole (Abilify) 2 MG tablet, Take 1 tablet by mouth Daily., Disp: 90 tablet, Rfl: 1    busPIRone (BUSPAR) 10 MG tablet, Take 1 tablet by mouth 3 (Three) Times a Day., Disp: 90 tablet, Rfl: 5    cholecalciferol (VITAMIN D3) 25 MCG (1000 UT) tablet, Take 2 tablets by mouth Daily., Disp: , Rfl:     hydrOXYzine (ATARAX) 25 MG tablet, TAKE 1 TABLET BY MOUTH THREE TIMES A DAY AS NEEDED FOR ANXIETY, Disp: 90 tablet, Rfl: 0    ipratropium (ATROVENT) 0.06 % nasal spray, 1-2 SPRAYS EACH NOSTRIL 2 TIMES DAILY AS NEEDED, Disp: , Rfl:     montelukast (SINGULAIR) 10 MG tablet, , Disp: , Rfl:     naproxen (NAPROSYN) 500 MG tablet, Take 1 tablet by mouth 2 (Two) Times a Day As Needed for Headache., Disp: 30 tablet, Rfl: 5    norethindrone-ethinyl estradiol FE (Junel FE ) 1-20 MG-MCG per tablet, Take 1 tablet by mouth Daily., Disp: 90 tablet, Rfl: 3    nystatin-triamcinolone (MYCOLOG) 751624-3.1 UNIT/GM-%  ointment, Apply 1 Application topically to the appropriate area as directed 2 (Two) Times a Day., Disp: 30 g, Rfl: 0    propranolol (INDERAL) 10 MG tablet, TAKE 1 TABLET BY MOUTH 2 (TWO) TIMES A DAY AS NEEDED (ANXIETY)., Disp: 60 tablet, Rfl: 0    Sodium Fluoride 5000 Sensitive 1.1-5 % gel, BRUSH WITH EVERY NIGHT. SPIT OUT DO NOT RINSE, Disp: , Rfl:     SUMAtriptan (IMITREX) 50 MG tablet, Take one tablet at onset of headache. May repeat dose one time in 2 hours if headache not relieved., Disp: 12 tablet, Rfl: 2    Trelegy Ellipta 100-62.5-25 MCG/ACT inhaler, Inhale 1 puff Daily., Disp: , Rfl:     venlafaxine XR (EFFEXOR-XR) 150 MG 24 hr capsule, Take 1 capsule by mouth Daily., Disp: 90 capsule, Rfl: 1    No Known Allergies    Objective     Physical Exam:  Vital Signs: There were no vitals filed for this visit.  There is no height or weight on file to calculate BMI.     Mental Status Exam:   Hygiene:   good  Cooperation:  Cooperative  Eye Contact:  Good  Psychomotor Behavior:  Appropriate  Affect:  Full range  Mood: normal  Speech:  Normal  Thought Process:  Goal directed  Thought Content:  Normal  Suicidal:  None  Homicidal:  None  Hallucinations:  None  Delusion:  None  Memory:  Intact  Orientation:  Person, Place, Time, and Situation  Reliability:  good  Insight:  Good  Judgement:  Good  Impulse Control:  Good  Physical/Medical Issues:   See problem list     Assessment / Plan      Diagnosis:  Diagnoses and all orders for this visit:    1. Generalized anxiety disorder (Primary)    2. Depression, unspecified depression type    Patient presented for follow-up with clinician.  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  Patient stated that they rated their confidence in completing the program at currently a 4 out of 10.  Patient stated that they had struggled to understanding complete work the second week after returning.  Patient stated they had  subsequently collaborated with their boss and change strategies and topics.  Patient stated that this had given them a boost of confidence.  Patient and clinician identified and addressed patient cognitive distortions related to anxiety.  Clinician utilized active listening and reflective response to convey empathy and support.    Progress toward goal: Not at goal    Prognosis: Good with ongoing treatment    PLAN:  Patient and clinician will continue to utilize a cognitive behavioral intervention which identifies and addresses patient cognitive distortions related to anxiety and depression.  Follow-ups will occur every 21 days and will last for 45 to 60 minutes in duration.    Safety: No acute safety concerns  Risk Assessment: Risk of self-harm acutely is low. Risk of self-harm chronically is also low, but could be further elevated in the event of treatment noncompliance and/or AODA.    Treatment Plan/Goals: Continue supportive psychotherapy efforts and medications as indicated. Treatment and medication options discussed during today's visit. Patient ackowledged and verbally consented to continue with current treatment plan and was educated on the importance of compliance with treatment and follow-up appointments. Patient seems reasonably able to adhere to treatment plan.      Assisted Patient in processing above session content; acknowledged and normalized patient’s thoughts, feelings, and concerns.  Rationalized patient thought process regarding anxiety and depression.      Allowed Patient to freely discuss issues  without interruption or judgement with unconditional positive regard, active listening skills, and empathy. Therapist provided a safe, confidential environment to facilitate the development of a positive therapeutic relationship and encouraged open, honest communication. Assisted Patient in identifying risk factors which would indicate the need for higher level of care including thoughts to harm self or  others and/or self-harming behavior and encouraged Patient to contact this office, call 911, or present to the nearest emergency room should any of these events occur. Discussed crisis intervention services and means to access. Patient adamantly and convincingly denies current suicidal or homicidal ideation or perceptual disturbance. Assisted Patient in processing session content; acknowledged and normalized Patient’s thoughts, feelings, and concerns by utilizing a person-centered approach in efforts to build appropriate rapport and a positive therapeutic relationship with open and honest communication. .     Part of this note may be an electronic transcription/translation of spoken language to printed text using the Dragon Dictation System.       Follow Up:   Return in about 2 weeks (around 2/5/2025) for Next scheduled follow up.    James Escobar LCSW

## 2025-02-01 ENCOUNTER — PATIENT ROUNDING (BHMG ONLY) (OUTPATIENT)
Dept: URGENT CARE | Facility: CLINIC | Age: 25
End: 2025-02-01
Payer: COMMERCIAL

## 2025-02-05 ENCOUNTER — OFFICE VISIT (OUTPATIENT)
Dept: BEHAVIORAL HEALTH | Facility: CLINIC | Age: 25
End: 2025-02-05
Payer: COMMERCIAL

## 2025-02-05 DIAGNOSIS — F41.1 GENERALIZED ANXIETY DISORDER: Primary | ICD-10-CM

## 2025-02-05 DIAGNOSIS — F32.A DEPRESSION, UNSPECIFIED DEPRESSION TYPE: ICD-10-CM

## 2025-02-05 NOTE — PROGRESS NOTES
[facility]                 Follow Up Adult      Follow Up Adult Note     Date:2025   Patient Name: Deysi Ordoñez  : 2000   MRN: [id number]   Time IN: 4:30 pm    Time OUT: 5:05 pm     Referring Provider: Nupur Rios MD    Chief Complaint:      ICD-10-CM ICD-9-CM   1. Generalized anxiety disorder  F41.1 300.02   2. Depression, unspecified depression type  F32.A 311        History of Present Illness:   Deysi Ordoñez is a 24 y.o. female who is being seen today for follow up counseling for anxiety and depression.    Subjective               Patient's Support Network Includes: extended family    Functional Status: Mild impairment       Current Outpatient Medications:     albuterol (ACCUNEB) 1.25 MG/3ML nebulizer solution, Take 3 mL by nebulization Every 6 (Six) Hours As Needed., Disp: , Rfl:     albuterol sulfate  (90 Base) MCG/ACT inhaler, Inhale 1-2 puffs See Admin Instructions. Inhale 1-2 puffs every 4 to 6 hours as needed, Disp: , Rfl:     ARIPiprazole (Abilify) 2 MG tablet, Take 1 tablet by mouth Daily., Disp: 90 tablet, Rfl: 1    benzonatate (TESSALON) 200 MG capsule, Take 1 capsule by mouth 3 (Three) Times a Day As Needed for Cough., Disp: 15 capsule, Rfl: 0    busPIRone (BUSPAR) 10 MG tablet, Take 1 tablet by mouth 3 (Three) Times a Day., Disp: 90 tablet, Rfl: 5    cholecalciferol (VITAMIN D3) 25 MCG (1000 UT) tablet, Take 2 tablets by mouth Daily., Disp: , Rfl:     hydrOXYzine (ATARAX) 25 MG tablet, TAKE 1 TABLET BY MOUTH THREE TIMES A DAY AS NEEDED FOR ANXIETY, Disp: 90 tablet, Rfl: 0    ipratropium (ATROVENT) 0.06 % nasal spray, 1-2 SPRAYS EACH NOSTRIL 2 TIMES DAILY AS NEEDED, Disp: , Rfl:     methylPREDNISolone (MEDROL) 4 MG dose pack, Take as directed on package instructions., Disp: 21 tablet, Rfl: 0    montelukast (SINGULAIR) 10 MG tablet, , Disp: , Rfl:     naproxen (NAPROSYN) 500 MG tablet, Take 1 tablet by mouth 2  (Two) Times a Day As Needed for Headache., Disp: 30 tablet, Rfl: 5    norethindrone-ethinyl estradiol FE (Junel FE 1/20) 1-20 MG-MCG per tablet, Take 1 tablet by mouth Daily., Disp: 90 tablet, Rfl: 3    oseltamivir (TAMIFLU) 75 MG capsule, Take 1 capsule by mouth 2 (Two) Times a Day for 5 days., Disp: 10 capsule, Rfl: 0    propranolol (INDERAL) 10 MG tablet, TAKE 1 TABLET BY MOUTH 2 (TWO) TIMES A DAY AS NEEDED (ANXIETY)., Disp: 60 tablet, Rfl: 0    Sodium Fluoride 5000 Sensitive 1.1-5 % gel, BRUSH WITH EVERY NIGHT. SPIT OUT DO NOT RINSE, Disp: , Rfl:     SUMAtriptan (IMITREX) 50 MG tablet, Take one tablet at onset of headache. May repeat dose one time in 2 hours if headache not relieved., Disp: 12 tablet, Rfl: 2    Trelegy Ellipta 100-62.5-25 MCG/ACT inhaler, Inhale 1 puff Daily., Disp: , Rfl:     venlafaxine XR (EFFEXOR-XR) 150 MG 24 hr capsule, Take 1 capsule by mouth Daily., Disp: 90 capsule, Rfl: 1    No Known Allergies    Objective     Physical Exam:  Vital Signs: There were no vitals filed for this visit.  There is no height or weight on file to calculate BMI.     Mental Status Exam:   Hygiene:   good  Cooperation:  Cooperative  Eye Contact:  Good  Psychomotor Behavior:  Appropriate  Affect:  Full range  Mood: normal  Speech:  Normal  Thought Process:  Goal directed  Thought Content:  Normal  Suicidal:  None  Homicidal:  None  Hallucinations:  None  Delusion:  None  Memory:  Intact  Orientation:  Person, Place, Time, and Situation  Reliability:  good  Insight:  Good  Judgement:  Good  Impulse Control:  Good  Physical/Medical Issues:   See problem list      Assessment / Plan      Diagnosis:  Diagnoses and all orders for this visit:    1. Generalized anxiety disorder (Primary)    2. Depression, unspecified depression type    Patient presented for follow-up with clinician.  Patient and clinician identified and addressed patient cognitive distortions related to anxiety.  Patient was responsive to intervention.   Clinician utilized active listening and reflective response to convey empathy and support.    Progress toward goal: Not at goal    Prognosis: Good with ongoing treatment    PLAN:  Patient clinician will continue to utilize a cognitive behavioral intervention which identifies and addresses patient cognitive distortions related to anxiety and depression.  Follow-ups will occur every 21 days and will last from 45 to 60 minutes in duration.    Safety: No acute safety concerns  Risk Assessment: Risk of self-harm acutely is low. Risk of self-harm chronically is also low, but could be further elevated in the event of treatment noncompliance and/or AODA.    Treatment Plan/Goals: Continue supportive psychotherapy efforts and medications as indicated. Treatment and medication options discussed during today's visit. Patient ackowledged and verbally consented to continue with current treatment plan and was educated on the importance of compliance with treatment and follow-up appointments. Patient seems reasonably able to adhere to treatment plan.      Assisted Patient in processing above session content; acknowledged and normalized patient’s thoughts, feelings, and concerns.  Rationalized patient thought process regarding anxiety and depression.      Allowed Patient to freely discuss issues  without interruption or judgement with unconditional positive regard, active listening skills, and empathy. Therapist provided a safe, confidential environment to facilitate the development of a positive therapeutic relationship and encouraged open, honest communication. Assisted Patient in identifying risk factors which would indicate the need for higher level of care including thoughts to harm self or others and/or self-harming behavior and encouraged Patient to contact this office, call 911, or present to the nearest emergency room should any of these events occur. Discussed crisis intervention services and means to access. Patient  adamantly and convincingly denies current suicidal or homicidal ideation or perceptual disturbance. Assisted Patient in processing session content; acknowledged and normalized Patient’s thoughts, feelings, and concerns by utilizing a person-centered approach in efforts to build appropriate rapport and a positive therapeutic relationship with open and honest communication. .     Part of this note may be an electronic transcription/translation of spoken language to printed text using the Dragon Dictation System.       Follow Up:   Return in about 3 weeks (around 2/26/2025) for Next scheduled follow up.    James Escobar LCSW

## 2025-02-16 DIAGNOSIS — F41.9 ANXIETY AND DEPRESSION: ICD-10-CM

## 2025-02-16 DIAGNOSIS — F32.A ANXIETY AND DEPRESSION: ICD-10-CM

## 2025-02-16 RX ORDER — BUSPIRONE HYDROCHLORIDE 10 MG/1
10 TABLET ORAL 3 TIMES DAILY
Qty: 90 TABLET | Refills: 5 | Status: SHIPPED | OUTPATIENT
Start: 2025-02-16

## 2025-02-25 ENCOUNTER — OFFICE VISIT (OUTPATIENT)
Dept: BEHAVIORAL HEALTH | Facility: CLINIC | Age: 25
End: 2025-02-25
Payer: COMMERCIAL

## 2025-02-25 DIAGNOSIS — F32.A DEPRESSION, UNSPECIFIED DEPRESSION TYPE: ICD-10-CM

## 2025-02-25 DIAGNOSIS — F41.1 GENERALIZED ANXIETY DISORDER: Primary | ICD-10-CM

## 2025-02-25 NOTE — PROGRESS NOTES
[facility]                 Follow Up Adult      Follow Up Adult Note     Date:2025   Patient Name: Deysi Ordoñez  : 2000   MRN: [id number]   Time IN: 4:30 pm    Time OUT: 5:10 pm     Referring Provider: Nupur Rios MD    Chief Complaint:      ICD-10-CM ICD-9-CM   1. Generalized anxiety disorder  F41.1 300.02   2. Depression, unspecified depression type  F32.A 311        History of Present Illness:   Deysi Ordoñez is a 24 y.o. female who is being seen today for follow up counseling for anxiety and depression.    Subjective               Patient's Support Network Includes: extended family    Functional Status: Mild impairment       Current Outpatient Medications:     albuterol (ACCUNEB) 1.25 MG/3ML nebulizer solution, Take 3 mL by nebulization Every 6 (Six) Hours As Needed., Disp: , Rfl:     albuterol sulfate  (90 Base) MCG/ACT inhaler, Inhale 1-2 puffs See Admin Instructions. Inhale 1-2 puffs every 4 to 6 hours as needed, Disp: , Rfl:     ARIPiprazole (Abilify) 2 MG tablet, Take 1 tablet by mouth Daily., Disp: 90 tablet, Rfl: 1    benzonatate (TESSALON) 200 MG capsule, Take 1 capsule by mouth 3 (Three) Times a Day As Needed for Cough., Disp: 15 capsule, Rfl: 0    busPIRone (BUSPAR) 10 MG tablet, TAKE 1 TABLET BY MOUTH THREE TIMES A DAY, Disp: 90 tablet, Rfl: 5    cholecalciferol (VITAMIN D3) 25 MCG (1000 UT) tablet, Take 2 tablets by mouth Daily., Disp: , Rfl:     hydrOXYzine (ATARAX) 25 MG tablet, TAKE 1 TABLET BY MOUTH THREE TIMES A DAY AS NEEDED FOR ANXIETY, Disp: 90 tablet, Rfl: 0    ipratropium (ATROVENT) 0.06 % nasal spray, 1-2 SPRAYS EACH NOSTRIL 2 TIMES DAILY AS NEEDED, Disp: , Rfl:     methylPREDNISolone (MEDROL) 4 MG dose pack, Take as directed on package instructions., Disp: 21 tablet, Rfl: 0    montelukast (SINGULAIR) 10 MG tablet, , Disp: , Rfl:     naproxen (NAPROSYN) 500 MG tablet, Take 1 tablet by mouth 2 (Two)  Times a Day As Needed for Headache., Disp: 30 tablet, Rfl: 5    norethindrone-ethinyl estradiol FE (Junel FE 1/20) 1-20 MG-MCG per tablet, Take 1 tablet by mouth Daily., Disp: 90 tablet, Rfl: 3    propranolol (INDERAL) 10 MG tablet, TAKE 1 TABLET BY MOUTH 2 (TWO) TIMES A DAY AS NEEDED (ANXIETY)., Disp: 60 tablet, Rfl: 0    Sodium Fluoride 5000 Sensitive 1.1-5 % gel, BRUSH WITH EVERY NIGHT. SPIT OUT DO NOT RINSE, Disp: , Rfl:     SUMAtriptan (IMITREX) 50 MG tablet, Take one tablet at onset of headache. May repeat dose one time in 2 hours if headache not relieved., Disp: 12 tablet, Rfl: 2    Trelegy Ellipta 100-62.5-25 MCG/ACT inhaler, Inhale 1 puff Daily., Disp: , Rfl:     venlafaxine XR (EFFEXOR-XR) 150 MG 24 hr capsule, Take 1 capsule by mouth Daily., Disp: 90 capsule, Rfl: 1    No Known Allergies    Objective     Physical Exam:  Vital Signs: There were no vitals filed for this visit.  There is no height or weight on file to calculate BMI.     Mental Status Exam:   Hygiene:   good  Cooperation:  Cooperative  Eye Contact:  Good  Psychomotor Behavior:  Appropriate  Affect:  Full range  Mood: normal  Speech:  Normal  Thought Process:  Goal directed  Thought Content:  Normal  Suicidal:  None  Homicidal:  None  Hallucinations:  None  Delusion:  None  Memory:  Intact  Orientation:  Person, Place, Time, and Situation  Reliability:  good  Insight:  Good  Judgement:  Good  Impulse Control:  Good  Physical/Medical Issues:   See problem list      Assessment / Plan      Diagnosis:  Diagnoses and all orders for this visit:    1. Generalized anxiety disorder (Primary)    2. Depression, unspecified depression type    Patient presented for follow-up with clinician.  Patient and clinician reviewed patient completed thought log.  Patient and clinician identified and addressed patient cognitive distortions related to low self-esteem.  Patient was responsive to intervention.  Clinician utilized active listening and reflective  responsive empathy and support.    Progress toward goal: Not at goal    Prognosis: Good with ongoing treatment    PLAN:  Patient and clinician will continue to utilize a cognitive behavioral intervention to identify addressed patient cognitive distortions related to low self-esteem and low self-image.  Follow-ups will occur every 21 days and will last for 45 to 60 minutes in duration.    Safety: No acute safety concerns  Risk Assessment: Risk of self-harm acutely is low. Risk of self-harm chronically is also low, but could be further elevated in the event of treatment noncompliance and/or AODA.    Treatment Plan/Goals: Continue supportive psychotherapy efforts and medications as indicated. Treatment and medication options discussed during today's visit. Patient ackowledged and verbally consented to continue with current treatment plan and was educated on the importance of compliance with treatment and follow-up appointments. Patient seems reasonably able to adhere to treatment plan.      Assisted Patient in processing above session content; acknowledged and normalized patient’s thoughts, feelings, and concerns.  Rationalized patient thought process regarding anxiety and depression.      Allowed Patient to freely discuss issues  without interruption or judgement with unconditional positive regard, active listening skills, and empathy. Therapist provided a safe, confidential environment to facilitate the development of a positive therapeutic relationship and encouraged open, honest communication. Assisted Patient in identifying risk factors which would indicate the need for higher level of care including thoughts to harm self or others and/or self-harming behavior and encouraged Patient to contact this office, call 911, or present to the nearest emergency room should any of these events occur. Discussed crisis intervention services and means to access. Patient adamantly and convincingly denies current suicidal or  homicidal ideation or perceptual disturbance. Assisted Patient in processing session content; acknowledged and normalized Patient’s thoughts, feelings, and concerns by utilizing a person-centered approach in efforts to build appropriate rapport and a positive therapeutic relationship with open and honest communication. .     Part of this note may be an electronic transcription/translation of spoken language to printed text using the Dragon Dictation System.       Follow Up:   Return in about 3 weeks (around 3/18/2025) for Next scheduled follow up.    James Escobar LCSW

## 2025-03-14 DIAGNOSIS — F41.9 ANXIETY AND DEPRESSION: ICD-10-CM

## 2025-03-14 DIAGNOSIS — F32.A ANXIETY AND DEPRESSION: ICD-10-CM

## 2025-03-14 RX ORDER — BUSPIRONE HYDROCHLORIDE 10 MG/1
10 TABLET ORAL 3 TIMES DAILY
Qty: 90 TABLET | Refills: 5 | OUTPATIENT
Start: 2025-03-14

## 2025-03-26 ENCOUNTER — PATIENT ROUNDING (BHMG ONLY) (OUTPATIENT)
Dept: URGENT CARE | Facility: CLINIC | Age: 25
End: 2025-03-26
Payer: COMMERCIAL

## 2025-03-30 ENCOUNTER — PATIENT ROUNDING (BHMG ONLY) (OUTPATIENT)
Dept: URGENT CARE | Facility: CLINIC | Age: 25
End: 2025-03-30
Payer: COMMERCIAL

## 2025-04-01 ENCOUNTER — OFFICE VISIT (OUTPATIENT)
Dept: INTERNAL MEDICINE | Facility: CLINIC | Age: 25
End: 2025-04-01
Payer: COMMERCIAL

## 2025-04-01 VITALS
HEART RATE: 90 BPM | SYSTOLIC BLOOD PRESSURE: 124 MMHG | BODY MASS INDEX: 44.41 KG/M2 | HEIGHT: 68 IN | WEIGHT: 293 LBS | DIASTOLIC BLOOD PRESSURE: 88 MMHG | TEMPERATURE: 97.4 F | OXYGEN SATURATION: 96 %

## 2025-04-01 DIAGNOSIS — E66.813 CLASS 3 SEVERE OBESITY DUE TO EXCESS CALORIES WITHOUT SERIOUS COMORBIDITY WITH BODY MASS INDEX (BMI) OF 45.0 TO 49.9 IN ADULT: ICD-10-CM

## 2025-04-01 DIAGNOSIS — I78.8 CAPILLARITIS: ICD-10-CM

## 2025-04-01 DIAGNOSIS — F32.A ANXIETY AND DEPRESSION: Primary | ICD-10-CM

## 2025-04-01 DIAGNOSIS — M79.632 LEFT FOREARM PAIN: ICD-10-CM

## 2025-04-01 DIAGNOSIS — E66.01 CLASS 3 SEVERE OBESITY DUE TO EXCESS CALORIES WITHOUT SERIOUS COMORBIDITY WITH BODY MASS INDEX (BMI) OF 45.0 TO 49.9 IN ADULT: ICD-10-CM

## 2025-04-01 DIAGNOSIS — F41.9 ANXIETY AND DEPRESSION: Primary | ICD-10-CM

## 2025-04-01 PROCEDURE — 99214 OFFICE O/P EST MOD 30 MIN: CPT | Performed by: INTERNAL MEDICINE

## 2025-04-01 NOTE — PROGRESS NOTES
Internal Medicine Follow Up    Chief Complaint  Deysi Ordoñez is a 24 y.o. female who presents today for follow up of chronic medical conditions outlined below.    Chief Complaint   Patient presents with    Anxiety    Depression    Arm Pain     Random pain in LT arm for a few days.        HPI  Ms. Ordoñez comes in today for follow up. Mood remains controlled. She continues counseling. She did see dermatology for rash on L forearm and was diagnosed with capillaritis. She was given a new steroid cream which did resolve the rash however she now notes it returning and potentially a new spot as well. She plans to resume the steroid cream. She has been sick several times recently. Flu A in January and again about a week ago. On augmentin currently. She notes that over the last 2 days she has had L forearm pain. No known injury. Has been in bed due to illness. No weakness, numbness, swelling. Pain does not occur with use of the arm but is present with palpation. She notes interested in weight loss medication. Up until now has not been following strict diet or getting much exercise. Insurance does cover zepbound after 6 months of physician monitored weight loss.    Anxiety   Patient reports no chest pain or nausea. Her past medical history is significant for depression.   DepressionPatient is not experiencing: chest pain and nausea.  Her past medical history is significant for depression.   Arm Pain   Pertinent negatives include no chest pain or numbness.     Med Check  Symptoms are: new.   Onset was in the past 7 days.   Symptoms occur: constantly.  Symptoms include: anorexia, chills, nasal congestion, cough, myalgias, neck pain, sore throat and swollen glands.   Pertinent negative symptoms include no abdominal pain, no joint pain, no change in stool, no chest pain, no diaphoresis, no fatigue, no fever, no headaches, no joint swelling, no nausea, no numbness, no rash, no dysuria, no vertigo, no visual change, no  vomiting and no weakness.   Treatment and/or Medications comments include: Tamiflu   Additional information: Tested positive for flu on 3/25. Still feeling really bad.       Review of Systems  Review of Systems   Constitutional:  Positive for chills. Negative for diaphoresis, fatigue and fever.   HENT:  Positive for congestion, hearing loss, sore throat and swollen glands. Negative for ear pain.    Respiratory:  Positive for cough.    Cardiovascular:  Negative for chest pain.   Gastrointestinal:  Positive for anorexia. Negative for abdominal pain, nausea and vomiting.   Genitourinary:  Negative for dysuria.   Musculoskeletal:  Positive for myalgias and neck pain. Negative for joint pain.   Skin:  Negative for rash.   Neurological:  Negative for vertigo, weakness and numbness.   Psychiatric/Behavioral: Negative.          Current Medications  Current Outpatient Medications on File Prior to Visit   Medication Sig Dispense Refill    albuterol (ACCUNEB) 1.25 MG/3ML nebulizer solution Take 3 mL by nebulization Every 6 (Six) Hours As Needed.      albuterol sulfate  (90 Base) MCG/ACT inhaler Inhale 1-2 puffs See Admin Instructions. Inhale 1-2 puffs every 4 to 6 hours as needed      amoxicillin-clavulanate (AUGMENTIN) 875-125 MG per tablet Take 1 tablet by mouth 2 (Two) Times a Day for 7 days. 14 tablet 0    ARIPiprazole (Abilify) 2 MG tablet Take 1 tablet by mouth Daily. 90 tablet 1    busPIRone (BUSPAR) 10 MG tablet TAKE 1 TABLET BY MOUTH THREE TIMES A DAY 90 tablet 5    cholecalciferol (VITAMIN D3) 25 MCG (1000 UT) tablet Take 2 tablets by mouth Daily.      hydrOXYzine (ATARAX) 25 MG tablet TAKE 1 TABLET BY MOUTH THREE TIMES A DAY AS NEEDED FOR ANXIETY 90 tablet 0    ipratropium (ATROVENT) 0.06 % nasal spray 1-2 SPRAYS EACH NOSTRIL 2 TIMES DAILY AS NEEDED      montelukast (SINGULAIR) 10 MG tablet       naproxen (NAPROSYN) 500 MG tablet Take 1 tablet by mouth 2 (Two) Times a Day As Needed for Headache. 30 tablet 5  "   norethindrone-ethinyl estradiol FE (Junel FE 1/20) 1-20 MG-MCG per tablet Take 1 tablet by mouth Daily. 90 tablet 3    propranolol (INDERAL) 10 MG tablet TAKE 1 TABLET BY MOUTH 2 (TWO) TIMES A DAY AS NEEDED (ANXIETY). 60 tablet 0    Sodium Fluoride 5000 Sensitive 1.1-5 % gel BRUSH WITH EVERY NIGHT. SPIT OUT DO NOT RINSE      SUMAtriptan (IMITREX) 50 MG tablet Take one tablet at onset of headache. May repeat dose one time in 2 hours if headache not relieved. 12 tablet 2    Trelegy Ellipta 100-62.5-25 MCG/ACT inhaler Inhale 1 puff Daily.      venlafaxine XR (EFFEXOR-XR) 150 MG 24 hr capsule Take 1 capsule by mouth Daily. 90 capsule 1     No current facility-administered medications on file prior to visit.       Allergies  No Known Allergies    Objective  Visit Vitals  /88 (BP Location: Left arm, Patient Position: Sitting)   Pulse 90   Temp 97.4 °F (36.3 °C) (Temporal)   Ht 172.7 cm (68\")   Wt (!) 146 kg (320 lb 12.8 oz)   SpO2 96%   BMI 48.78 kg/m²        Physical Exam  Physical Exam  Vitals and nursing note reviewed.   Constitutional:       General: She is not in acute distress.     Appearance: She is well-developed. She is obese. She is not ill-appearing or toxic-appearing.   HENT:      Head: Normocephalic and atraumatic.   Eyes:      Conjunctiva/sclera: Conjunctivae normal.   Pulmonary:      Effort: Pulmonary effort is normal. No respiratory distress.   Musculoskeletal:         General: Tenderness (medial L forearm near antecubital fossa) present. No swelling or deformity.   Skin:     General: Skin is warm and dry.      Findings: Rash (faint petechial appearing rash on L forearm) present.   Neurological:      Mental Status: She is alert and oriented to person, place, and time. Mental status is at baseline.      Gait: Gait normal.   Psychiatric:         Mood and Affect: Mood normal.         Behavior: Behavior normal.         Thought Content: Thought content normal.         Judgment: Judgment normal. "         Results  Results for orders placed or performed during the hospital encounter of 03/29/25   POC Rapid Strep A    Collection Time: 03/29/25 10:05 AM    Specimen: Swab   Result Value Ref Range    Rapid Strep A Screen Negative     Internal Control Passed     Lot Number [id number]     Expiration Date 05/09/2027         Assessment and Plan  Diagnoses and all orders for this visit:    Anxiety and depression  - stable  - continue effexor 150mg daily, buspar 5mg TID, abilify 2mg daily, propranolol and hydroxyzine PRN.  - continue counseling    Left forearm pain  - suspect tendonitis and reassured should improve with time. Recommend avoiding activity that worsens pain.    Capillaritis  - was diagnosed by dermatology and on steroid cream PRN.     Class 3 severe obesity due to excess calories without serious comorbidity with body mass index (BMI) of 45.0 to 49.9 in adult  - she is a candidate for GLP-1  - will start efforts at high fiber, high protein diet, restricting calories to 2000 per day, and 150 minutes of exercise per week.  - follow up in 1 month      Health Maintenance  - Pap smear: 6/2022 negative. Hx of ASCUS. GYN High.  - Mammogram: Start screening at age 40.  - Colonoscopy: Start screening at age 45.  - HCV: negative  - Immunizations: COVID and flu UTD. Tdap 2016 per patient. HPV complete per patient. PCV20 UTD.  - Depression screening: negative 4/2024    Return in about 4 weeks (around 4/29/2025) for Follow up weight.

## 2025-04-02 ENCOUNTER — OFFICE VISIT (OUTPATIENT)
Dept: BEHAVIORAL HEALTH | Facility: CLINIC | Age: 25
End: 2025-04-02
Payer: COMMERCIAL

## 2025-04-02 DIAGNOSIS — F41.1 GENERALIZED ANXIETY DISORDER: Primary | ICD-10-CM

## 2025-04-02 DIAGNOSIS — F32.A DEPRESSION, UNSPECIFIED DEPRESSION TYPE: ICD-10-CM

## 2025-04-02 NOTE — PLAN OF CARE
Treatment plan updated April 2025.  Focus of treatment.  Will be as follows: Patient will exercise in increments of 15 minutes 4-5 times a week.  Patient has begun a weight loss program with PCP, patient and clinician will collaborate to assist patient in adhering to all regulations and rules of weight loss plan.  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  Treatment plan will be updated in 90 days.

## 2025-04-08 ENCOUNTER — OFFICE VISIT (OUTPATIENT)
Dept: INTERNAL MEDICINE | Facility: CLINIC | Age: 25
End: 2025-04-08
Payer: COMMERCIAL

## 2025-04-08 VITALS
OXYGEN SATURATION: 97 % | SYSTOLIC BLOOD PRESSURE: 128 MMHG | HEART RATE: 94 BPM | HEIGHT: 68 IN | DIASTOLIC BLOOD PRESSURE: 74 MMHG | BODY MASS INDEX: 44.41 KG/M2 | WEIGHT: 293 LBS

## 2025-04-08 DIAGNOSIS — N39.0 URINARY TRACT INFECTION WITH HEMATURIA, SITE UNSPECIFIED: Primary | ICD-10-CM

## 2025-04-08 DIAGNOSIS — N89.8 VAGINAL ITCHING: ICD-10-CM

## 2025-04-08 DIAGNOSIS — H69.93 DYSFUNCTION OF BOTH EUSTACHIAN TUBES: ICD-10-CM

## 2025-04-08 DIAGNOSIS — H93.8X3 PRESSURE SENSATION IN BOTH EARS: ICD-10-CM

## 2025-04-08 DIAGNOSIS — R31.9 URINARY TRACT INFECTION WITH HEMATURIA, SITE UNSPECIFIED: Primary | ICD-10-CM

## 2025-04-08 DIAGNOSIS — R35.0 URINARY FREQUENCY: ICD-10-CM

## 2025-04-08 LAB
BILIRUB BLD-MCNC: NEGATIVE MG/DL
CLARITY, POC: CLEAR
COLOR UR: YELLOW
EXPIRATION DATE: ABNORMAL
GLUCOSE UR STRIP-MCNC: NEGATIVE MG/DL
KETONES UR QL: ABNORMAL
LEUKOCYTE EST, POC: NEGATIVE
Lab: ABNORMAL
NITRITE UR-MCNC: NEGATIVE MG/ML
PH UR: 6 [PH] (ref 5–8)
PROT UR STRIP-MCNC: NEGATIVE MG/DL
RBC # UR STRIP: ABNORMAL /UL
SP GR UR: 1.03 (ref 1–1.03)
UROBILINOGEN UR QL: NORMAL

## 2025-04-08 PROCEDURE — 87591 N.GONORRHOEAE DNA AMP PROB: CPT | Performed by: NURSE PRACTITIONER

## 2025-04-08 PROCEDURE — 87798 DETECT AGENT NOS DNA AMP: CPT | Performed by: NURSE PRACTITIONER

## 2025-04-08 PROCEDURE — 87491 CHLMYD TRACH DNA AMP PROBE: CPT | Performed by: NURSE PRACTITIONER

## 2025-04-08 PROCEDURE — 87077 CULTURE AEROBIC IDENTIFY: CPT | Performed by: NURSE PRACTITIONER

## 2025-04-08 PROCEDURE — 87801 DETECT AGNT MULT DNA AMPLI: CPT | Performed by: NURSE PRACTITIONER

## 2025-04-08 PROCEDURE — 87186 SC STD MICRODIL/AGAR DIL: CPT | Performed by: NURSE PRACTITIONER

## 2025-04-08 PROCEDURE — 87661 TRICHOMONAS VAGINALIS AMPLIF: CPT | Performed by: NURSE PRACTITIONER

## 2025-04-08 PROCEDURE — 87086 URINE CULTURE/COLONY COUNT: CPT | Performed by: NURSE PRACTITIONER

## 2025-04-08 RX ORDER — CETIRIZINE HYDROCHLORIDE 10 MG/1
10 TABLET ORAL DAILY
Qty: 30 TABLET | Refills: 0 | Status: SHIPPED | OUTPATIENT
Start: 2025-04-08

## 2025-04-08 RX ORDER — FLUTICASONE PROPIONATE 50 MCG
2 SPRAY, SUSPENSION (ML) NASAL DAILY
Qty: 16 G | Refills: 1 | Status: SHIPPED | OUTPATIENT
Start: 2025-04-08

## 2025-04-08 RX ORDER — FLUCONAZOLE 150 MG/1
150 TABLET ORAL ONCE
Qty: 1 TABLET | Refills: 0 | Status: SHIPPED | OUTPATIENT
Start: 2025-04-08 | End: 2025-04-08

## 2025-04-08 NOTE — PROGRESS NOTES
Office Note     Name: Deysi Ordoñez    : 2000     MRN: [id number]     Chief Complaint  Vaginal Itching, Difficulty Urinating, and Hearing Problem    Subjective     History of Present Illness:  Deysi Ordoñez is a 24 y.o. female who presents today for an acute visit with complaints of vaginal itching, urinary frequency, and right ear pressure.     Patient was seen in  10 days ago with c/o sore throat and bilateral ear pain. She was tested for strep and was negative. She was diagnosed with bilateral AOM and treatment with Augmentin x7 days. Patient reports she completed the antibiotic regimen about 3 days ago and all ear symptoms have resolved with the exception of right sided ear fullness and muffled hearing. Denies drainage from ear. Denies other sinus/respiratory related symptoms.     Patient also presents with urinary frequency and vaginal itching for 3 days. She reports grey vaginal discharge on two occasions today and denies odor. She is also reporting a stabbing pain with urination that started today. She denies urgency. Reports urine has been pale due to increasing water intake but was considerably darker today. She denies abdominal pain and back pain. She has not tried any at home remedies for symptoms. Patient reports she has never had a vaginal yeast infection in the past. Denies hx of kidney stones. Denies current menstruation. Denies being sexually active. No baths or swimming.     UA in office is positive for 3+ blood and 1+ketones. Other readings unremarkable.    Past Medical History:   Diagnosis Date    Allergic     Anxiety     Asthma     Depression     Diarrhea 2021    Migraines        Past Surgical History:   Procedure Laterality Date    ADENOIDECTOMY  2018    NASAL POLYP SURGERY  2009, 2018    NASAL SEPTUM SURGERY  2009, 2018    NASAL SINUS SURGERY  2018    TURBINATE RESECTION  2018    WISDOM TOOTH EXTRACTION  2011       Social History     Socioeconomic  History    Marital status: Single   Tobacco Use    Smoking status: Never    Smokeless tobacco: Never   Vaping Use    Vaping status: Never Used   Substance and Sexual Activity    Alcohol use: Not Currently     Alcohol/week: 2.0 standard drinks of alcohol     Types: 2 Glasses of wine per week     Comment: once a week    Drug use: Never    Sexual activity: Never     Birth control/protection: OCP         Current Outpatient Medications:     albuterol (ACCUNEB) 1.25 MG/3ML nebulizer solution, Take 3 mL by nebulization Every 6 (Six) Hours As Needed., Disp: , Rfl:     albuterol sulfate  (90 Base) MCG/ACT inhaler, Inhale 1-2 puffs See Admin Instructions. Inhale 1-2 puffs every 4 to 6 hours as needed, Disp: , Rfl:     ARIPiprazole (Abilify) 2 MG tablet, Take 1 tablet by mouth Daily., Disp: 90 tablet, Rfl: 1    busPIRone (BUSPAR) 10 MG tablet, TAKE 1 TABLET BY MOUTH THREE TIMES A DAY, Disp: 90 tablet, Rfl: 5    cholecalciferol (VITAMIN D3) 25 MCG (1000 UT) tablet, Take 2 tablets by mouth Daily., Disp: , Rfl:     hydrOXYzine (ATARAX) 25 MG tablet, TAKE 1 TABLET BY MOUTH THREE TIMES A DAY AS NEEDED FOR ANXIETY, Disp: 90 tablet, Rfl: 0    ipratropium (ATROVENT) 0.06 % nasal spray, 1-2 SPRAYS EACH NOSTRIL 2 TIMES DAILY AS NEEDED, Disp: , Rfl:     montelukast (SINGULAIR) 10 MG tablet, , Disp: , Rfl:     naproxen (NAPROSYN) 500 MG tablet, Take 1 tablet by mouth 2 (Two) Times a Day As Needed for Headache., Disp: 30 tablet, Rfl: 5    norethindrone-ethinyl estradiol FE (Junel FE 1/20) 1-20 MG-MCG per tablet, Take 1 tablet by mouth Daily., Disp: 90 tablet, Rfl: 3    propranolol (INDERAL) 10 MG tablet, TAKE 1 TABLET BY MOUTH 2 (TWO) TIMES A DAY AS NEEDED (ANXIETY)., Disp: 60 tablet, Rfl: 0    Sodium Fluoride 5000 Sensitive 1.1-5 % gel, BRUSH WITH EVERY NIGHT. SPIT OUT DO NOT RINSE, Disp: , Rfl:     SUMAtriptan (IMITREX) 50 MG tablet, Take one tablet at onset of headache. May repeat dose one time in 2 hours if headache not  "relieved., Disp: 12 tablet, Rfl: 2    Trelegy Ellipta 100-62.5-25 MCG/ACT inhaler, Inhale 1 puff Daily., Disp: , Rfl:     venlafaxine XR (EFFEXOR-XR) 150 MG 24 hr capsule, Take 1 capsule by mouth Daily., Disp: 90 capsule, Rfl: 1    cetirizine (zyrTEC) 10 MG tablet, Take 1 tablet by mouth Daily., Disp: 30 tablet, Rfl: 0    ciprofloxacin (Cipro) 500 MG tablet, Take 1 tablet by mouth 2 (Two) Times a Day for 5 days., Disp: 10 tablet, Rfl: 0    fluticasone (FLONASE) 50 MCG/ACT nasal spray, Administer 2 sprays into the nostril(s) as directed by provider Daily., Disp: 16 g, Rfl: 1    Objective     Vital Signs  /74   Pulse 94   Ht 172.7 cm (67.99\")   Wt (!) 144 kg (317 lb 3.2 oz)   SpO2 97%   BMI 48.24 kg/m²   Estimated body mass index is 48.24 kg/m² as calculated from the following:    Height as of this encounter: 172.7 cm (67.99\").    Weight as of this encounter: 144 kg (317 lb 3.2 oz).           Physical Exam  Vitals and nursing note reviewed.   Constitutional:       General: She is not in acute distress.     Appearance: Normal appearance. She is well-developed and well-groomed. She is not ill-appearing.   HENT:      Head: Normocephalic.      Right Ear: Ear canal and external ear normal. Decreased hearing noted. A middle ear effusion is present.      Left Ear: Hearing, tympanic membrane, ear canal and external ear normal.      Nose: Nose normal.      Mouth/Throat:      Lips: Pink.      Mouth: Mucous membranes are moist. No oral lesions.      Dentition: No gum lesions.      Tongue: No lesions.      Palate: No lesions.      Pharynx: Oropharynx is clear.      Tonsils: No tonsillar exudate.   Eyes:      Conjunctiva/sclera: Conjunctivae normal.   Cardiovascular:      Rate and Rhythm: Normal rate and regular rhythm.      Heart sounds: Normal heart sounds.   Pulmonary:      Effort: Pulmonary effort is normal.      Breath sounds: Normal breath sounds.   Abdominal:      General: Bowel sounds are normal. There is no " distension.      Palpations: Abdomen is soft.      Tenderness: There is abdominal tenderness in the suprapubic area. There is no right CVA tenderness or left CVA tenderness.   Skin:     General: Skin is warm and dry.   Neurological:      General: No focal deficit present.      Mental Status: She is alert and oriented to person, place, and time.   Psychiatric:         Attention and Perception: Attention and perception normal.         Mood and Affect: Mood and affect normal.         Speech: Speech normal.         Behavior: Behavior normal. Behavior is cooperative.         Thought Content: Thought content normal.         Judgment: Judgment normal.          Assessment and Plan     Diagnoses and all orders for this visit:    1. Urinary tract infection with hematuria, site unspecified (Primary)    2. Urinary frequency  -     POCT urinalysis dipstick, automated  -     Urine Culture - Urine, Urine, Clean Catch; Future    3. Vaginal itching  -     fluconazole (DIFLUCAN) 150 MG tablet; Take 1 tablet by mouth 1 (One) Time for 1 dose.  Dispense: 1 tablet; Refill: 0  -     NuSwab VG+ - Swab, Vagina; Future    4. Dysfunction of both eustachian tubes  -     cetirizine (zyrTEC) 10 MG tablet; Take 1 tablet by mouth Daily.  Dispense: 30 tablet; Refill: 0  -     fluticasone (FLONASE) 50 MCG/ACT nasal spray; Administer 2 sprays into the nostril(s) as directed by provider Daily.  Dispense: 16 g; Refill: 1    5. Pressure sensation in both ears  -     cetirizine (zyrTEC) 10 MG tablet; Take 1 tablet by mouth Daily.  Dispense: 30 tablet; Refill: 0  -     fluticasone (FLONASE) 50 MCG/ACT nasal spray; Administer 2 sprays into the nostril(s) as directed by provider Daily.  Dispense: 16 g; Refill: 1    Plan:  Will send urine sample for culture and sensitivity to rule out infection.   Will send vaginal nuswab to rule in/out yeast infection and BV.  Start OTC zyrtec for fluid in ears.   Take Diflucan for probable yeast infection as directed.      Follow Up  Return if symptoms worsen or fail to improve.      JASS Riddle    Agree with assessment, documentation, and plan of care formulated by JASS Navarro student.    Part of this note may be an electronic transcription/translation of spoken language to printed text using the Dragon Dictation System.

## 2025-04-11 DIAGNOSIS — N39.0 URINARY TRACT INFECTION WITH HEMATURIA, SITE UNSPECIFIED: Primary | ICD-10-CM

## 2025-04-11 DIAGNOSIS — R31.9 URINARY TRACT INFECTION WITH HEMATURIA, SITE UNSPECIFIED: Primary | ICD-10-CM

## 2025-04-11 LAB
A VAGINAE DNA VAG QL NAA+PROBE: ABNORMAL SCORE
BACTERIA SPEC AEROBE CULT: ABNORMAL
BACTERIA SPEC AEROBE CULT: ABNORMAL
BVAB2 DNA VAG QL NAA+PROBE: ABNORMAL SCORE
C ALBICANS DNA VAG QL NAA+PROBE: POSITIVE
C GLABRATA DNA VAG QL NAA+PROBE: NEGATIVE
C TRACH DNA SPEC QL NAA+PROBE: NEGATIVE
MEGA1 DNA VAG QL NAA+PROBE: ABNORMAL SCORE
N GONORRHOEA DNA VAG QL NAA+PROBE: NEGATIVE
T VAGINALIS DNA VAG QL NAA+PROBE: NEGATIVE

## 2025-04-11 RX ORDER — CIPROFLOXACIN 500 MG/1
500 TABLET, FILM COATED ORAL 2 TIMES DAILY
Qty: 10 TABLET | Refills: 0 | Status: SHIPPED | OUTPATIENT
Start: 2025-04-11 | End: 2025-04-16

## 2025-04-23 ENCOUNTER — OFFICE VISIT (OUTPATIENT)
Dept: BEHAVIORAL HEALTH | Facility: CLINIC | Age: 25
End: 2025-04-23
Payer: COMMERCIAL

## 2025-04-23 DIAGNOSIS — F32.A DEPRESSION, UNSPECIFIED DEPRESSION TYPE: ICD-10-CM

## 2025-04-23 DIAGNOSIS — F41.1 GENERALIZED ANXIETY DISORDER: Primary | ICD-10-CM

## 2025-04-23 NOTE — PROGRESS NOTES
[facility] [place]                 Follow Up Adult      Follow Up Adult Note     Date:2025   Patient Name: Deysi Ordoñez  : 2000   MRN: [id number]   Time IN: 4:15 pm    Time OUT: 5:04 pm     Referring Provider: Nupur Rios MD    Chief Complaint:      ICD-10-CM ICD-9-CM   1. Generalized anxiety disorder  F41.1 300.02   2. Depression, unspecified depression type  F32.A 311        History of Present Illness:   Deysi Ordoñez is a 25 y.o. female who is being seen today for follow up counseling for GARRETT and depression.    Subjective               Patient's Support Network Includes: extended family    Functional Status: Mild impairment       Current Outpatient Medications:     albuterol (ACCUNEB) 1.25 MG/3ML nebulizer solution, Take 3 mL by nebulization Every 6 (Six) Hours As Needed., Disp: , Rfl:     albuterol sulfate  (90 Base) MCG/ACT inhaler, Inhale 1-2 puffs See Admin Instructions. Inhale 1-2 puffs every 4 to 6 hours as needed, Disp: , Rfl:     ARIPiprazole (Abilify) 2 MG tablet, Take 1 tablet by mouth Daily., Disp: 90 tablet, Rfl: 1    busPIRone (BUSPAR) 10 MG tablet, TAKE 1 TABLET BY MOUTH THREE TIMES A DAY, Disp: 90 tablet, Rfl: 5    cetirizine (zyrTEC) 10 MG tablet, Take 1 tablet by mouth Daily., Disp: 30 tablet, Rfl: 0    cholecalciferol (VITAMIN D3) 25 MCG (1000 UT) tablet, Take 2 tablets by mouth Daily., Disp: , Rfl:     fluticasone (FLONASE) 50 MCG/ACT nasal spray, Administer 2 sprays into the nostril(s) as directed by provider Daily., Disp: 16 g, Rfl: 1    hydrOXYzine (ATARAX) 25 MG tablet, TAKE 1 TABLET BY MOUTH THREE TIMES A DAY AS NEEDED FOR ANXIETY, Disp: 90 tablet, Rfl: 0    ipratropium (ATROVENT) 0.06 % nasal spray, 1-2 SPRAYS EACH NOSTRIL 2 TIMES DAILY AS NEEDED, Disp: , Rfl:     montelukast (SINGULAIR) 10 MG tablet, , Disp: , Rfl:     naproxen (NAPROSYN) 500 MG tablet, Take 1 tablet by mouth 2 (Two) Times a Day As Needed  for Headache., Disp: 30 tablet, Rfl: 5    norethindrone-ethinyl estradiol FE (Junel FE 1/20) 1-20 MG-MCG per tablet, Take 1 tablet by mouth Daily., Disp: 90 tablet, Rfl: 3    propranolol (INDERAL) 10 MG tablet, TAKE 1 TABLET BY MOUTH 2 (TWO) TIMES A DAY AS NEEDED (ANXIETY)., Disp: 60 tablet, Rfl: 0    Sodium Fluoride 5000 Sensitive 1.1-5 % gel, BRUSH WITH EVERY NIGHT. SPIT OUT DO NOT RINSE, Disp: , Rfl:     SUMAtriptan (IMITREX) 50 MG tablet, Take one tablet at onset of headache. May repeat dose one time in 2 hours if headache not relieved., Disp: 12 tablet, Rfl: 2    Trelegy Ellipta 100-62.5-25 MCG/ACT inhaler, Inhale 1 puff Daily., Disp: , Rfl:     venlafaxine XR (EFFEXOR-XR) 150 MG 24 hr capsule, Take 1 capsule by mouth Daily., Disp: 90 capsule, Rfl: 1    No Known Allergies    Objective     Physical Exam:  Vital Signs: There were no vitals filed for this visit.  There is no height or weight on file to calculate BMI.     Mental Status Exam:   Hygiene:   good  Cooperation:  Cooperative  Eye Contact:  Good  Psychomotor Behavior:  Appropriate  Affect:  Full range  Mood: normal  Speech:  Normal  Thought Process:  Goal directed  Thought Content:  Normal  Suicidal:  None  Homicidal:  None  Hallucinations:  None  Delusion:  None  Memory:  Intact  Orientation:  Person, Place, Time, and Situation  Reliability:  good  Insight:  Good  Judgement:  Good  Impulse Control:  Good  Physical/Medical Issues:   See problem list      Assessment / Plan      Diagnosis:  Diagnoses and all orders for this visit:    1. Generalized anxiety disorder (Primary)    2. Depression, unspecified depression type    Patient presented for follow-up with clinician.  Patient and clinician identified and addressed patient cognitive distortions related to low self-esteem.  Reservations about progress towards their dissertation was identified as main trigger.  Patient was responsive to intervention.  Clinician utilized active listening and reflective  response, empathy and support.    Progress toward goal: Not at goal    Prognosis: Good with ongoing treatment    PLAN:  Patient and clinician will continue to utilize a cognitive behavioral intervention which identifies and addresses patient cognitive distortions related to depression and anxiety.  Follow-ups will occur every 21 days and will last for 45 to 60 minutes in duration.    Safety: No acute safety concerns  Risk Assessment: Risk of self-harm acutely is low. Risk of self-harm chronically is also low, but could be further elevated in the event of treatment noncompliance and/or AODA.    Treatment Plan/Goals: Continue supportive psychotherapy efforts and medications as indicated. Treatment and medication options discussed during today's visit. Patient ackowledged and verbally consented to continue with current treatment plan and was educated on the importance of compliance with treatment and follow-up appointments. Patient seems reasonably able to adhere to treatment plan.      Assisted Patient in processing above session content; acknowledged and normalized patient’s thoughts, feelings, and concerns.  Rationalized patient thought process regarding anxiety and depression.      Allowed Patient to freely discuss issues  without interruption or judgement with unconditional positive regard, active listening skills, and empathy. Therapist provided a safe, confidential environment to facilitate the development of a positive therapeutic relationship and encouraged open, honest communication. Assisted Patient in identifying risk factors which would indicate the need for higher level of care including thoughts to harm self or others and/or self-harming behavior and encouraged Patient to contact this office, call 911, or present to the nearest emergency room should any of these events occur. Discussed crisis intervention services and means to access. Patient adamantly and convincingly denies current suicidal or  homicidal ideation or perceptual disturbance. Assisted Patient in processing session content; acknowledged and normalized Patient’s thoughts, feelings, and concerns by utilizing a person-centered approach in efforts to build appropriate rapport and a positive therapeutic relationship with open and honest communication. .     Part of this note may be an electronic transcription/translation of spoken language to printed text using the Dragon Dictation System.       Follow Up:   Return in about 3 weeks (around 5/14/2025).    James Escobar LCSW

## 2025-05-01 ENCOUNTER — OFFICE VISIT (OUTPATIENT)
Dept: INTERNAL MEDICINE | Facility: CLINIC | Age: 25
End: 2025-05-01
Payer: COMMERCIAL

## 2025-05-01 VITALS
SYSTOLIC BLOOD PRESSURE: 116 MMHG | HEIGHT: 68 IN | OXYGEN SATURATION: 99 % | TEMPERATURE: 98.1 F | BODY MASS INDEX: 44.41 KG/M2 | HEART RATE: 92 BPM | DIASTOLIC BLOOD PRESSURE: 78 MMHG | WEIGHT: 293 LBS

## 2025-05-01 DIAGNOSIS — F41.9 ANXIETY AND DEPRESSION: ICD-10-CM

## 2025-05-01 DIAGNOSIS — E66.01 CLASS 3 SEVERE OBESITY DUE TO EXCESS CALORIES WITHOUT SERIOUS COMORBIDITY WITH BODY MASS INDEX (BMI) OF 45.0 TO 49.9 IN ADULT: Primary | ICD-10-CM

## 2025-05-01 DIAGNOSIS — E66.813 CLASS 3 SEVERE OBESITY DUE TO EXCESS CALORIES WITHOUT SERIOUS COMORBIDITY WITH BODY MASS INDEX (BMI) OF 45.0 TO 49.9 IN ADULT: Primary | ICD-10-CM

## 2025-05-01 DIAGNOSIS — R53.83 FATIGUE, UNSPECIFIED TYPE: ICD-10-CM

## 2025-05-01 DIAGNOSIS — F32.A ANXIETY AND DEPRESSION: ICD-10-CM

## 2025-05-01 PROCEDURE — 99213 OFFICE O/P EST LOW 20 MIN: CPT | Performed by: INTERNAL MEDICINE

## 2025-05-01 RX ORDER — HYDROXYZINE HYDROCHLORIDE 25 MG/1
25 TABLET, FILM COATED ORAL 3 TIMES DAILY PRN
Qty: 90 TABLET | Refills: 1 | Status: SHIPPED | OUTPATIENT
Start: 2025-05-01

## 2025-05-01 NOTE — PROGRESS NOTES
Internal Medicine Follow Up    Chief Complaint  Deysi Ordoñez is a 25 y.o. female who presents today for follow up of chronic medical conditions outlined below.    Chief Complaint   Patient presents with    Weight Management        HPI  Ms. Ordoñez comes in today for follow up. Has lost 8lbs since last visit. Tracking calories and averaging ~2100 per day. She is exercising. Diet is high fiber, high protein. She is not aiming for a certain amount of fiber or protein and wonders if there is a goal. She has felt fatigued this last week. She reports this may be due to stress at work or her body adjusting to her current diet. She defers labs today.    Weight Management  Weight:  Decreased  Weight loss treatment:  Portion control, increasing exercise and selma-based approach  Treatment barriers:  Adherence to exercise  Physical activity tolerance:  Stable  Energy level:  Unchanged       Review of Systems  Review of Systems   Constitutional:  Positive for fatigue.   Psychiatric/Behavioral:  Positive for stress.         Current Medications  Current Outpatient Medications on File Prior to Visit   Medication Sig Dispense Refill    albuterol (ACCUNEB) 1.25 MG/3ML nebulizer solution Take 3 mL by nebulization Every 6 (Six) Hours As Needed.      albuterol sulfate  (90 Base) MCG/ACT inhaler Inhale 1-2 puffs See Admin Instructions. Inhale 1-2 puffs every 4 to 6 hours as needed      ARIPiprazole (Abilify) 2 MG tablet Take 1 tablet by mouth Daily. 90 tablet 1    busPIRone (BUSPAR) 10 MG tablet TAKE 1 TABLET BY MOUTH THREE TIMES A DAY 90 tablet 5    cetirizine (zyrTEC) 10 MG tablet Take 1 tablet by mouth Daily. 30 tablet 0    cholecalciferol (VITAMIN D3) 25 MCG (1000 UT) tablet Take 2 tablets by mouth Daily.      fluticasone (FLONASE) 50 MCG/ACT nasal spray Administer 2 sprays into the nostril(s) as directed by provider Daily. 16 g 1    hydrOXYzine (ATARAX) 25 MG tablet TAKE 1 TABLET BY MOUTH THREE TIMES A DAY AS NEEDED FOR  "ANXIETY 90 tablet 0    ipratropium (ATROVENT) 0.06 % nasal spray 1-2 SPRAYS EACH NOSTRIL 2 TIMES DAILY AS NEEDED      montelukast (SINGULAIR) 10 MG tablet       naproxen (NAPROSYN) 500 MG tablet Take 1 tablet by mouth 2 (Two) Times a Day As Needed for Headache. 30 tablet 5    norethindrone-ethinyl estradiol FE (Junel FE 1/20) 1-20 MG-MCG per tablet Take 1 tablet by mouth Daily. 90 tablet 3    propranolol (INDERAL) 10 MG tablet TAKE 1 TABLET BY MOUTH 2 (TWO) TIMES A DAY AS NEEDED (ANXIETY). 60 tablet 0    Sodium Fluoride 5000 Sensitive 1.1-5 % gel BRUSH WITH EVERY NIGHT. SPIT OUT DO NOT RINSE      SUMAtriptan (IMITREX) 50 MG tablet Take one tablet at onset of headache. May repeat dose one time in 2 hours if headache not relieved. 12 tablet 2    Trelegy Ellipta 100-62.5-25 MCG/ACT inhaler Inhale 1 puff Daily.      venlafaxine XR (EFFEXOR-XR) 150 MG 24 hr capsule Take 1 capsule by mouth Daily. 90 capsule 1     No current facility-administered medications on file prior to visit.       Allergies  No Known Allergies    Objective  Visit Vitals  /78 (BP Location: Left arm, Patient Position: Sitting)   Pulse 92   Temp 98.1 °F (36.7 °C) (Temporal)   Ht 172.7 cm (68\")   Wt (!) 142 kg (312 lb)   SpO2 99%   BMI 47.44 kg/m²        Physical Exam  Physical Exam  Vitals and nursing note reviewed.   Constitutional:       General: She is not in acute distress.     Appearance: She is well-developed. She is obese. She is not ill-appearing or toxic-appearing.   HENT:      Head: Normocephalic and atraumatic.   Eyes:      Conjunctiva/sclera: Conjunctivae normal.   Cardiovascular:      Rate and Rhythm: Normal rate and regular rhythm.      Heart sounds: Normal heart sounds.   Pulmonary:      Effort: Pulmonary effort is normal. No respiratory distress.   Skin:     General: Skin is warm and dry.   Neurological:      Mental Status: She is alert and oriented to person, place, and time. Mental status is at baseline.      Gait: Gait normal. "   Psychiatric:         Mood and Affect: Mood normal.         Behavior: Behavior normal.         Thought Content: Thought content normal.         Judgment: Judgment normal.         Results  Results for orders placed or performed in visit on 04/08/25   [facility] VG+ - Swab, Vagina    Collection Time: 04/08/25  5:13 PM    Specimen: Vagina; Swab   Result Value Ref Range    Atopobium Vaginae Low - 0 Score    BVAB 2 Low - 0 Score    Megasphaera 1 Low - 0 Score    Ning Albicans, YUSEF Positive (A) Negative    Ning Glabrata, YUSEF Negative Negative    Trichomonas vaginosis Negative Negative    Chlamydia trachomatis, YUSEF Negative Negative    Neisseria gonorrhoeae, YUSEF Negative Negative        Assessment and Plan  Diagnoses and all orders for this visit:    Class 3 severe obesity due to excess calories without serious comorbidity with body mass index (BMI) of 45.0 to 49.9 in adult  - successfully losing weight with calorie restriction, high protein and high fiber diet, exercise  - no recommended goal for protein or fiber but would prioritize these in diet in comparison to fats, carbohydrates  - candidate for GLP-1 but given progress without I recommend against starting one    Fatigue, unspecified type  - x1 week.   - defers labs today  - possibly due to work stress or new diet, weight loss  - will follow up again in 4 weeks and if fatigue persists obtain labs    Anxiety and depression  - stable  - requests refill of hydroxyzine today  - continue effexor 150mg daily, buspar 5mg TID, abilify 2mg daily, propranolol and hydroxyzine PRN.  - continue counseling         Health Maintenance  - Pap smear: 6/2022 negative. Hx of ASCUS. GYN High.  - Mammogram: Start screening at age 40.  - Colonoscopy: Start screening at age 45.  - HCV: negative  - Immunizations: COVID UTD. Tdap 2016 per patient. HPV complete per patient. PCV20 UTD.  - Depression screening: negative 4/2025     Return in about 4 weeks (around 5/29/2025) for Follow up  weight, fatigue.

## 2025-05-14 ENCOUNTER — OFFICE VISIT (OUTPATIENT)
Dept: BEHAVIORAL HEALTH | Facility: CLINIC | Age: 25
End: 2025-05-14
Payer: COMMERCIAL

## 2025-05-14 DIAGNOSIS — F32.A DEPRESSION, UNSPECIFIED DEPRESSION TYPE: ICD-10-CM

## 2025-05-14 DIAGNOSIS — F41.1 GENERALIZED ANXIETY DISORDER: Primary | ICD-10-CM

## 2025-05-16 RX ORDER — NORETHINDRONE ACETATE AND ETHINYL ESTRADIOL AND FERROUS FUMARATE 1MG-20(21)
1 KIT ORAL DAILY
Qty: 84 TABLET | Refills: 0 | Status: SHIPPED | OUTPATIENT
Start: 2025-05-16

## 2025-05-18 DIAGNOSIS — F41.9 ANXIETY AND DEPRESSION: ICD-10-CM

## 2025-05-18 DIAGNOSIS — F32.A ANXIETY AND DEPRESSION: ICD-10-CM

## 2025-05-19 RX ORDER — VENLAFAXINE HYDROCHLORIDE 150 MG/1
150 CAPSULE, EXTENDED RELEASE ORAL DAILY
Qty: 90 CAPSULE | Refills: 1 | Status: SHIPPED | OUTPATIENT
Start: 2025-05-19

## 2025-05-19 RX ORDER — ARIPIPRAZOLE 2 MG/1
2 TABLET ORAL DAILY
Qty: 90 TABLET | Refills: 1 | Status: SHIPPED | OUTPATIENT
Start: 2025-05-19

## 2025-05-30 ENCOUNTER — OFFICE VISIT (OUTPATIENT)
Dept: INTERNAL MEDICINE | Facility: CLINIC | Age: 25
End: 2025-05-30
Payer: COMMERCIAL

## 2025-05-30 VITALS
TEMPERATURE: 97.7 F | BODY MASS INDEX: 44.41 KG/M2 | WEIGHT: 293 LBS | OXYGEN SATURATION: 95 % | DIASTOLIC BLOOD PRESSURE: 80 MMHG | SYSTOLIC BLOOD PRESSURE: 118 MMHG | HEIGHT: 68 IN | HEART RATE: 78 BPM

## 2025-05-30 DIAGNOSIS — E66.813 CLASS 3 SEVERE OBESITY DUE TO EXCESS CALORIES WITHOUT SERIOUS COMORBIDITY WITH BODY MASS INDEX (BMI) OF 45.0 TO 49.9 IN ADULT: Primary | ICD-10-CM

## 2025-05-30 DIAGNOSIS — E66.01 CLASS 3 SEVERE OBESITY DUE TO EXCESS CALORIES WITHOUT SERIOUS COMORBIDITY WITH BODY MASS INDEX (BMI) OF 45.0 TO 49.9 IN ADULT: Primary | ICD-10-CM

## 2025-05-30 PROCEDURE — 99213 OFFICE O/P EST LOW 20 MIN: CPT | Performed by: INTERNAL MEDICINE

## 2025-05-30 NOTE — PROGRESS NOTES
Internal Medicine Follow Up    Chief Complaint  Deysi Ordoñez is a 25 y.o. female who presents today for follow up of chronic medical conditions outlined below.    Chief Complaint   Patient presents with    Fatigue     Sxs have improved         HPI  Ms. Ordoñez comes in today for follow up. Has lost 2lbs since last visit. Fatigue resolved after she switched effexor back to nighttime dosing. She reports that she had issues remembering to take it at night so switched to daytime. She had forgotten that it had caused fatigue in the past which is why she had been taking it at night. 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 She fears that she will not be able to focus on weight loss, diet, exercise while also working 9am-5pm and working on dissertation for 3 hours each day.    Weight Management  Weight:  Decreased  Weight loss treatment:  Bud-based approach  Treatment barriers:  Adherence to exercise and adherence to diet  Physical activity tolerance:  Improved  Energy level:  Unchanged       Review of Systems  Review of Systems   Constitutional:  Negative for activity change, appetite change, fatigue, unexpected weight gain and unexpected weight loss.   Psychiatric/Behavioral:  Positive for stress. The patient is nervous/anxious.         Current Medications  Current Outpatient Medications on File Prior to Visit   Medication Sig Dispense Refill    albuterol (ACCUNEB) 1.25 MG/3ML nebulizer solution Take 3 mL by nebulization Every 6 (Six) Hours As Needed.      albuterol sulfate  (90 Base) MCG/ACT inhaler Inhale 1-2 puffs See Admin Instructions. Inhale 1-2 puffs every 4 to 6 hours as needed      ARIPiprazole (ABILIFY) 2 MG tablet TAKE 1 TABLET BY MOUTH EVERY DAY 90 tablet 1    busPIRone (BUSPAR) 10 MG tablet TAKE 1 TABLET BY MOUTH THREE TIMES A DAY 90 tablet 5    cetirizine (zyrTEC) 10 MG tablet Take 1 tablet by mouth  "Daily. 30 tablet 0    cholecalciferol (VITAMIN D3) 25 MCG (1000 UT) tablet Take 2 tablets by mouth Daily.      fluticasone (FLONASE) 50 MCG/ACT nasal spray Administer 2 sprays into the nostril(s) as directed by provider Daily. 16 g 1    hydrOXYzine (ATARAX) 25 MG tablet Take 1 tablet by mouth 3 (Three) Times a Day As Needed for Anxiety. 90 tablet 1    ipratropium (ATROVENT) 0.06 % nasal spray 1-2 SPRAYS EACH NOSTRIL 2 TIMES DAILY AS NEEDED      montelukast (SINGULAIR) 10 MG tablet       naproxen (NAPROSYN) 500 MG tablet Take 1 tablet by mouth 2 (Two) Times a Day As Needed for Headache. 30 tablet 5    norethindrone-ethinyl estradiol FE (Junel FE 1/20) 1-20 MG-MCG per tablet TAKE 1 TABLET BY MOUTH EVERY DAY 84 tablet 0    propranolol (INDERAL) 10 MG tablet TAKE 1 TABLET BY MOUTH 2 (TWO) TIMES A DAY AS NEEDED (ANXIETY). 60 tablet 0    Sodium Fluoride 5000 Sensitive 1.1-5 % gel BRUSH WITH EVERY NIGHT. SPIT OUT DO NOT RINSE      SUMAtriptan (IMITREX) 50 MG tablet Take one tablet at onset of headache. May repeat dose one time in 2 hours if headache not relieved. 12 tablet 2    Trelegy Ellipta 100-62.5-25 MCG/ACT inhaler Inhale 1 puff Daily.      venlafaxine XR (EFFEXOR-XR) 150 MG 24 hr capsule TAKE 1 CAPSULE BY MOUTH EVERY DAY 90 capsule 1     No current facility-administered medications on file prior to visit.       Allergies  No Known Allergies    Objective  Visit Vitals  /80 (BP Location: Left arm, Patient Position: Sitting)   Pulse 78   Temp 97.7 °F (36.5 °C) (Temporal)   Ht 172.7 cm (68\")   Wt (!) 141 kg (310 lb 6.4 oz)   SpO2 95%   BMI 47.20 kg/m²        Physical Exam  Physical Exam  Vitals and nursing note reviewed.   Constitutional:       General: She is not in acute distress.     Appearance: She is well-developed. She is obese. She is not ill-appearing or toxic-appearing.   HENT:      Head: Normocephalic and atraumatic.   Eyes:      Conjunctiva/sclera: Conjunctivae normal.   Pulmonary:      Effort: " Pulmonary effort is normal. No respiratory distress.   Skin:     General: Skin is warm and dry.   Neurological:      Mental Status: She is alert and oriented to person, place, and time. Mental status is at baseline.      Gait: Gait normal.   Psychiatric:         Mood and Affect: Mood is anxious. Affect is tearful.         Behavior: Behavior normal.         Thought Content: Thought content normal.         Judgment: Judgment normal.         Results  Results for orders placed or performed in visit on 04/08/25   NuSwab VG+ - Swab, Vagina    Collection Time: 04/08/25  5:13 PM    Specimen: Vagina; Swab   Result Value Ref Range    Atopobium Vaginae Low - 0 Score    BVAB 2 Low - 0 Score    Megasphaera 1 Low - 0 Score    Ning Albicans, YUSEF Positive (A) Negative    Ning Glabrata, YUSEF Negative Negative    Trichomonas vaginosis Negative Negative    Chlamydia trachomatis, YUSEF Negative Negative    Neisseria gonorrhoeae, YUSEF Negative Negative        Assessment and Plan  Diagnoses and all orders for this visit:    Class 3 severe obesity due to excess calories without serious comorbidity with body mass index (BMI) of 45.0 to 49.9 in adult  - successfully losing weight with calorie restriction, high protein and high fiber diet, exercise  - she is concerned that she will not be able to put forth as much effort on diet, exercise while 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  - candidate for GLP-1 but not until she has completed 6 months of diet program. Discussed self pay cost but unable to afford.  - recommend she alter goal temporarily to just maintain weight and can refocus on weight loss after dissertation complete      Health Maintenance  - Pap smear: 6/2022 negative. Hx of ASCUS. GYN High.  - Mammogram: Start screening at age 38.  - Colonoscopy: Start screening at age 43.  - HCV: negative  - Immunizations: COVID UTD. Tdap 2016 per patient. HPV complete per patient. PCV20 UTD.  - Depression screening: negative 4/2025    Return in about  4 weeks (around 6/27/2025) for Follow up weight, mood.

## 2025-06-04 ENCOUNTER — OFFICE VISIT (OUTPATIENT)
Dept: BEHAVIORAL HEALTH | Facility: CLINIC | Age: 25
End: 2025-06-04
Payer: COMMERCIAL

## 2025-06-04 DIAGNOSIS — F41.1 GENERALIZED ANXIETY DISORDER: Primary | ICD-10-CM

## 2025-06-04 DIAGNOSIS — F32.A DEPRESSION, UNSPECIFIED DEPRESSION TYPE: ICD-10-CM

## 2025-06-04 NOTE — PROGRESS NOTES
[facility] [place]                 Follow Up Adult      Follow Up Adult Note     Date:2025   Patient Name: Deysi Ordoñez  : 2000   MRN: [id number]   Time IN: 8:55 am    Time OUT: 9:35 am     Referring Provider: Nupur Rios MD    Chief Complaint:      ICD-10-CM ICD-9-CM   1. Generalized anxiety disorder  F41.1 300.02   2. Depression, unspecified depression type  F32.A 311        History of Present Illness:   Deysi Ordoñez is a 25 y.o. female who is being seen today for follow up counseling for GARRETT and depression.    Subjective               Patient's Support Network Includes: extended family    Functional Status: Mild impairment       Current Outpatient Medications:     albuterol (ACCUNEB) 1.25 MG/3ML nebulizer solution, Take 3 mL by nebulization Every 6 (Six) Hours As Needed., Disp: , Rfl:     albuterol sulfate  (90 Base) MCG/ACT inhaler, Inhale 1-2 puffs See Admin Instructions. Inhale 1-2 puffs every 4 to 6 hours as needed, Disp: , Rfl:     ARIPiprazole (ABILIFY) 2 MG tablet, TAKE 1 TABLET BY MOUTH EVERY DAY, Disp: 90 tablet, Rfl: 1    busPIRone (BUSPAR) 10 MG tablet, TAKE 1 TABLET BY MOUTH THREE TIMES A DAY, Disp: 90 tablet, Rfl: 5    cetirizine (zyrTEC) 10 MG tablet, Take 1 tablet by mouth Daily., Disp: 30 tablet, Rfl: 0    cholecalciferol (VITAMIN D3) 25 MCG (1000 UT) tablet, Take 2 tablets by mouth Daily., Disp: , Rfl:     fluticasone (FLONASE) 50 MCG/ACT nasal spray, Administer 2 sprays into the nostril(s) as directed by provider Daily., Disp: 16 g, Rfl: 1    hydrOXYzine (ATARAX) 25 MG tablet, Take 1 tablet by mouth 3 (Three) Times a Day As Needed for Anxiety., Disp: 90 tablet, Rfl: 1    ipratropium (ATROVENT) 0.06 % nasal spray, 1-2 SPRAYS EACH NOSTRIL 2 TIMES DAILY AS NEEDED, Disp: , Rfl:     montelukast (SINGULAIR) 10 MG tablet, , Disp: , Rfl:     naproxen (NAPROSYN) 500 MG tablet, Take 1 tablet by mouth 2 (Two) Times a Day As  Needed for Headache., Disp: 30 tablet, Rfl: 5    norethindrone-ethinyl estradiol FE (Junel FE 1/20) 1-20 MG-MCG per tablet, TAKE 1 TABLET BY MOUTH EVERY DAY, Disp: 84 tablet, Rfl: 0    propranolol (INDERAL) 10 MG tablet, TAKE 1 TABLET BY MOUTH 2 (TWO) TIMES A DAY AS NEEDED (ANXIETY)., Disp: 60 tablet, Rfl: 0    Sodium Fluoride 5000 Sensitive 1.1-5 % gel, BRUSH WITH EVERY NIGHT. SPIT OUT DO NOT RINSE, Disp: , Rfl:     SUMAtriptan (IMITREX) 50 MG tablet, Take one tablet at onset of headache. May repeat dose one time in 2 hours if headache not relieved., Disp: 12 tablet, Rfl: 2    Trelegy Ellipta 100-62.5-25 MCG/ACT inhaler, Inhale 1 puff Daily., Disp: , Rfl:     venlafaxine XR (EFFEXOR-XR) 150 MG 24 hr capsule, TAKE 1 CAPSULE BY MOUTH EVERY DAY, Disp: 90 capsule, Rfl: 1    No Known Allergies    Objective     Physical Exam:  Vital Signs: There were no vitals filed for this visit.  There is no height or weight on file to calculate BMI.     Mental Status Exam:   Hygiene:   good  Cooperation:  Cooperative  Eye Contact:  Good  Psychomotor Behavior:  Appropriate  Affect:  Full range  Mood: normal  Speech:  Normal  Thought Process:  Goal directed  Thought Content:  Normal  Suicidal:  None  Homicidal:  None  Hallucinations:  None  Delusion:  None  Memory:  Intact  Orientation:  Person, Place, Time, and Situation  Reliability:  good  Insight:  Good  Judgement:  Good  Impulse Control:  Good  Physical/Medical Issues:  See problem list     Assessment / Plan      Diagnosis:  Diagnoses and all orders for this visit:    1. Generalized anxiety disorder (Primary)    2. Depression, unspecified depression type    Patient presented for follow-up with clinician.  Patient and clinician identified and addressed patient cognitive distortions related to anxiety.  Patient expressed frustration with the perceived lack of communication from their superior regarding their graduation date.  Patient stated that they had been preparing for  graduation in December as instructed but have been told by their superior that they may be allowed to graduate in May should they desire.  Superior that they may be allowed to graduate in May.  Patient and clinician discussed pros and cons of patient adhering to new or old academic schedule.  Clinician utilized active listening and reflective response to convey empathy and support.    Progress toward goal: Not at goal    Prognosis: Good with ongoing treatment    PLAN:  Patient and clinician will continue to utilize a cognitive behavioral intervention which identifies and addresses patient cognitive distortions related to low self-esteem.  Follow-ups will occur every 21 days and will last for 45 to 60 minutes in duration.    Safety: No acute safety concerns  Risk Assessment: Risk of self-harm acutely is low. Risk of self-harm chronically is also low, but could be further elevated in the event of treatment noncompliance and/or AODA.    Treatment Plan/Goals: Continue supportive psychotherapy efforts and medications as indicated. Treatment and medication options discussed during today's visit. Patient ackowledged and verbally consented to continue with current treatment plan and was educated on the importance of compliance with treatment and follow-up appointments. Patient seems reasonably able to adhere to treatment plan.      Assisted Patient in processing above session content; acknowledged and normalized patient’s thoughts, feelings, and concerns.  Rationalized patient thought process regarding GARRETT and depression.      Allowed Patient to freely discuss issues  without interruption or judgement with unconditional positive regard, active listening skills, and empathy. Therapist provided a safe, confidential environment to facilitate the development of a positive therapeutic relationship and encouraged open, honest communication. Assisted Patient in identifying risk factors which would indicate the need for higher  level of care including thoughts to harm self or others and/or self-harming behavior and encouraged Patient to contact this office, call 911, or present to the nearest emergency room should any of these events occur. Discussed crisis intervention services and means to access. Patient adamantly and convincingly denies current suicidal or homicidal ideation or perceptual disturbance. Assisted Patient in processing session content; acknowledged and normalized Patient’s thoughts, feelings, and concerns by utilizing a person-centered approach in efforts to build appropriate rapport and a positive therapeutic relationship with open and honest communication. .     Part of this note may be an electronic transcription/translation of spoken language to printed text using the Dragon Dictation System.       Follow Up:   Return in about 3 weeks (around 6/25/2025) for Next scheduled follow up.    James Escobar LCSW

## 2025-06-05 DIAGNOSIS — H93.8X3 PRESSURE SENSATION IN BOTH EARS: ICD-10-CM

## 2025-06-05 DIAGNOSIS — H69.93 DYSFUNCTION OF BOTH EUSTACHIAN TUBES: ICD-10-CM

## 2025-06-05 RX ORDER — FLUTICASONE PROPIONATE 50 MCG
2 SPRAY, SUSPENSION (ML) NASAL DAILY
Refills: 1 | OUTPATIENT
Start: 2025-06-05

## 2025-06-17 ENCOUNTER — OFFICE VISIT (OUTPATIENT)
Age: 25
End: 2025-06-17
Payer: COMMERCIAL

## 2025-06-17 VITALS
HEIGHT: 68 IN | DIASTOLIC BLOOD PRESSURE: 85 MMHG | WEIGHT: 293 LBS | SYSTOLIC BLOOD PRESSURE: 137 MMHG | BODY MASS INDEX: 44.41 KG/M2

## 2025-06-17 DIAGNOSIS — N92.1 BREAKTHROUGH BLEEDING ON OCPS: ICD-10-CM

## 2025-06-17 DIAGNOSIS — Z01.419 WOMEN'S ANNUAL ROUTINE GYNECOLOGICAL EXAMINATION: Primary | ICD-10-CM

## 2025-06-17 DIAGNOSIS — Z80.3 FAMILY HISTORY OF BREAST CANCER: ICD-10-CM

## 2025-06-17 RX ORDER — NORETHINDRONE ACETATE AND ETHINYL ESTRADIOL 1MG-20(21)
1 KIT ORAL DAILY
Qty: 84 TABLET | Refills: 4 | Status: SHIPPED | OUTPATIENT
Start: 2025-06-17

## 2025-06-17 NOTE — PROGRESS NOTES
Subjective   Chief Complaint   Patient presents with    Annual Exam     Having some slight breakthrough bleeding on continuous birth control     Deysi Ordoñez is a 25 y.o. year old  presenting to be seen for her annual exam. She is doing well, but reports some spotting that started on . She is taking her birth control continuously. This is the first time this has ever happened. She does report recent stress.     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     She also reports a history of breast cancer on both maternal and paternal aunts with breast cancer. Maternal aunt was diagnosed in her 60s, and paternal was quite a while ago. She is interested in genetic counseling to see if she qualifies for early/additional screening.   Negative genetic testing in  (Sema4).     SEXUAL Hx:  She is not currently sexually active.  In the past year there there has been NO new sexual partners.    Condoms are never used.  She would not like to be screened for STD's at today's exam.  Current birth control method: OCP (estrogen/progesterone). (Not sexually active and would be with female only partners in the future should she ever become active)  She is happy with her current method of contraception and does not want to discuss alternative methods of contraception.  MENSTRUAL Hx:  Patient's last menstrual period was 06/15/2025.  In the past 6 months her cycles have been absent.    Intermenstrual bleeding is present - see HPI.    Post-coital bleeding is absent.  Dysmenorrhea: none  PMS: none  Her cycles are not a source of concern for her that she wishes to discuss today.  HEALTH Hx:  She exercises regularly: yes. She has been getting 30 min of exercise 3-4x a week!   She has concerns about domestic violence: no.      The following portions of the patient's history were reviewed and updated as appropriate:problem list, current medications, allergies, past family  "history, past medical history, past social history, and past surgical history.    Social History    Tobacco Use      Smoking status: Never      Smokeless tobacco: Never         Objective   /85 (BP Location: Right arm, Patient Position: Sitting, Cuff Size: Large Adult)   Ht 172.7 cm (68\")   Wt (!) 140 kg (308 lb)   LMP 06/15/2025 Comment: SPOTTING  BMI 46.83 kg/m²     General:  well developed; well nourished  no acute distress  mentation appropriate  obese - Body mass index is 46.83 kg/m².   Breasts:  Examined in supine position  Symmetric without masses or skin dimpling  Nipples normal without inversion, lesions or discharge  There are no palpable axillary nodes  Fibrocystic changes are present both breasts without a discrete mass   Pelvis: Exam limited by  body habitus  Clinical staff was present for exam  External genitalia:  normal appearance of the external genitalia including Bartholin's and Hoehne's glands.  :  urethral meatus normal; urethra normal:  Vaginal:  normal pink mucosa without prolapse or lesions. blood present -  moderate amount;  Cervix:  normal appearance.  Uterus:  not palpable.  Adnexa:  non palpable bilaterally.  Rectal:  digital rectal exam not performed; anus visually normal appearing.        Assessment   Annual GYN exam  Contraception management  Breakthrough bleeding   Family history of breast cancer (negative genetic screening 2022)      Plan   Pap was done today.  If she does not receive the results of the Pap within 2 weeks  time, she was instructed to call to find out the results.  I explained to Deysi that the recommendations for Pap smear interval in a low risk patient's has lengthened to 3 years time.  I encouraged her to be seen yearly for a full physical exam including breast and pelvic exam even during the off years when PAP's will not be performed.   Continue OCPs. Discussed breakthrough spotting can occur sporadically with continuous use, especially under " significant stress.  Discussed if bleeding does not resolve or becomes a recurrent issue, recommend switching to an alternative form of contraception.  Patient voiced understanding.  Discussed referral for genetic counseling and patient agreeable. Patient to call her insurance to ensure coverage before proceeding.   The importance of keeping all planned follow-up and taking all medications as prescribed was emphasized.  Follow up for annual exam in 1 year or sooner PRN      New Medications Ordered This Visit   Medications    norethindrone-ethinyl estradiol FE (Junel FE 1/20) 1-20 MG-MCG per tablet     Sig: Take 1 tablet by mouth Daily.     Dispense:  84 tablet     Refill:  4          This note was electronically signed.    Velia Persaud MD   June 17, 2025

## 2025-06-18 LAB — REF LAB TEST METHOD: NORMAL

## 2025-06-25 ENCOUNTER — OFFICE VISIT (OUTPATIENT)
Dept: BEHAVIORAL HEALTH | Facility: CLINIC | Age: 25
End: 2025-06-25
Payer: COMMERCIAL

## 2025-06-25 DIAGNOSIS — F32.A DEPRESSION, UNSPECIFIED DEPRESSION TYPE: ICD-10-CM

## 2025-06-25 DIAGNOSIS — F41.1 GENERALIZED ANXIETY DISORDER: Primary | ICD-10-CM

## 2025-06-25 NOTE — TREATMENT PLAN
Multi-Disciplinary Problems (from Behavioral Health Treatment Plan)      Active Problems       Problem: Anxiety  Start Date: 06/25/25      Problem Details: The patient self-scales this problem as a 6 with 10 being the worst.          Goal Priority Start Date Expected End Date End Date    Patient will develop and implement behavioral and cognitive strategies to reduce anxiety and irrational fears. -- 06/25/25 12/24/25 --    Goal Details: Progress toward goal:  The patient self-scales their progress related to this goal as a 5 with 10 being the worst.        Goal Intervention Frequency Start Date End Date    Help patient explore past emotional issues in relation to present anxiety. PRN 06/25/25 --    Intervention Details: Duration of treatment until discharged.        Goal Intervention Frequency Start Date End Date    Help patient develop an awareness of their cognitive and physical responses to anxiety. PRN 06/25/25 --    Intervention Details: Duration of treatment until discharged.                Problem: Depression  Start Date: 06/25/25      Problem Details: The patient self-scales this problem as a 5 with 10 being the worst.          Goal Priority Start Date Expected End Date End Date    Patient will demonstrate the ability to initiate new constructive life skills outside of sessions on a consistent basis. -- 06/25/25 12/24/25 --    Goal Details: Progress toward goal:  The patient self-scales their progress related to this goal as a 5 with 10 being the worst.        Goal Intervention Frequency Start Date End Date    Assist patient in setting attainable activities of daily living goals. PRN 06/25/25 --    Intervention Details: See care plan note        Goal Intervention Frequency Start Date End Date    Provide education about depression PRN 06/25/25 --    Intervention Details: Duration of treatment until discharged.        Goal Intervention Frequency Start Date End Date    Assist patient in developing healthy  coping strategies. PRN 06/25/25 --    Intervention Details: Duration of treatment until discharged.                        Reviewed By       James Escobar LCSW 06/25/25 [id number]                     I have discussed and reviewed this treatment plan with the patient.

## 2025-06-25 NOTE — PROGRESS NOTES
[facility] [place]                 Follow Up Adult      Follow Up Adult Note     Date:2025   Patient Name: eDysi Ordoñez  : 2000   MRN: [id number]   Time IN: 4:32 pm    Time OUT: 5:19 PM     Referring Provider: Nupur Rios MD    Chief Complaint:      ICD-10-CM ICD-9-CM   1. Generalized anxiety disorder  F41.1 300.02   2. Depression, unspecified depression type  F32.A 311        History of Present Illness:   Deysi Ordoñez is a 25 y.o. female who is being seen today for follow up counseling for GARRETT and depression.    Subjective               Patient's Support Network Includes: extended family    Functional Status: Mild impairment       Current Outpatient Medications:     albuterol (ACCUNEB) 1.25 MG/3ML nebulizer solution, Take 3 mL by nebulization Every 6 (Six) Hours As Needed., Disp: , Rfl:     albuterol sulfate  (90 Base) MCG/ACT inhaler, Inhale 1-2 puffs See Admin Instructions. Inhale 1-2 puffs every 4 to 6 hours as needed, Disp: , Rfl:     ARIPiprazole (ABILIFY) 2 MG tablet, TAKE 1 TABLET BY MOUTH EVERY DAY, Disp: 90 tablet, Rfl: 1    busPIRone (BUSPAR) 10 MG tablet, TAKE 1 TABLET BY MOUTH THREE TIMES A DAY, Disp: 90 tablet, Rfl: 5    cetirizine (zyrTEC) 10 MG tablet, Take 1 tablet by mouth Daily., Disp: 30 tablet, Rfl: 0    cholecalciferol (VITAMIN D3) 25 MCG (1000 UT) tablet, Take 2 tablets by mouth Daily., Disp: , Rfl:     fluticasone (FLONASE) 50 MCG/ACT nasal spray, Administer 2 sprays into the nostril(s) as directed by provider Daily., Disp: 16 g, Rfl: 1    hydrOXYzine (ATARAX) 25 MG tablet, Take 1 tablet by mouth 3 (Three) Times a Day As Needed for Anxiety., Disp: 90 tablet, Rfl: 1    ipratropium (ATROVENT) 0.06 % nasal spray, 1-2 SPRAYS EACH NOSTRIL 2 TIMES DAILY AS NEEDED, Disp: , Rfl:     montelukast (SINGULAIR) 10 MG tablet, , Disp: , Rfl:     naproxen (NAPROSYN) 500 MG tablet, Take 1 tablet by mouth 2 (Two) Times a Day As  Needed for Headache., Disp: 30 tablet, Rfl: 5    norethindrone-ethinyl estradiol FE (Junel FE 1/20) 1-20 MG-MCG per tablet, Take 1 tablet by mouth Daily., Disp: 84 tablet, Rfl: 4    propranolol (INDERAL) 10 MG tablet, TAKE 1 TABLET BY MOUTH 2 (TWO) TIMES A DAY AS NEEDED (ANXIETY)., Disp: 60 tablet, Rfl: 0    Sodium Fluoride 5000 Sensitive 1.1-5 % gel, BRUSH WITH EVERY NIGHT. SPIT OUT DO NOT RINSE, Disp: , Rfl:     SUMAtriptan (IMITREX) 50 MG tablet, Take one tablet at onset of headache. May repeat dose one time in 2 hours if headache not relieved., Disp: 12 tablet, Rfl: 2    Trelegy Ellipta 100-62.5-25 MCG/ACT inhaler, Inhale 1 puff Daily., Disp: , Rfl:     venlafaxine XR (EFFEXOR-XR) 150 MG 24 hr capsule, TAKE 1 CAPSULE BY MOUTH EVERY DAY, Disp: 90 capsule, Rfl: 1    No Known Allergies    Objective     Physical Exam:  Vital Signs: There were no vitals filed for this visit.  There is no height or weight on file to calculate BMI.     Mental Status Exam:   Hygiene:   good  Cooperation:  Cooperative  Eye Contact:  Good  Psychomotor Behavior:  Appropriate  Affect:  Full range  Mood: normal  Speech:  Normal  Thought Process:  Goal directed  Thought Content:  Normal  Suicidal:  None  Homicidal:  None  Hallucinations:  None  Delusion:  None  Memory:  Intact  Orientation:  Person, Place, Time, and Situation  Reliability:  good  Insight:  Good  Judgement:  Good  Impulse Control:  Good  Physical/Medical Issues:  See problem list     Assessment / Plan      Diagnosis:  Diagnoses and all orders for this visit:    1. Generalized anxiety disorder (Primary)    2. Depression, unspecified depression type    Treatment plan Hyun June 25, 2025.  Patient reports progress in the areas of daily functioning both professionally and socially.  Patient reports instances of irritation and frustration impulsive behavior.  The treatment.  For the next 90 days will concentrate on the following: Patient will attend all appointments as scheduled,  patient will take all medication as prescribed, patient and clinician will identify and address cognitive distortions inside session.  Treatment plan will be updated in 90 days.    Progress toward goal: Treatment plan Hyun June 25, 2025.  Patient reports progress in the areas of daily functioning both professionally and socially.  Patient reports instances of irritation and frustration impulsive behavior.  The treatment.  For the next 90 days will concentrate on the following: Patient will attend all appointments as scheduled, patient will take all medication as prescribed, patient and clinician will identify and address cognitive distortions inside session.  Treatment plan will be updated in 90 days.    Prognosis: Good with ongoing treatment    PLAN:  Treatment plan Hyun June 25, 2025.  Patient reports progress in the areas of daily functioning both professionally and socially.  Patient reports instances of irritation and frustration impulsive behavior.  The treatment.  For the next 90 days will concentrate on the following: Patient will attend all appointments as scheduled, patient will take all medication as prescribed, patient and clinician will identify and address cognitive distortions inside session.  Treatment plan will be updated in 90 days.     Safety: No acute safety concerns  Risk Assessment: Risk of self-harm acutely is low. Risk of self-harm chronically is also low, but could be further elevated in the event of treatment noncompliance and/or AODA.    Treatment Plan/Goals: Continue supportive psychotherapy efforts and medications as indicated. Treatment and medication options discussed during today's visit. Patient ackowledged and verbally consented to continue with current treatment plan and was educated on the importance of compliance with treatment and follow-up appointments. Patient seems reasonably able to adhere to treatment plan.      Assisted Patient in processing above session content;  acknowledged and normalized patient’s thoughts, feelings, and concerns.  Rationalized patient thought process regarding anxiety and depression.      Allowed Patient to freely discuss issues  without interruption or judgement with unconditional positive regard, active listening skills, and empathy. Therapist provided a safe, confidential environment to facilitate the development of a positive therapeutic relationship and encouraged open, honest communication. Assisted Patient in identifying risk factors which would indicate the need for higher level of care including thoughts to harm self or others and/or self-harming behavior and encouraged Patient to contact this office, call 911, or present to the nearest emergency room should any of these events occur. Discussed crisis intervention services and means to access. Patient adamantly and convincingly denies current suicidal or homicidal ideation or perceptual disturbance. Assisted Patient in processing session content; acknowledged and normalized Patient’s thoughts, feelings, and concerns by utilizing a person-centered approach in efforts to build appropriate rapport and a positive therapeutic relationship with open and honest communication. .     Part of this note may be an electronic transcription/translation of spoken language to printed text using the Dragon Dictation System.       Follow Up:   Return in about 3 weeks (around 7/16/2025) for Next scheduled follow up.    James Escobar LCSW

## 2025-06-25 NOTE — PLAN OF CARE
Treatment plan Hyun June 25, 2025.  Patient reports progress in the areas of daily functioning both professionally and socially.  Patient reports instances of irritation and frustration impulsive behavior.  The treatment.  For the next 90 days will concentrate on the following: Patient will attend all appointments as scheduled, patient will take all medication as prescribed, patient and clinician will identify and address cognitive distortions inside session.  Treatment plan will be updated in 90 days.

## 2025-06-29 DIAGNOSIS — F32.A ANXIETY AND DEPRESSION: ICD-10-CM

## 2025-06-29 DIAGNOSIS — F41.9 ANXIETY AND DEPRESSION: ICD-10-CM

## 2025-06-30 RX ORDER — HYDROXYZINE HYDROCHLORIDE 25 MG/1
25 TABLET, FILM COATED ORAL 3 TIMES DAILY PRN
Qty: 90 TABLET | Refills: 1 | Status: SHIPPED | OUTPATIENT
Start: 2025-06-30

## 2025-07-09 ENCOUNTER — OFFICE VISIT (OUTPATIENT)
Dept: INTERNAL MEDICINE | Facility: CLINIC | Age: 25
End: 2025-07-09
Payer: COMMERCIAL

## 2025-07-09 VITALS
DIASTOLIC BLOOD PRESSURE: 76 MMHG | TEMPERATURE: 98.3 F | HEART RATE: 80 BPM | HEIGHT: 68 IN | OXYGEN SATURATION: 98 % | BODY MASS INDEX: 44.41 KG/M2 | SYSTOLIC BLOOD PRESSURE: 116 MMHG | WEIGHT: 293 LBS

## 2025-07-09 DIAGNOSIS — F32.A ANXIETY AND DEPRESSION: ICD-10-CM

## 2025-07-09 DIAGNOSIS — F41.9 ANXIETY AND DEPRESSION: ICD-10-CM

## 2025-07-09 DIAGNOSIS — E66.813 CLASS 3 SEVERE OBESITY DUE TO EXCESS CALORIES WITHOUT SERIOUS COMORBIDITY WITH BODY MASS INDEX (BMI) OF 45.0 TO 49.9 IN ADULT: Primary | ICD-10-CM

## 2025-07-09 PROCEDURE — 99213 OFFICE O/P EST LOW 20 MIN: CPT | Performed by: INTERNAL MEDICINE

## 2025-07-09 NOTE — PROGRESS NOTES
Internal Medicine Follow Up    Chief Complaint  Deysi Ordoñez is a 25 y.o. female who presents today for follow up of chronic medical conditions outlined below.    Chief Complaint   Patient presents with    Weight Management        HPI  Ms. Ordoñez comes in today for follow up. Has lost 2lbs since last visit even though she has been on vacation and not as focused on diet, exercise. 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 She overall feels well today.    Weight Management  Weight:  Decreased  Weight change (lbs):  2  Weight loss treatment:  Portion control, increasing exercise and selma-based approach  Treatment barriers:  Adherence to exercise and adherence to diet  Physical activity tolerance:  Improved  Energy level:  Unchanged       Review of Systems  Review of Systems   Constitutional: Negative.    Psychiatric/Behavioral:  Negative for depressed mood and stress. The patient is not nervous/anxious.         Current Medications  Current Outpatient Medications on File Prior to Visit   Medication Sig Dispense Refill    albuterol (ACCUNEB) 1.25 MG/3ML nebulizer solution Take 3 mL by nebulization Every 6 (Six) Hours As Needed.      albuterol sulfate  (90 Base) MCG/ACT inhaler Inhale 1-2 puffs See Admin Instructions. Inhale 1-2 puffs every 4 to 6 hours as needed      ARIPiprazole (ABILIFY) 2 MG tablet TAKE 1 TABLET BY MOUTH EVERY DAY 90 tablet 1    busPIRone (BUSPAR) 10 MG tablet TAKE 1 TABLET BY MOUTH THREE TIMES A DAY 90 tablet 5    cetirizine (zyrTEC) 10 MG tablet Take 1 tablet by mouth Daily. 30 tablet 0    cholecalciferol (VITAMIN D3) 25 MCG (1000 UT) tablet Take 2 tablets by mouth Daily.      fluticasone (FLONASE) 50 MCG/ACT nasal spray Administer 2 sprays into the nostril(s) as directed by provider Daily. 16 g 1    hydrOXYzine (ATARAX) 25 MG tablet TAKE 1 TABLET BY MOUTH 3 TIMES A DAY AS NEEDED FOR ANXIETY. 90  "tablet 1    ipratropium (ATROVENT) 0.06 % nasal spray 1-2 SPRAYS EACH NOSTRIL 2 TIMES DAILY AS NEEDED      montelukast (SINGULAIR) 10 MG tablet       naproxen (NAPROSYN) 500 MG tablet Take 1 tablet by mouth 2 (Two) Times a Day As Needed for Headache. 30 tablet 5    norethindrone-ethinyl estradiol FE (Junel FE 1/20) 1-20 MG-MCG per tablet Take 1 tablet by mouth Daily. 84 tablet 4    propranolol (INDERAL) 10 MG tablet TAKE 1 TABLET BY MOUTH 2 (TWO) TIMES A DAY AS NEEDED (ANXIETY). 60 tablet 0    Sodium Fluoride 5000 Sensitive 1.1-5 % gel BRUSH WITH EVERY NIGHT. SPIT OUT DO NOT RINSE      SUMAtriptan (IMITREX) 50 MG tablet Take one tablet at onset of headache. May repeat dose one time in 2 hours if headache not relieved. 12 tablet 2    Trelegy Ellipta 100-62.5-25 MCG/ACT inhaler Inhale 1 puff Daily.      venlafaxine XR (EFFEXOR-XR) 150 MG 24 hr capsule TAKE 1 CAPSULE BY MOUTH EVERY DAY 90 capsule 1     No current facility-administered medications on file prior to visit.       Allergies  No Known Allergies    Objective  Visit Vitals  /76 (BP Location: Left arm, Patient Position: Sitting)   Pulse 80   Temp 98.3 °F (36.8 °C) (Temporal)   Ht 172.7 cm (68\")   Wt (!) 140 kg (308 lb 3.2 oz)   LMP 06/15/2025 Comment: SPOTTING   SpO2 98%   BMI 46.86 kg/m²        Physical Exam  Physical Exam  Vitals and nursing note reviewed.   Constitutional:       General: She is not in acute distress.     Appearance: She is well-developed. She is obese. She is not ill-appearing or toxic-appearing.   HENT:      Head: Normocephalic and atraumatic.   Eyes:      Conjunctiva/sclera: Conjunctivae normal.   Pulmonary:      Effort: Pulmonary effort is normal. No respiratory distress.   Skin:     General: Skin is warm and dry.   Neurological:      Mental Status: She is alert and oriented to person, place, and time. Mental status is at baseline.      Gait: Gait normal.   Psychiatric:         Mood and Affect: Affect normal.         Behavior: " Behavior normal.         Thought Content: Thought content normal.         Judgment: Judgment normal.         Results  Results for orders placed or performed in visit on 25   LIQUID-BASED PAP SMEAR WITH HPV GENOTYPING IF ASCUS ([facility]AD)    Collection Time: 25  3:31 PM    Specimen: Cervix, Endocervix; ThinPrep Vial   Result Value Ref Range    Reference Lab Report       [facility]  [address] [place] [address]  Phone: [phone] or [phone]  Fax: [phone]  Brian Reveles M.D., Medical Director    PATIENT NAME                                     LABORATORY NO.  [id number]   YESENIA REYNA                              [id number]  [id number]                                 AGE                    SEX   SSN              CLIENT REF #  [facility] RENAY VICK                  25        2000      F     [id number]      [id number]    [address]            REQUESTING M.D.           ATTENDING [place] [address]                        RENAY VICK  DATE COLLECTED            DATE RECEIVED          DATE REPORTED  2025    ThinPrep Pap with [employer] [facility]    DIAGNOSIS:  Negative for intraepithelial lesion or malignancy    Multiple factors can influence accuracy of Pap tests; therefore, screening  at  regular intervals is necessary for early cancer detection.      SPECIMEN ADEQUACY:  SATISFACTORY FOR EVALUATION  Transformation zone is present.  Sparse cellularity, minimal number of squamous cells available for evaluation.  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    Anxiety and depression  - stable  - continue effexor 150mg daily, buspar 5mg TID, abilify 2mg daily, propranolol and hydroxyzine PRN.  - continue counseling     Health Maintenance  - Pap smear: 6/2025 negative. Hx of ASCUS. GYN High.  - Mammogram: Start screening at age 40.  - Colonoscopy: Start screening at age 45.  - HCV: negative  - Immunizations: COVID UTD. Tdap 2016 per patient. HPV complete per patient. PCV20 UTD.  - Depression screening: negative 4/2025    Return for Next scheduled follow up.

## 2025-07-16 ENCOUNTER — OFFICE VISIT (OUTPATIENT)
Dept: BEHAVIORAL HEALTH | Facility: CLINIC | Age: 25
End: 2025-07-16
Payer: COMMERCIAL

## 2025-07-16 DIAGNOSIS — F41.1 GENERALIZED ANXIETY DISORDER: Primary | ICD-10-CM

## 2025-07-16 DIAGNOSIS — F32.A DEPRESSION, UNSPECIFIED DEPRESSION TYPE: ICD-10-CM

## 2025-07-16 NOTE — PROGRESS NOTES
[facility] [place]                 Follow Up Adult      Follow Up Adult Note     Date:2025   Patient Name: Deysi Ordoñez  : 2000   MRN: [id number]   Time IN: 9:00 am    Time OUT: 9:32 am     Referring Provider: Nupur Rios MD    Chief Complaint:      ICD-10-CM ICD-9-CM   1. Generalized anxiety disorder  F41.1 300.02   2. Depression, unspecified depression type  F32.A 311        History of Present Illness:   Deysi Ordoñez is a 25 y.o. female who is being seen today for follow up counseling for GARRETT and depression.    Subjective               Patient's Support Network Includes: extended family    Functional Status: Mild impairment       Current Outpatient Medications:     albuterol (ACCUNEB) 1.25 MG/3ML nebulizer solution, Take 3 mL by nebulization Every 6 (Six) Hours As Needed., Disp: , Rfl:     albuterol sulfate  (90 Base) MCG/ACT inhaler, Inhale 1-2 puffs See Admin Instructions. Inhale 1-2 puffs every 4 to 6 hours as needed, Disp: , Rfl:     ARIPiprazole (ABILIFY) 2 MG tablet, TAKE 1 TABLET BY MOUTH EVERY DAY, Disp: 90 tablet, Rfl: 1    busPIRone (BUSPAR) 10 MG tablet, TAKE 1 TABLET BY MOUTH THREE TIMES A DAY, Disp: 90 tablet, Rfl: 5    cholecalciferol (VITAMIN D3) 25 MCG (1000 UT) tablet, Take 2 tablets by mouth Daily., Disp: , Rfl:     hydrOXYzine (ATARAX) 25 MG tablet, TAKE 1 TABLET BY MOUTH 3 TIMES A DAY AS NEEDED FOR ANXIETY., Disp: 90 tablet, Rfl: 1    ipratropium (ATROVENT) 0.06 % nasal spray, Administer 1 spray into the nostril(s) as directed by provider Daily As Needed for Rhinitis., Disp: , Rfl:     montelukast (SINGULAIR) 10 MG tablet, , Disp: , Rfl:     naproxen (NAPROSYN) 500 MG tablet, Take 1 tablet by mouth 2 (Two) Times a Day As Needed for Headache., Disp: 30 tablet, Rfl: 5    norethindrone-ethinyl estradiol FE (Junel FE ) 1-20 MG-MCG per tablet, Take 1 tablet by mouth Daily., Disp: 84 tablet, Rfl: 4    propranolol  (INDERAL) 10 MG tablet, TAKE 1 TABLET BY MOUTH 2 (TWO) TIMES A DAY AS NEEDED (ANXIETY)., Disp: 60 tablet, Rfl: 0    Sodium Fluoride 5000 Sensitive 1.1-5 % gel, BRUSH WITH EVERY NIGHT. SPIT OUT DO NOT RINSE, Disp: , Rfl:     SUMAtriptan (IMITREX) 50 MG tablet, Take one tablet at onset of headache. May repeat dose one time in 2 hours if headache not relieved., Disp: 12 tablet, Rfl: 2    Trelegy Ellipta 100-62.5-25 MCG/ACT inhaler, Inhale 1 puff Daily., Disp: , Rfl:     venlafaxine XR (EFFEXOR-XR) 150 MG 24 hr capsule, TAKE 1 CAPSULE BY MOUTH EVERY DAY, Disp: 90 capsule, Rfl: 1    No Known Allergies    Objective     Physical Exam:  Vital Signs: There were no vitals filed for this visit.  There is no height or weight on file to calculate BMI.     Mental Status Exam:   Hygiene:   good  Cooperation:  Cooperative  Eye Contact:  Good  Psychomotor Behavior:  Appropriate  Affect:  Full range  Mood: normal  Speech:  Normal  Thought Process:  Goal directed  Thought Content:  Normal  Suicidal:  None  Homicidal:  None  Hallucinations:  None  Delusion:  None  Memory:  Intact  Orientation:  Person, Place, Time, and Situation  Reliability:  good  Insight:  Good  Judgement:  Good  Impulse Control:  Good  Physical/Medical Issues:  See problem list     Assessment / Plan      Diagnosis:  Diagnoses and all orders for this visit:    1. Generalized anxiety disorder (Primary)    2. Depression, unspecified depression type    Patient presented for follow-up with clinician.  Patient and clinician identified and addressed patient cognitive distortions related to anxiety.  Stress regarding the patient's academic program were identified as main trigger.  Patient was responsive to intervention.  Clinician utilized active listening and reflective response to convey empathy and support.    Progress toward goal: Not at goal    Prognosis: Good with ongoing treatment    PLAN:  Patient and clinician will continue to utilize a cognitive behavioral  intervention which identifies and addresses patient cognitive distortions related to anxiety and depression.  Follow-ups will occur every 21 days and will last for 45 to 60 minutes in duration.    Safety: No acute safety concerns  Risk Assessment: Risk of self-harm acutely is low. Risk of self-harm chronically is also low, but could be further elevated in the event of treatment noncompliance and/or AODA.    Treatment Plan/Goals: Continue supportive psychotherapy efforts and medications as indicated. Treatment and medication options discussed during today's visit. Patient ackowledged and verbally consented to continue with current treatment plan and was educated on the importance of compliance with treatment and follow-up appointments. Patient seems reasonably able to adhere to treatment plan.      Assisted Patient in processing above session content; acknowledged and normalized patient’s thoughts, feelings, and concerns.  Rationalized patient thought process regarding GARRETT and depression.      Allowed Patient to freely discuss issues  without interruption or judgement with unconditional positive regard, active listening skills, and empathy. Therapist provided a safe, confidential environment to facilitate the development of a positive therapeutic relationship and encouraged open, honest communication. Assisted Patient in identifying risk factors which would indicate the need for higher level of care including thoughts to harm self or others and/or self-harming behavior and encouraged Patient to contact this office, call 911, or present to the nearest emergency room should any of these events occur. Discussed crisis intervention services and means to access. Patient adamantly and convincingly denies current suicidal or homicidal ideation or perceptual disturbance. Assisted Patient in processing session content; acknowledged and normalized Patient’s thoughts, feelings, and concerns by utilizing a person-centered  approach in efforts to build appropriate rapport and a positive therapeutic relationship with open and honest communication. .     Part of this note may be an electronic transcription/translation of spoken language to printed text using the Dragon Dictation System.       Follow Up:   Return in about 3 weeks (around 8/6/2025) for Next scheduled follow up.    James Escobar, ANYW

## 2025-08-05 ENCOUNTER — OFFICE VISIT (OUTPATIENT)
Dept: BEHAVIORAL HEALTH | Facility: CLINIC | Age: 25
End: 2025-08-05
Payer: COMMERCIAL

## 2025-08-05 DIAGNOSIS — F41.1 GENERALIZED ANXIETY DISORDER: Primary | ICD-10-CM

## 2025-08-05 DIAGNOSIS — F32.A DEPRESSION, UNSPECIFIED DEPRESSION TYPE: ICD-10-CM

## 2025-08-22 DIAGNOSIS — F41.9 ANXIETY AND DEPRESSION: ICD-10-CM

## 2025-08-22 DIAGNOSIS — F32.A ANXIETY AND DEPRESSION: ICD-10-CM

## 2025-08-22 RX ORDER — BUSPIRONE HYDROCHLORIDE 10 MG/1
10 TABLET ORAL 3 TIMES DAILY
Qty: 90 TABLET | Refills: 0 | Status: SHIPPED | OUTPATIENT
Start: 2025-08-22

## 2025-08-22 RX ORDER — HYDROXYZINE HYDROCHLORIDE 25 MG/1
25 TABLET, FILM COATED ORAL 3 TIMES DAILY PRN
Qty: 90 TABLET | Refills: 0 | Status: SHIPPED | OUTPATIENT
Start: 2025-08-22

## 2025-08-26 ENCOUNTER — OFFICE VISIT (OUTPATIENT)
Dept: BEHAVIORAL HEALTH | Facility: CLINIC | Age: 25
End: 2025-08-26
Payer: COMMERCIAL

## 2025-08-26 DIAGNOSIS — F41.1 GENERALIZED ANXIETY DISORDER: Primary | ICD-10-CM

## 2025-08-26 DIAGNOSIS — F32.A DEPRESSION, UNSPECIFIED DEPRESSION TYPE: ICD-10-CM
